# Patient Record
Sex: FEMALE | Race: BLACK OR AFRICAN AMERICAN | Employment: OTHER | URBAN - METROPOLITAN AREA
[De-identification: names, ages, dates, MRNs, and addresses within clinical notes are randomized per-mention and may not be internally consistent; named-entity substitution may affect disease eponyms.]

---

## 2017-01-14 DIAGNOSIS — Z00.00 ROUTINE GENERAL MEDICAL EXAMINATION AT A HEALTH CARE FACILITY: ICD-10-CM

## 2017-01-15 RX ORDER — PHENTERMINE AND TOPIRAMATE 11.25; 69 MG/1; MG/1
CAPSULE, EXTENDED RELEASE ORAL
Qty: 30 CAP | Refills: 2 | OUTPATIENT
Start: 2017-01-15 | End: 2017-08-15 | Stop reason: SDUPTHER

## 2017-01-25 ENCOUNTER — HOSPITAL ENCOUNTER (EMERGENCY)
Age: 57
Discharge: HOME | End: 2017-01-25
Attending: EMERGENCY MEDICINE
Payer: COMMERCIAL

## 2017-01-25 VITALS
HEIGHT: 64 IN | TEMPERATURE: 97.9 F | BODY MASS INDEX: 27.1 KG/M2 | WEIGHT: 158.73 LBS | SYSTOLIC BLOOD PRESSURE: 122 MMHG | HEART RATE: 83 BPM | DIASTOLIC BLOOD PRESSURE: 68 MMHG | OXYGEN SATURATION: 97 % | RESPIRATION RATE: 18 BRPM

## 2017-01-25 DIAGNOSIS — T78.40XA ALLERGIC REACTION, INITIAL ENCOUNTER: Primary | ICD-10-CM

## 2017-01-25 DIAGNOSIS — R22.0 SWELLING OF BOTH LIPS: ICD-10-CM

## 2017-01-25 PROCEDURE — 74011250636 HC RX REV CODE- 250/636: Performed by: EMERGENCY MEDICINE

## 2017-01-25 PROCEDURE — 99284 EMERGENCY DEPT VISIT MOD MDM: CPT

## 2017-01-25 PROCEDURE — 96361 HYDRATE IV INFUSION ADD-ON: CPT

## 2017-01-25 PROCEDURE — 96375 TX/PRO/DX INJ NEW DRUG ADDON: CPT

## 2017-01-25 PROCEDURE — 74011000250 HC RX REV CODE- 250: Performed by: EMERGENCY MEDICINE

## 2017-01-25 PROCEDURE — 96374 THER/PROPH/DIAG INJ IV PUSH: CPT

## 2017-01-25 RX ORDER — FAMOTIDINE 10 MG/ML
20 INJECTION INTRAVENOUS
Status: COMPLETED | OUTPATIENT
Start: 2017-01-25 | End: 2017-01-25

## 2017-01-25 RX ORDER — EPINEPHRINE 0.3 MG/.3ML
0.3 INJECTION SUBCUTANEOUS
Qty: 1 SYRINGE | Refills: 1 | Status: SHIPPED | OUTPATIENT
Start: 2017-01-25 | End: 2017-10-24 | Stop reason: ALTCHOICE

## 2017-01-25 RX ORDER — FAMOTIDINE 20 MG/1
20 TABLET, FILM COATED ORAL 2 TIMES DAILY
Qty: 20 TAB | Refills: 0 | Status: SHIPPED | OUTPATIENT
Start: 2017-01-25 | End: 2017-01-25

## 2017-01-25 RX ORDER — DIPHENHYDRAMINE HYDROCHLORIDE 50 MG/ML
25 INJECTION, SOLUTION INTRAMUSCULAR; INTRAVENOUS
Status: COMPLETED | OUTPATIENT
Start: 2017-01-25 | End: 2017-01-25

## 2017-01-25 RX ORDER — PREDNISONE 10 MG/1
TABLET ORAL
Qty: 21 TAB | Refills: 0 | Status: SHIPPED | OUTPATIENT
Start: 2017-01-25 | End: 2017-01-25

## 2017-01-25 RX ORDER — DIPHENHYDRAMINE HCL 25 MG
25 CAPSULE ORAL
Qty: 20 CAP | Refills: 0 | Status: SHIPPED | OUTPATIENT
Start: 2017-01-25 | End: 2017-01-25

## 2017-01-25 RX ORDER — DIPHENHYDRAMINE HCL 25 MG
25 CAPSULE ORAL
Qty: 20 CAP | Refills: 0 | Status: SHIPPED | OUTPATIENT
Start: 2017-01-25 | End: 2017-02-04

## 2017-01-25 RX ORDER — PREDNISONE 10 MG/1
TABLET ORAL
Qty: 21 TAB | Refills: 0 | Status: SHIPPED | OUTPATIENT
Start: 2017-01-25 | End: 2017-03-01 | Stop reason: ALTCHOICE

## 2017-01-25 RX ORDER — EPINEPHRINE 0.3 MG/.3ML
0.3 INJECTION SUBCUTANEOUS
Qty: 1 SYRINGE | Refills: 1 | Status: SHIPPED | OUTPATIENT
Start: 2017-01-25 | End: 2017-01-25

## 2017-01-25 RX ORDER — FAMOTIDINE 20 MG/1
20 TABLET, FILM COATED ORAL 2 TIMES DAILY
Qty: 20 TAB | Refills: 0 | Status: SHIPPED | OUTPATIENT
Start: 2017-01-25 | End: 2017-02-04

## 2017-01-25 RX ADMIN — FAMOTIDINE 20 MG: 10 INJECTION, SOLUTION INTRAVENOUS at 06:10

## 2017-01-25 RX ADMIN — SODIUM CHLORIDE 1000 ML: 900 INJECTION, SOLUTION INTRAVENOUS at 06:10

## 2017-01-25 RX ADMIN — DIPHENHYDRAMINE HYDROCHLORIDE 25 MG: 50 INJECTION, SOLUTION INTRAMUSCULAR; INTRAVENOUS at 06:10

## 2017-01-25 RX ADMIN — METHYLPREDNISOLONE SODIUM SUCCINATE 125 MG: 125 INJECTION, POWDER, FOR SOLUTION INTRAMUSCULAR; INTRAVENOUS at 06:10

## 2017-01-25 NOTE — ED TRIAGE NOTES
Assumed care of patient; patient resting comfortably on stretcher; stretcher in lowest position and locked; patient in no apparent distress at this time; call bell within reach. Patient ambulatory to room with noticeable swelling to lips; states no other symptoms involved; patient rates pain in lip is 5/10.

## 2017-01-25 NOTE — ED PROVIDER NOTES
HPI Comments: Funmilayo Oreilly is a 49 y.o. female with PMHx significant for arthritis, who presents ambulatory to [facility] ED with cc of lip swelling, which she noticed this morning when she woke up. Pt states that she woke up this morning with swelling in her top lip, and associated 5/10 discomfort in the lip due to tightness of the skin. She denies taking any medications prior to arrival to the ED, and since arrival she also feels like her bottom lip has become more swollen. Pt reports that she does not take prescription medications, and reports that she has not any changes in make-up or toothpaste. The only thing that she can think of that was different, was that she had some \"home made soup\" last night for dinner, which her boyfriend brought home from a co-worker. Of note, pt reports that she had a similar reaction in the past to PCN, but she denies using any PCN products. Pt also denies any known dietary food allergies, and she specifically denies any itching, tongue swelling, SOB, difficulty swallowing, drooling, CP, abdominal pain, nausea, vomiting, and rashes. PCP: Paula Olivera MD    PMHx is significant for: arthritis   PSHx is significant for: B/L knee replacement surgery, right shoulder surgery, hysterectomy, colonoscopy   Social History: (-) Tobacco, (+) EtOH (occasionally), (-) Illicit Drugs      There are no other complaints, changes, or physical findings at this time. Written by RONNELL Gallardoibjanuary, as dictated by Hermila Schroeder MD.      The history is provided by the patient.         Past Medical History:   Diagnosis Date    Arthritis        Past Surgical History:   Procedure Laterality Date    Hx orthopaedic  12/2009     bilateral knee replacement    Hx mohs procedure  2003     right shoulder    Hx hysterectomy  2003    Hx colonoscopy  1/15/15     Dr. Nohemi Villegas - 8 yr f/u         Family History:   Problem Relation Age of Onset    Family history unknown: Yes       Social History     Social History    Marital status:      Spouse name: N/A    Number of children: N/A    Years of education: N/A     Occupational History    Not on file. Social History Main Topics    Smoking status: Never Smoker    Smokeless tobacco: Never Used    Alcohol use 1.8 - 2.4 oz/week     3 - 4 Cans of beer per week      Comment: occassionally    Drug use: No    Sexual activity: Yes     Partners: Male     Birth control/ protection: Surgical     Other Topics Concern    Not on file     Social History Narrative         ALLERGIES: Demerol [meperidine] and Penicillin g    Review of Systems   Constitutional: Negative. HENT: Positive for facial swelling (Upper>lower lip). Negative for drooling, trouble swallowing and voice change. Negative for itching of lip  Negative for tongue swelling   Eyes: Negative. Respiratory: Negative. Negative for shortness of breath. Cardiovascular: Negative. Negative for chest pain. Gastrointestinal: Negative. Negative for abdominal pain, nausea and vomiting. Genitourinary: Negative. Musculoskeletal: Negative. Skin: Negative. Negative for rash. Neurological: Negative. Hematological: Negative. Psychiatric/Behavioral: Negative. All other systems reviewed and are negative.       Patient Vitals for the past 12 hrs:   Temp Pulse Resp BP SpO2   01/25/17 0730 - - - 122/68 97 %   01/25/17 0715 - - - 123/69 97 %   01/25/17 0700 - - - 133/71 98 %   01/25/17 0645 - - - 127/62 98 %   01/25/17 0630 - - - 127/66 100 %   01/25/17 0615 - - - 115/59 100 %   01/25/17 0600 - - - 116/73 100 %   01/25/17 0548 - - - 113/66 -   01/25/17 0546 97.9 °F (36.6 °C) 83 18 113/66 100 %          Physical Exam     General appearance - well nourished, well appearing, and in no distress  Eyes - pupils equal and reactive, extraocular eye movements intact  ENT - mucous membranes moist, pharynx normal without lesions, swelling to upper and lower lips (upper > lower), no tongue swelling, normal voice, handling secretions  Neck - supple, no significant adenopathy; non-tender to palpation  Chest - clear to auscultation, no wheezes, rales or rhonchi; non-tender to palpation  Heart - normal rate and regular rhythm, S1 and S2 normal, no murmurs noted  Abdomen - soft, nontender, nondistended, no masses or organomegaly  Musculoskeletal - no joint tenderness, deformity or swelling; normal ROM  Extremities - peripheral pulses normal, no pedal edema  Skin - normal coloration and turgor, no rashes  Neurological - alert, oriented x3, normal speech, no focal findings or movement disorder noted         MDM  Number of Diagnoses or Management Options  Diagnosis management comments: DDx: angioedema, allergic reaction, cellulitis       Amount and/or Complexity of Data Reviewed  Review and summarize past medical records: yes    Patient Progress  Patient progress: stable    ED Course       Procedures    Progress Note:  7:34 AM  Pt re-evaluated. She reports that her swelling has improved, and notes that she is feeling better. Will plan for discharge. Noticed that \"QSYMIA\" is listed as part of patient's medications. However, pt states that she has not yet started taking that medication, and the only thing she is taking is detox tea. Written by Dhaval Wilkinson ED Scribe, as dictated by Janet Og MD.      MEDICATIONS GIVEN:  Medications   sodium chloride 0.9 % bolus infusion 1,000 mL (0 mL IntraVENous IV Completed 1/25/17 0738)   methylPREDNISolone (PF) (SOLU-MEDROL) injection 125 mg (125 mg IntraVENous Given 1/25/17 0610)   diphenhydrAMINE (BENADRYL) injection 25 mg (25 mg IntraVENous Given 1/25/17 0610)   famotidine (PF) (PEPCID) injection 20 mg (20 mg IntraVENous Given 1/25/17 0610)       IMPRESSION:  1. Allergic reaction, initial encounter    2. Swelling of both lips        PLAN:  1.    Current Discharge Medication List      START taking these medications    Details   predniSONE (STERAPRED DS) 10 mg dose pack As directed  Qty: 21 Tab, Refills: 0      famotidine (PEPCID) 20 mg tablet Take 1 Tab by mouth two (2) times a day for 10 days. Qty: 20 Tab, Refills: 0      diphenhydrAMINE (BENADRYL) 25 mg capsule Take 1 Cap by mouth every six (6) hours as needed for up to 10 days. Qty: 20 Cap, Refills: 0      EPINEPHrine (EPIPEN) 0.3 mg/0.3 mL injection 0.3 mL by IntraMUSCular route once as needed for up to 1 dose. Qty: 1 Syringe, Refills: 1         CONTINUE these medications which have NOT CHANGED    Details   QSYMIA 11.25-69 mg CM24 TAKE 1 CAPSULE BY MOUTH EVERY MORNING  Qty: 30 Cap, Refills: 2    Associated Diagnoses: Routine general medical examination at a health care facility      RX AMB NIFEDIPINE 0.2 % RECTAL OINTMENT COMPOUND Nifedipine 0.2% Rectal Ointment (zinc 25mg/L-Carnitine 10mg)    Apply to anus three times daily x 7 days  Qty: 60 g, Refills: 1    Associated Diagnoses: Thrombosed external hemorrhoid      ibuprofen (MOTRIN) 800 mg tablet every six (6) hours as needed. 2.   Follow-up Information     Follow up With Details [id number] Lulu Abraham III, MD In 2 days  [address] [facility]  [phone]      [facility] EMERGENCY DEPT  If symptoms worsen [address] [place] [address]  [phone]        3. Return to ED if worse     Discharge Note:  7:39 AM   The patient has been re-evaluated and is ready for discharge. Reviewed available results with patient. Counseled patient on diagnosis and care plan. Patient has expressed understanding, and all questions have been answered. Patient agrees with plan and agrees to follow up as recommended, or to return to the ED if their symptoms worsen. Discharge instructions have been provided and explained to the patient, along with reasons to return to the ED. This note is prepared by Garo Santa, acting as Scribe for Janet Patino MD.     Hermila Schroeder MD: The ana's documentation has been prepared under my direction and personally reviewed by me in its entirety. I confirm that the note above accurately reflects all work, treatment, procedures, and medical decision making performed by me.

## 2017-01-25 NOTE — ED NOTES
Change of shift report given to oncoming nurse; from off-going nurse; report included SBAR, MAR and ED summary.

## 2017-01-25 NOTE — ED NOTES
The patient was given discharge instructions by MD, as well as printed prescriptions, and questions were answered. Patient is in no apparent distress at time of discharge. Ambulatory with steady gait. Friend picking her up from the waiting room.

## 2017-01-25 NOTE — ED NOTES
Hourly rounding complete; patient resting comfortably on stretcher in lowest position and locked and in NAD; patient A&Ox4; monitor x 2; call bell within reach.

## 2017-01-25 NOTE — DISCHARGE INSTRUCTIONS
Allergic Reaction: Care Instructions  Your Care Instructions  An allergic reaction is an excessive response from your immune system to a medicine, chemical, food, insect bite, or other substance. A reaction can range from mild to life-threatening. Some people have a mild rash, hives, and itching or stomach cramps. In severe reactions, swelling of your tongue and throat can close up your airway so that you cannot breathe. Follow-up care is a key part of your treatment and safety. Be sure to make and go to all appointments, and call your doctor if you are having problems. It's also a good idea to know your test results and keep a list of the medicines you take. How can you care for yourself at home? · If you know what caused your allergic reaction, be sure to avoid it. Your allergy may become more severe each time you have a reaction. · Take an over-the-counter antihistamine, such as cetirizine (Zyrtec) or loratadine (Claritin), to treat mild symptoms. Read and follow directions on the label. Some antihistamines can make you feel sleepy. Do not give antihistamines to a child unless you have checked with your doctor first. Mild symptoms include sneezing or an itchy or runny nose; an itchy mouth; a few hives or mild itching; and mild nausea or stomach discomfort. · Do not scratch hives or a rash. Put a cold, moist towel on them or take cool baths to relieve itching. Put ice packs on hives, swelling, or insect stings for 10 to 15 minutes at a time. Put a thin cloth between the ice pack and your skin. Do not take hot baths or showers. They will make the itching worse. · Your doctor may prescribe a shot of epinephrine to carry with you in case you have a severe reaction. Learn how to give yourself the shot and keep it with you at all times. Make sure it is not . · Go to the emergency room every time you have a severe reaction, even if you have used your shot of epinephrine and are feeling better. Symptoms can come back after a shot. · Wear medical alert jewelry that lists your allergies. You can buy this at most drugstores. · If your child has a severe allergy, make sure that his or her teachers, babysitters, coaches, and other caregivers know about the allergy. They should have an epinephrine shot, know how and when to give it, and have a plan to take your child to the hospital.  When should you call for help? Give an epinephrine shot if:  · You think you are having a severe allergic reaction. · You have symptoms in more than one body area, such as mild nausea and an itchy mouth. After giving an epinephrine shot call 911, even if you feel better. Call 911 if:  · You have symptoms of a severe allergic reaction. These may include:  ¨ Sudden raised, red areas (hives) all over your body. ¨ Swelling of the throat, mouth, lips, or tongue. ¨ Trouble breathing. ¨ Passing out (losing consciousness). Or you may feel very lightheaded or suddenly feel weak, confused, or restless. · You have been given an epinephrine shot, even if you feel better. Call your doctor now or seek immediate medical care if:  · You have symptoms of an allergic reaction, such as:  ¨ A rash or hives (raised, red areas on the skin). ¨ Itching. ¨ Swelling. ¨ Belly pain, nausea, or vomiting. Watch closely for changes in your health, and be sure to contact your doctor if:  · You do not get better as expected. Where can you learn more? Go to http://ginger-leon.info/. Enter F569 in the search box to learn more about \"Allergic Reaction: Care Instructions. \"  Current as of: February 12, 2016  Content Version: 11.1  © 7765-3300 [employer]. Care instructions adapted under license by [employer] (which disclaims liability or warranty for this information).  If you have questions about a medical condition or this instruction, always ask your healthcare professional. Marion Jacinto disclaims any warranty or liability for your use of this information.

## 2017-01-25 NOTE — LETTER
[facility] EM[facility] 
[address] [place] [address] [id number] [phone] Work/School Note Date: 1/25/2017 To Whom It May concern: 
 
Braxton Sandy was seen and treated today in the emergency room by the following provider(s): 
Attending Provider: Jody Hernandez MD.   
 
Braxton Sandy should be excused from work on 1/25/2017. Sincerely, Jody Hernandez MD

## 2017-03-01 ENCOUNTER — OFFICE VISIT (OUTPATIENT)
Dept: INTERNAL MEDICINE CLINIC | Age: 57
End: 2017-03-01

## 2017-03-01 VITALS
HEART RATE: 80 BPM | SYSTOLIC BLOOD PRESSURE: 110 MMHG | TEMPERATURE: 97.7 F | OXYGEN SATURATION: 97 % | DIASTOLIC BLOOD PRESSURE: 80 MMHG | RESPIRATION RATE: 19 BRPM | BODY MASS INDEX: 25.54 KG/M2 | WEIGHT: 149.6 LBS | HEIGHT: 64 IN

## 2017-03-01 DIAGNOSIS — R10.13 EPIGASTRIC PAIN: Primary | ICD-10-CM

## 2017-03-01 RX ORDER — OMEPRAZOLE 20 MG/1
20 CAPSULE, DELAYED RELEASE ORAL DAILY
Qty: 30 CAP | Refills: 1 | Status: SHIPPED | OUTPATIENT
Start: 2017-03-01 | End: 2017-10-24 | Stop reason: ALTCHOICE

## 2017-03-01 NOTE — PROGRESS NOTES
Acute Care Note    FRANCISCO NIELSEN is 49 y.o. female. she presents for evaluation of Epigastric Pain    Abdominal Pain  Arlette Hameed is here to talk about abdominal pain. This is a new problem and symptoms began 1 months ago and have not changed. Pain is located in epigastric and described as a burning. She symptoms are aggravated by none. Symptoms improve with none. She has tried no specific therapy for this. Past history includes no specific GI history. Previous studies include colonoscopy which showed only hemorrhoids. Of note, she has recently resumed taking Qsymia for weight loss (she takes this off and on). She reports that her abdominal pain began shortly after starting the medication. Prior to Admission medications    Medication Sig Start Date End Date Taking? Authorizing Provider   QSYMIA 11.25-69 mg CM24 TAKE 1 CAPSULE BY MOUTH EVERY MORNING 1/15/17  Yes Kerri Chen III, MD   ibuprofen (MOTRIN) 800 mg tablet every six (6) hours as needed. 12/16/14  Yes Historical Provider   EPINEPHrine (EPIPEN) 0.3 mg/0.3 mL injection 0.3 mL by IntraMUSCular route once as needed for up to 1 dose. 1/25/17 April N MD Gale   RX AMB NIFEDIPINE 0.2 % RECTAL OINTMENT COMPOUND Nifedipine 0.2% Rectal Ointment (zinc 25mg/L-Carnitine 10mg)    Apply to anus three times daily x 7 days  Patient taking differently: as needed. Nifedipine 0.2% Rectal Ointment (zinc 25mg/L-Carnitine 10mg)    Apply to anus three times daily x 7 days 9/15/15   Raquel Dixon MD         Patient Active Problem List   Diagnosis Code    Advance directive declined by patient Z78.9         Review of Systems   Constitutional: Negative. Cardiovascular: Negative. Gastrointestinal: Positive for abdominal pain and heartburn.          Visit Vitals    /80 (BP 1 Location: Right arm, BP Patient Position: Sitting)    Pulse 80    Temp 97.7 °F (36.5 °C) (Oral)    Resp 19    Ht 5' 4\" (1.626 m)    Wt 149 lb 9.6 oz (67.9 kg)    SpO2 97%    BMI 25.68 kg/m2       Physical Exam   Constitutional: She appears well-developed and well-nourished. Cardiovascular: Normal rate and regular rhythm. Pulmonary/Chest: Effort normal and breath sounds normal. She has no wheezes. Abdominal: Soft. Bowel sounds are normal. There is tenderness (mild epigastric tenderness). ASSESSMENT/PLAN  Ene Baez was seen today for epigastric pain. Diagnoses and all orders for this visit:    Epigastric pain- Her symptoms seem to be precipitated by the medication. She will hold this for now and treat her dyspepsia symptoms with prilosec. -     omeprazole (PRILOSEC) 20 mg capsule; Take 1 Cap by mouth daily. Advised the patient to call back or return to office if symptoms worsen/change/persist.   Discussed expected course/resolution/complications of diagnosis in detail with patient. Medication risks/benefits/costs/interactions/alternatives discussed with patient. The patient was given an after visit summary which includes diagnoses, current medications, & vitals. They expressed understanding with the diagnosis and plan.

## 2017-03-01 NOTE — MR AVS SNAPSHOT
Visit Information Date & Time Provider Department Dept. Phone Encounter #  
 3/1/2017  3:30 PM [facility] [phone] [id number] Upcoming Health Maintenance Date Due INFLUENZA AGE 9 TO ADULT 8/1/2016 BREAST CANCER SCRN MAMMOGRAM 1/6/2018 PAP AKA CERVICAL CYTOLOGY 2/1/2018 COLONOSCOPY 4/1/2025 DTaP/Tdap/Td series (2 - Td) 7/31/2025 Allergies as of 3/1/2017  Review Complete On: 3/1/2017 By: Mahin Chacko MD  
  
 Severity Noted Reaction Type Reactions Demerol [Meperidine]  05/28/2010    Itching Penicillin G  05/28/2010    Anaphylaxis Current Immunizations  Reviewed on 6/10/2016 Name Date Influenza Vaccine 10/19/2015 Tdap 7/31/2015 Not reviewed this visit You Were Diagnosed With   
  
 Codes Comments Epigastric pain    -  Primary ICD-10-CM: R10.13 ICD-9-CM: 789.06 Vitals BP  
  
  
  
  
  
 110/80 (BP 1 Location: Right arm, BP Patient Position: Sitting) BMI and BSA Data Body Mass Index Body Surface Area  
 25.68 kg/m 2 1.75 m 2 Preferred Pharmacy Pharmacy Name Phone [facility] [address] [phone] Your Updated Medication List  
  
   
This list is accurate as of: 3/1/17  4:07 PM.  Always use your most recent med list.  
  
  
  
  
 EPINEPHrine 0.3 mg/0.3 mL injection Commonly known as:  EPIPEN  
0.3 mL by IntraMUSCular route once as needed for up to 1 dose. ibuprofen 800 mg tablet Commonly known as:  MOTRIN  
every six (6) hours as needed. omeprazole 20 mg capsule Commonly known as:  PRILOSEC Take 1 Cap by mouth daily. QSYMIA 11.25-69 mg Cm24 Generic drug:  phentermine-topiramate TAKE 1 CAPSULE BY MOUTH EVERY MORNING  
  
 RX AMB NIFEDIPINE 0.2 % RECTAL OINTMENT COMPOUND Nifedipine 0.2% Rectal Ointment (zinc 25mg/L-Carnitine 10mg)  Apply to anus three times daily x 7 days Prescriptions Sent to Pharmacy Refills  
 omeprazole (PRILOSEC) 20 mg capsule 1 Sig: Take 1 Cap by mouth daily. Class: Normal  
 Pharmacy: [facility] [address] [place] [address] #: [phone] Route: Oral  
  
Patient Instructions Indigestion (Dyspepsia or Heartburn): Care Instructions Your Care Instructions Sometimes it can be hard to pinpoint the cause of indigestion (dyspepsia or heartburn). Most cases of an upset stomach with bloating, burning, burping, and nausea are minor and go away within several hours. Home treatment and over-the-counter medicine often are able to control symptoms. But if you take medicine to relieve your indigestion without making diet and lifestyle changes, your symptoms are likely to return again and again. If you get indigestion often, it may be a sign of a more serious medical problem. Be sure to follow up with your doctor, who may want to do tests to be sure of the cause of your indigestion. Follow-up care is a key part of your treatment and safety. Be sure to make and go to all appointments, and call your doctor if you are having problems. Its also a good idea to know your test results and keep a list of the medicines you take. How can you care for yourself at home? · Your doctor may recommend over-the-counter medicine. For mild or occasional indigestion, antacids such as Tums, Gaviscon, Mylanta, or Maalox may help. Your doctor also may recommend over-the-counter acid reducers, such as Pepcid AC, Tagamet HB, Zantac 75, or Prilosec. Read and follow all instructions on the label. If you use these medicines often, talk with your doctor. · Change your eating habits. ¨ Its best to eat several small meals instead of two or three large meals. ¨ After you eat, wait 2 to 3 hours before you lie down. ¨ Chocolate, mint, and alcohol can make GERD worse. ¨ Spicy foods, foods that have a lot of acid (like tomatoes and oranges), and coffee can make GERD symptoms worse in some people. If your symptoms are worse after you eat a certain food, you may want to stop eating that food to see if your symptoms get better. · Do not smoke or chew tobacco. Smoking can make GERD worse. If you need help quitting, talk to your doctor about stop-smoking programs and medicines. These can increase your chances of quitting for good. · If you have GERD symptoms at night, raise the head of your bed 6 to 8 inches by putting the frame on blocks or placing a foam wedge under the head of your mattress. (Adding extra pillows does not work.) · Do not wear tight clothing around your middle. · Lose weight if you need to. Losing just 5 to 10 pounds can help. · Do not take anti-inflammatory medicines, such as aspirin, ibuprofen (Advil, Motrin), or naproxen (Aleve). These can irritate the stomach. If you need a pain medicine, try acetaminophen (Tylenol), which does not cause stomach upset. When should you call for help? Call 911 anytime you think you may need emergency care. For example, call if: 
· You passed out (lost consciousness). · You vomit blood or what looks like coffee grounds. · You pass maroon or very bloody stools. · You have chest pain or pressure. This may occur with: ¨ Sweating. ¨ Shortness of breath. ¨ Nausea or vomiting. ¨ Pain that spreads from the chest to the neck, jaw, or one or both shoulders or arms. ¨ Feeling dizzy or lightheaded. ¨ A fast or uneven pulse. After calling 911, chew 1 adult-strength aspirin. Wait for an ambulance. Do not try to drive yourself. Call your doctor now or seek immediate medical care if: 
· You have severe belly pain. · Your stools are black and tarlike or have streaks of blood. · You have trouble swallowing. · You are losing weight and do not know why. Watch closely for changes in your health, and be sure to contact your doctor if: 
· You do not get better as expected. Where can you learn more? Go to http://ginger-leon.info/. Enter K293 in the search box to learn more about \"Indigestion (Dyspepsia or Heartburn): Care Instructions. \" Current as of: August 9, 2016 Content Version: 11.1 © 6716-8496 Client24. Care instructions adapted under license by [employer] (which disclaims liability or warranty for this information). If you have questions about a medical condition or this instruction, always ask your healthcare professional. [address] any warranty or liability for your use of this information. Introducing [facility]! [facility] introduces [employer]legant Service patient portal. Now you can access parts of your medical record, email your doctor's office, and request medication refills online. 1. In your internet browser, go to [email or web address] 2. Click on the First Time User? Click Here link in the Sign In box. You will see the New Member Sign Up page. 3. Enter your E[employer] Access Code exactly as it appears below. You will not need to use this code after youve completed the sign-up process. If you do not sign up before the expiration date, you must request a new code. · E[employer]ant [employer] Access Code: [email or web address] Expires: 4/25/2017  5:34 AM 
 
4. Enter the last four digits of your Social Security Number (xxxx) and Date of Birth (mm/dd/yyyy) as indicated and click Submit. You will be taken to the next sign-up page. 5. Create a Elegant Service ID. This will be your Elegant Service login ID and cannot be changed, so think of one that is secure and easy to remember. 6. Create a E[employer] Service password. You can change your password at any time. 7. Enter your Password Reset Question and Answer.  This can be used at a later time if you forget your password. 8. Enter your e-mail address. You will receive e-mail notification when new information is available in [address] 9. Click Sign Up. You can now view and download portions of your medical record. 10. Click the Download Summary menu link to download a portable copy of your medical information. If you have questions, please visit the Frequently Asked Questions section of the [employer] website. Remember, [employer] is NOT to be used for urgent needs. For medical emergencies, dial 911. Now available from your iPhone and Android! Please provide this summary of care documentation to your next provider. Your primary care clinician is listed as Yun [phone] If you have any questions after today's visit, please call [phone].

## 2017-03-01 NOTE — PATIENT INSTRUCTIONS
Indigestion (Dyspepsia or Heartburn): Care Instructions  Your Care Instructions  Sometimes it can be hard to pinpoint the cause of indigestion (dyspepsia or heartburn). Most cases of an upset stomach with bloating, burning, burping, and nausea are minor and go away within several hours. Home treatment and over-the-counter medicine often are able to control symptoms. But if you take medicine to relieve your indigestion without making diet and lifestyle changes, your symptoms are likely to return again and again. If you get indigestion often, it may be a sign of a more serious medical problem. Be sure to follow up with your doctor, who may want to do tests to be sure of the cause of your indigestion. Follow-up care is a key part of your treatment and safety. Be sure to make and go to all appointments, and call your doctor if you are having problems. Its also a good idea to know your test results and keep a list of the medicines you take. How can you care for yourself at home? · Your doctor may recommend over-the-counter medicine. For mild or occasional indigestion, antacids such as Tums, Gaviscon, Mylanta, or Maalox may help. Your doctor also may recommend over-the-counter acid reducers, such as Pepcid AC, Tagamet HB, Zantac 75, or Prilosec. Read and follow all instructions on the label. If you use these medicines often, talk with your doctor. · Change your eating habits. ¨ Its best to eat several small meals instead of two or three large meals. ¨ After you eat, wait 2 to 3 hours before you lie down. ¨ Chocolate, mint, and alcohol can make GERD worse. ¨ Spicy foods, foods that have a lot of acid (like tomatoes and oranges), and coffee can make GERD symptoms worse in some people. If your symptoms are worse after you eat a certain food, you may want to stop eating that food to see if your symptoms get better. · Do not smoke or chew tobacco. Smoking can make GERD worse.  If you need help quitting, talk to your doctor about stop-smoking programs and medicines. These can increase your chances of quitting for good. · If you have GERD symptoms at night, raise the head of your bed 6 to 8 inches by putting the frame on blocks or placing a foam wedge under the head of your mattress. (Adding extra pillows does not work.)  · Do not wear tight clothing around your middle. · Lose weight if you need to. Losing just 5 to 10 pounds can help. · Do not take anti-inflammatory medicines, such as aspirin, ibuprofen (Advil, Motrin), or naproxen (Aleve). These can irritate the stomach. If you need a pain medicine, try acetaminophen (Tylenol), which does not cause stomach upset. When should you call for help? Call 911 anytime you think you may need emergency care. For example, call if:  · You passed out (lost consciousness). · You vomit blood or what looks like coffee grounds. · You pass maroon or very bloody stools. · You have chest pain or pressure. This may occur with:  ¨ Sweating. ¨ Shortness of breath. ¨ Nausea or vomiting. ¨ Pain that spreads from the chest to the neck, jaw, or one or both shoulders or arms. ¨ Feeling dizzy or lightheaded. ¨ A fast or uneven pulse. After calling 911, chew 1 adult-strength aspirin. Wait for an ambulance. Do not try to drive yourself. Call your doctor now or seek immediate medical care if:  · You have severe belly pain. · Your stools are black and tarlike or have streaks of blood. · You have trouble swallowing. · You are losing weight and do not know why. Watch closely for changes in your health, and be sure to contact your doctor if:  · You do not get better as expected. Where can you learn more? Go to http://ginger-leon.info/. Enter L190 in the search box to learn more about \"Indigestion (Dyspepsia or Heartburn): Care Instructions. \"  Current as of: August 9, 2016  Content Version: 11.1  © 4453-9692 [employer], [employer].  Care instructions adapted under license by [address] (which disclaims liability or warranty for this information). If you have questions about a medical condition or this instruction, always ask your healthcare professional. [address] any warranty or liability for your use of this information.

## 2017-08-15 DIAGNOSIS — Z00.00 ROUTINE GENERAL MEDICAL EXAMINATION AT A HEALTH CARE FACILITY: ICD-10-CM

## 2017-08-15 RX ORDER — PHENTERMINE AND TOPIRAMATE 11.25; 69 MG/1; MG/1
CAPSULE, EXTENDED RELEASE ORAL
Qty: 30 CAP | Refills: 2 | OUTPATIENT
Start: 2017-08-15 | End: 2018-07-06 | Stop reason: DRUGHIGH

## 2017-08-16 NOTE — TELEPHONE ENCOUNTER
Orders Placed This Encounter    QSYMIA 11.25-69 mg CM24     Sig: TAKE ONE CAPSULE BY MOUTH EVERY MORNING     Dispense:  30 Cap     Refill:  2     The above controlled substance refill was called into patients pharmacy - [facility] - authorized by Dr. Xochitl Ramirez.

## 2017-10-24 ENCOUNTER — OFFICE VISIT (OUTPATIENT)
Dept: INTERNAL MEDICINE CLINIC | Age: 57
End: 2017-10-24

## 2017-10-24 VITALS
OXYGEN SATURATION: 99 % | BODY MASS INDEX: 27.83 KG/M2 | WEIGHT: 163 LBS | HEART RATE: 68 BPM | TEMPERATURE: 97.8 F | SYSTOLIC BLOOD PRESSURE: 126 MMHG | DIASTOLIC BLOOD PRESSURE: 84 MMHG | HEIGHT: 64 IN | RESPIRATION RATE: 14 BRPM

## 2017-10-24 DIAGNOSIS — S39.012D LUMBAR STRAIN, SUBSEQUENT ENCOUNTER: Primary | ICD-10-CM

## 2017-10-24 DIAGNOSIS — Z23 ENCOUNTER FOR IMMUNIZATION: ICD-10-CM

## 2017-10-24 RX ORDER — TIZANIDINE 4 MG/1
2-4 TABLET ORAL
Qty: 30 TAB | Refills: 1 | Status: SHIPPED | OUTPATIENT
Start: 2017-10-24 | End: 2018-02-01 | Stop reason: SDUPTHER

## 2017-10-24 RX ORDER — IBUPROFEN 800 MG/1
800 TABLET ORAL
Qty: 30 TAB | Refills: 2 | Status: SHIPPED | OUTPATIENT
Start: 2017-10-24 | End: 2018-02-01 | Stop reason: SDUPTHER

## 2017-10-24 NOTE — PROGRESS NOTES
HPI:  Mirna Thomas is a 49y.o. year old female who is here for pain in the right buttocks for several months now. She was seen at urgent care over a month ago and given a Medrol Dosepak. The pain is along her right belt line and goes up below her shoulder blades. The pain increases with movement or twisting. Some buttocks pain. Some pain with sitting at times. No radiation of the pain to the foot. No numbness or tingling or weakness. No change in bowel or bladder habits. She is unaware of any injury or precipitant. She does get some relief with ibuprofen. She got little relief with steroids. Past Medical History:   Diagnosis Date    Arthritis     Other specified dorsopathies, sacral and sacrococcygeal region (CODE)        Past Surgical History:   Procedure Laterality Date    HX COLONOSCOPY  1/15/15    Dr. HENNING [facility] - 8 yr f/u    HX HYSTERECTOMY  2003    HX [address] PROCEDURES  2003    right shoulder    HX ORTHOPAEDIC  12/2009    bilateral knee replacement       Prior to Admission medications    Medication Sig Start Date End Date Taking? Authorizing Provider   ibuprofen (MOTRIN) 800 mg tablet Take 1 Tab by mouth every eight (8) hours as needed. 10/24/17  Yes Avinash Razo III, MD   tiZANidine (ZANAFLEX) 4 mg tablet Take 1 Tab by mouth three (3) times daily as needed. 10/24/17  Yes Jennifer Vela MD   QSYMIA 11.25-69 mg CM24 TAKE ONE CAPSULE BY MOUTH EVERY MORNING 8/15/17  Yes Jennifer Vela MD   RX AMB NIFEDIPINE 0.2 % RECTAL OINTMENT COMPOUND Nifedipine 0.2% Rectal Ointment (zinc 25mg/L-Carnitine 10mg)    Apply to anus three times daily x 7 days  Patient taking differently: as needed.  Nifedipine 0.2% Rectal Ointment (zinc 25mg/L-Carnitine 10mg)    Apply to anus three times daily x 7 days 9/15/15  Yes Deysi Olivia MD       Social History     Social History    Marital status:      Spouse name: N/A    Number of children: N/A    Years of education: N/A     Occupational History    Not on file. Social History Main Topics    Smoking status: Never Smoker    Smokeless tobacco: Never Used    Alcohol use 1.8 - 2.4 oz/week     3 - 4 Cans of beer per week      Comment: occassionally    Drug use: No    Sexual activity: Yes     Partners: Male     Birth control/ protection: Surgical     Other Topics Concern    Not on file     Social History Narrative          ROS  Per HPI    Visit Vitals    /84    Pulse 68    Temp 97.8 °F (36.6 °C) (Oral)    Resp 14    Ht 5' 4\" (1.626 m)    Wt 163 lb (73.9 kg)    SpO2 99%    BMI 27.98 kg/m2         Physical Exam   Physical Examination: General appearance - alert, well appearing, and in no distress  Chest - clear to auscultation, no wheezes, rales or rhonchi, symmetric air entry  Heart - normal rate, regular rhythm, normal S1, S2, no murmurs, rubs, clicks or gallops  Abdomen - soft, nontender, nondistended, no masses or organomegaly  Back exam - tenderness noted right lower lumbar muscles, sacroiliac joints and sciatic notches nontender, negative straight-leg raise bilaterally at 45 degrees, normal reflexes and strength bilateral lower extremities  Neurological - alert, oriented, normal speech, no focal findings or movement disorder noted  Extremities - peripheral pulses normal, no pedal edema, no clubbing or cyanosis      Assessment/Plan:  Diagnoses and all orders for this visit:    1. Lumbar strain, subsequent encounter? Some degree of sciatica. Will treat with anti-inflammatories, muscle relaxers, and stretching and will do physical therapy if her symptoms persist.  -     ibuprofen (MOTRIN) 800 mg tablet; Take 1 Tab by mouth every eight (8) hours as needed. -     tiZANidine (ZANAFLEX) 4 mg tablet; Take 1 Tab by mouth three (3) times daily as needed.   -     REFERRAL TO PHYSICAL THERAPY    2. Encounter for immunization  -     [place] IMMUNIZ ADMIN,1 SINGLE/COMB VAC/TOXOID  -     INFLUENZA VIRUS VACCINE QUADRIVALENT, PRESERVATIVE FREE SYRINGE (95050)       Follow-up Disposition: as needed       Advised her to call back or return to office if symptoms worsen/change/persist.  Discussed expected course/resolution/complications of diagnosis in detail with patient. Medication risks/benefits/costs/interactions/alternatives discussed with patient. She was given an after visit summary which includes diagnoses, current medications, & vitals. She expressed understanding with the diagnosis and plan.

## 2017-10-24 NOTE — PATIENT INSTRUCTIONS

## 2017-11-27 ENCOUNTER — TELEPHONE (OUTPATIENT)
Dept: INTERNAL MEDICINE CLINIC | Age: 57
End: 2017-11-27

## 2017-11-27 NOTE — TELEPHONE ENCOUNTER
[phone] pt is schedule to see someone today at 12:15 for rt leg pain and she has lost the order, wants to come by and  another one. Also does not now where she is suppose to be going.

## 2018-01-10 DIAGNOSIS — Z00.00 ROUTINE GENERAL MEDICAL EXAMINATION AT A HEALTH CARE FACILITY: ICD-10-CM

## 2018-02-01 ENCOUNTER — OFFICE VISIT (OUTPATIENT)
Dept: INTERNAL MEDICINE CLINIC | Age: 58
End: 2018-02-01

## 2018-02-01 VITALS
HEART RATE: 60 BPM | SYSTOLIC BLOOD PRESSURE: 122 MMHG | WEIGHT: 159 LBS | DIASTOLIC BLOOD PRESSURE: 76 MMHG | BODY MASS INDEX: 27.14 KG/M2 | OXYGEN SATURATION: 99 % | HEIGHT: 64 IN | RESPIRATION RATE: 12 BRPM | TEMPERATURE: 98.6 F

## 2018-02-01 DIAGNOSIS — Z00.00 ROUTINE GENERAL MEDICAL EXAMINATION AT A HEALTH CARE FACILITY: ICD-10-CM

## 2018-02-01 DIAGNOSIS — S39.012D LUMBAR STRAIN, SUBSEQUENT ENCOUNTER: ICD-10-CM

## 2018-02-01 DIAGNOSIS — R63.8 WEIGHT DISORDER: Primary | ICD-10-CM

## 2018-02-01 RX ORDER — TIZANIDINE 4 MG/1
2-4 TABLET ORAL
Qty: 30 TAB | Refills: 1 | Status: SHIPPED | OUTPATIENT
Start: 2018-02-01 | End: 2018-05-17 | Stop reason: ALTCHOICE

## 2018-02-01 RX ORDER — IBUPROFEN 800 MG/1
800 TABLET ORAL
Qty: 30 TAB | Refills: 2 | Status: SHIPPED | OUTPATIENT
Start: 2018-02-01 | End: 2018-05-17 | Stop reason: HOSPADM

## 2018-02-01 NOTE — PROGRESS NOTES
HPI:  Estela Murphy is a 56y.o. year old female who is here for a routine visit:    Here for her routine follow-up visit. She continues to work on her weight and the Q-CIM you does seem to be helping. Should like to increase the dose to the maximum dose to continue this. She continues to have pain across her lower back. Physical therapy did not make a difference. She is also recently taken a Dosepak of steroids for an upper respiratory infection and that did not make any difference in her back. She has been taking ibuprofen with limited success. She is not taking the muscle relaxers as she was afraid of sedation. The pain radiates into the buttocks on both sides. There is no weakness. No falls. No change in bowel or bladder habits. Past Medical History:   Diagnosis Date    Arthritis     Other specified dorsopathies, sacral and sacrococcygeal region (CODE)        Past Surgical History:   Procedure Laterality Date    HX COLONOSCOPY  1/15/15    Dr. Roxanna Magallanes - 8 yr f/u    HX HYSTERECTOMY  2003    HX [address] PROCEDURES  2003    right shoulder    HX ORTHOPAEDIC  12/2009    bilateral knee replacement       Prior to Admission medications    Medication Sig Start Date End Date Taking? Authorizing Provider   tiZANidine (ZANAFLEX) 4 mg tablet Take 1 Tab by mouth three (3) times daily as needed. 2/1/18  Yes Hailey Whiting III, MD   ibuprofen (MOTRIN) 800 mg tablet Take 1 Tab by mouth every eight (8) hours as needed. 2/1/18  Yes Radha Harper MD   phentermine-topiramate [facility]) 15-92 mg KD74 Take 1 Tab by mouth daily. Max Daily Amount: 1 Tab. 2/1/18  Yes Radha Harper MD   QSYMIA 11.25-69 mg CM24 TAKE ONE CAPSULE BY MOUTH EVERY MORNING 8/15/17  Yes Radha Harper MD   RX AMB NIFEDIPINE 0.2 % RECTAL OINTMENT COMPOUND Nifedipine 0.2% Rectal Ointment (zinc 25mg/L-Carnitine 10mg)    Apply to anus three times daily x 7 days  Patient taking differently: as needed.  Nifedipine 0.2% Rectal Ointment (zinc 25mg/L-Carnitine 10mg)    Apply to anus three times daily x 7 days 9/15/15   Thea Stahl MD       Social History     Social History    Marital status:      Spouse name: N/A    Number of children: N/A    Years of education: N/A     Occupational History    Not on file. Social History Main Topics    Smoking status: Never Smoker    Smokeless tobacco: Never Used    Alcohol use 1.8 - 2.4 oz/week     3 - 4 Cans of beer per week      Comment: occassionally    Drug use: No    Sexual activity: Yes     Partners: Male     Birth control/ protection: Surgical     Other Topics Concern    Not on file     Social History Narrative          ROS  Per HPI    Visit Vitals    /76    Pulse 60    Temp 98.6 °F (37 °C) (Oral)    Resp 12    Ht 5' 4\" (1.626 m)    Wt 159 lb (72.1 kg)    SpO2 99%    BMI 27.29 kg/m2         Physical Exam   Physical Examination: General appearance - alert, well appearing, and in no distress  Mouth - mucous membranes moist, pharynx normal without lesions  Neck - supple, no significant adenopathy  Lymphatics - no palpable lymphadenopathy, no hepatosplenomegaly  Chest - clear to auscultation, no wheezes, rales or rhonchi, symmetric air entry  Heart - normal rate, regular rhythm, normal S1, S2, no murmurs, rubs, clicks or gallops  Abdomen - soft, nontender, nondistended, no masses or organomegaly  Back exam - limited range of motion, pain with motion noted during exam, tenderness noted along the lower back muscles and muscles of her buttocks. Negative straight leg raising. Strength is intact. Neurological - alert, oriented, normal speech, no focal findings or movement disorder noted  Musculoskeletal - no joint tenderness, deformity or swelling  Extremities - peripheral pulses normal, no pedal edema, no clubbing or cyanosis      Assessment/Plan:  Diagnoses and all orders for this visit:    1. Weight disorder -continue the to [place] and increase the dose.   She will continue to work on diet and exercise. -     phentermine-topiramate (QSYMIA) 15-92 mg CM24; Take 1 Tab by mouth daily. Max Daily Amount: 1 Tab.  -     CBC WITH AUTOMATED DIFF  -     METABOLIC PANEL, COMPREHENSIVE  -     LIPID PANEL  -     TSH RFX ON ABNORMAL TO FREE T4  -     UA/M W/RFLX CULTURE, ROUTINE    2. Routine general medical examination at a health care facility  -     APOORVA MAMMO BI SCREENING INCL CAD; Future  -     CBC WITH AUTOMATED DIFF  -     METABOLIC PANEL, COMPREHENSIVE  -     LIPID PANEL  -     TSH RFX ON ABNORMAL TO FREE T4  -     UA/M W/RFLX CULTURE, ROUTINE    3. Lumbar strain, subsequent encounter -#disc disease versus degenerative changes. Will refer for an MRI and then make a decision about an orthopedic evaluation and/or injection pending those results. -     tiZANidine (ZANAFLEX) 4 mg tablet; Take 1 Tab by mouth three (3) times daily as needed. -     MRI LUMB SPINE WO CONT; Future  -     ibuprofen (MOTRIN) 800 mg tablet; Take 1 Tab by mouth every eight (8) hours as needed. -     CBC WITH AUTOMATED DIFF  -     METABOLIC PANEL, COMPREHENSIVE  -     LIPID PANEL  -     TSH RFX ON ABNORMAL TO FREE T4  -     UA/M W/RFLX CULTURE, ROUTINE       Follow-up Disposition: as needed       Advised her to call back or return to office if symptoms worsen/change/persist.  Discussed expected course/resolution/complications of diagnosis in detail with patient. Medication risks/benefits/costs/interactions/alternatives discussed with patient. She was given an after visit summary which includes diagnoses, current medications, & vitals. She expressed understanding with the diagnosis and plan.

## 2018-02-01 NOTE — MR AVS SNAPSHOT
[address] 
[phone] Patient: Chantelle Hester MRN:  :[id number] Visit Information Date & Time Provider Department Dept. Phone Encounter #  
 2018  8:15 AM Cathy Tee MD [facility] [phone] [id number] Upcoming Health Maintenance Date Due  
 BREAST CANCER SCRN MAMMOGRAM 2018 PAP AKA CERVICAL CYTOLOGY 2020 COLONOSCOPY 2025 DTaP/Tdap/Td series (2 - Td) 2025 Allergies as of 2018  Review Complete On: 2018 By: Cathy Tee MD  
  
 Severity Noted Reaction Type Reactions Demerol [Meperidine]  2010    Itching Penicillin G  2010    Anaphylaxis Current Immunizations  Reviewed on 6/10/2016 Name Date Influenza Vaccine 10/19/2015 Influenza Vaccine (Quad) PF 10/24/2017 Tdap 2015 Not reviewed this visit You Were Diagnosed With   
  
 Codes Comments Weight disorder    -  Primary ICD-10-CM: R63.8 ICD-9-CM: 783.9 Routine general medical examination at a health care facility     ICD-10-CM: Z00.00 ICD-9-CM: V70.0 Lumbar strain, subsequent encounter     ICD-10-CM: S39.012D ICD-9-CM: V58.89, 847.2 Vitals BP Pulse Temp Resp Height(growth percentile) Weight(growth percentile) 122/76 60 98.6 °F (37 °C) (Oral) 12 5' 4\" (1.626 m) 159 lb (72.1 kg) SpO2 BMI OB Status Smoking Status 99% 27.29 kg/m2 Hysterectomy Never Smoker Vitals History BMI and BSA Data Body Mass Index Body Surface Area  
 27.29 kg/m 2 1.8 m 2 Preferred Pharmacy Pharmacy Name Phone [facility], [address] [place] [address] [phone] Your Updated Medication List  
  
   
This list is accurate as of: 18  9:27 AM.  Always use your most recent med list.  
  
  
  
  
 ibuprofen 800 mg tablet Commonly known as:  MOTRIN Take 1 Tab by mouth every eight (8) hours as needed. * QSYMIA 11.25-69 mg Cm24 Generic drug:  phentermine-topiramate TAKE ONE CAPSULE BY MOUTH EVERY MORNING  
  
 * phentermine-topiramate 15-92 mg Cm24 Commonly known as:  QSYMIA Take 1 Tab by mouth daily. Max Daily Amount: 1 Tab. RX AMB NIFEDIPINE 0.2 % RECTAL OINTMENT COMPOUND Nifedipine 0.2% Rectal Ointment (zinc 25mg/L-Carnitine 10mg)  Apply to anus three times daily x 7 days tiZANidine 4 mg tablet Commonly known as:  Starlet Blush Take 1 Tab by mouth three (3) times daily as needed. * Notice: This list has 2 medication(s) that are the same as other medications prescribed for you. Read the directions carefully, and ask your doctor or other care provider to review them with you. Prescriptions Printed Refills  
 phentermine-topiramate (QSYMIA) 15-92 mg CM24 3 Sig: Take 1 Tab by mouth daily. Max Daily Amount: 1 Tab. Class: Print Route: Oral  
  
Prescriptions Sent to Pharmacy Refills  
 tiZANidine (ZANAFLEX) 4 mg tablet 1 Sig: Take 1 Tab by mouth three (3) times daily as needed. Class: Normal  
 Pharmacy: [address] Ph #: [phone] Route: Oral  
 ibuprofen (MOTRIN) 800 mg tablet 2 Sig: Take 1 Tab by mouth every eight (8) hours as needed. Class: Normal  
 Pharmacy: [address] Ph #: [phone] Route: Oral  
  
We Performed the Following CBC WITH AUTOMATED DIFF [05717 CPT(R)] LIPID PANEL [23429 CPT(R)] METABOLIC PANEL, COMPREHENSIVE [44673 CPT(R)] TSH RFX ON ABNORMAL TO FREE T4 [[id number] Custom] UA/M W/RFLX CULTURE, ROUTINE [[id number] Custom] To-Do List   
 02/01/2018 Imaging:  MRI LUMB SPINE WO CONT   
  
 08/04/2018 Imaging:  APOORVA MAMMO BI SCREENING INCL CAD Referral Information Referral ID Referred By Referred To  
  
 [id number] Chanel BARKER Not Available Visits Status Start Date End Date 1 New Request 2/1/18 2/1/19 If your referral has a status of pending review or denied, additional information will be sent to support the outcome of this decision. Introducing [facility] & HEALTH SERVICES! [facility] introduces [employer]storm patient portal. Now you can access parts of your medical record, email your doctor's office, and request medication refills online. 1. In your internet browser, go to [email or web address] 2. Click on the First Time User? Click Here link in the Sign In box. You will see the New Member Sign Up page. 3. Enter your Brightstorm Access Code exactly as it appears below. You will not need to use this code after youve completed the sign-up process. If you do not sign up before the expiration date, you must request a new code. · Brightstorm Access Code: [email or web address] Expires: 5/2/2018  9:27 AM 
 
4. Enter the last four digits of your Social Security Number (xxxx) and Date of Birth (mm/dd/yyyy) as indicated and click Submit. You will be taken to the next sign-up page. 5. Create a Brightstorm ID. This will be your Brightstorm login ID and cannot be changed, so think of one that is secure and easy to remember. 6. Create a Brightstorm password. You can change your password at any time. 7. Enter your Password Reset Question and Answer. This can be used at a later time if you forget your password. 8. Enter your e-mail address. You will receive e-mail notification when new information is available in [address] 9. Click Sign Up. You can now view and download portions of your medical record. 10. Click the Download Summary menu link to download a portable copy of your medical information.  
 
If you have questions, please visit the Frequently Asked Questions section of the [facility]. Remember, [employer] is NOT to be used for urgent needs. For medical emergencies, dial 911. Now available from your iPhone and Android! Please provide this summary of care documentation to your next provider. Your primary care clinician is listed as Yun [phone] If you have any questions after today's visit, please call [phone].

## 2018-02-02 LAB
ALBUMIN SERPL-MCNC: 3.9 G/DL (ref 3.5–5.5)
ALBUMIN/GLOB SERPL: 1.5 {RATIO} (ref 1.2–2.2)
ALP SERPL-CCNC: 68 IU/L (ref 39–117)
ALT SERPL-CCNC: 11 IU/L (ref 0–32)
APPEARANCE UR: ABNORMAL
AST SERPL-CCNC: 15 IU/L (ref 0–40)
BACTERIA #/AREA URNS HPF: ABNORMAL /[HPF]
BASOPHILS # BLD AUTO: 0 X10E3/UL (ref 0–0.2)
BASOPHILS NFR BLD AUTO: 1 %
BILIRUB SERPL-MCNC: 0.8 MG/DL (ref 0–1.2)
BILIRUB UR QL STRIP: NEGATIVE
BUN SERPL-MCNC: 11 MG/DL (ref 6–24)
BUN/CREAT SERPL: 15 (ref 9–23)
CALCIUM SERPL-MCNC: 9.1 MG/DL (ref 8.7–10.2)
CASTS URNS QL MICRO: ABNORMAL /LPF
CHLORIDE SERPL-SCNC: 103 MMOL/L (ref 96–106)
CHOLEST SERPL-MCNC: 180 MG/DL (ref 100–199)
CO2 SERPL-SCNC: 26 MMOL/L (ref 18–29)
COLOR UR: YELLOW
CREAT SERPL-MCNC: 0.74 MG/DL (ref 0.57–1)
CRYSTALS URNS MICRO: ABNORMAL
EOSINOPHIL # BLD AUTO: 0.2 X10E3/UL (ref 0–0.4)
EOSINOPHIL NFR BLD AUTO: 6 %
EPI CELLS #/AREA URNS HPF: ABNORMAL /HPF
ERYTHROCYTE [DISTWIDTH] IN BLOOD BY AUTOMATED COUNT: 18.2 % (ref 12.3–15.4)
GFR SERPLBLD CREATININE-BSD FMLA CKD-EPI: 105 ML/MIN/1.73
GFR SERPLBLD CREATININE-BSD FMLA CKD-EPI: 91 ML/MIN/1.73
GLOBULIN SER CALC-MCNC: 2.6 G/DL (ref 1.5–4.5)
GLUCOSE SERPL-MCNC: 81 MG/DL (ref 65–99)
GLUCOSE UR QL: NEGATIVE
HCT VFR BLD AUTO: 36.8 % (ref 34–46.6)
HDLC SERPL-MCNC: 92 MG/DL
HGB BLD-MCNC: 11.4 G/DL (ref 11.1–15.9)
HGB UR QL STRIP: NEGATIVE
IMM GRANULOCYTES # BLD: 0 X10E3/UL (ref 0–0.1)
IMM GRANULOCYTES NFR BLD: 0 %
KETONES UR QL STRIP: NEGATIVE
LDLC SERPL CALC-MCNC: 78 MG/DL (ref 0–99)
LEUKOCYTE ESTERASE UR QL STRIP: NEGATIVE
LYMPHOCYTES # BLD AUTO: 1.2 X10E3/UL (ref 0.7–3.1)
LYMPHOCYTES NFR BLD AUTO: 34 %
MCH RBC QN AUTO: 23.4 PG (ref 26.6–33)
MCHC RBC AUTO-ENTMCNC: 31 G/DL (ref 31.5–35.7)
MCV RBC AUTO: 75 FL (ref 79–97)
MICRO URNS: ABNORMAL
MICRO URNS: ABNORMAL
MONOCYTES # BLD AUTO: 0.3 X10E3/UL (ref 0.1–0.9)
MONOCYTES NFR BLD AUTO: 9 %
MUCOUS THREADS URNS QL MICRO: PRESENT
NEUTROPHILS # BLD AUTO: 1.8 X10E3/UL (ref 1.4–7)
NEUTROPHILS NFR BLD AUTO: 50 %
NITRITE UR QL STRIP: NEGATIVE
PH UR STRIP: 8.5 [PH] (ref 5–7.5)
PLATELET # BLD AUTO: 321 X10E3/UL (ref 150–379)
POTASSIUM SERPL-SCNC: 4.3 MMOL/L (ref 3.5–5.2)
PROT SERPL-MCNC: 6.5 G/DL (ref 6–8.5)
PROT UR QL STRIP: NEGATIVE
RBC # BLD AUTO: 4.88 X10E6/UL (ref 3.77–5.28)
RBC #/AREA URNS HPF: ABNORMAL /HPF
SODIUM SERPL-SCNC: 145 MMOL/L (ref 134–144)
SP GR UR: 1.02 (ref 1–1.03)
TRIGL SERPL-MCNC: 50 MG/DL (ref 0–149)
TSH SERPL DL<=0.005 MIU/L-ACNC: 1.44 UIU/ML (ref 0.45–4.5)
UNIDENT CRYS URNS QL MICRO: PRESENT
URINALYSIS REFLEX, 377202: ABNORMAL
UROBILINOGEN UR STRIP-MCNC: 0.2 MG/DL (ref 0.2–1)
VLDLC SERPL CALC-MCNC: 10 MG/DL (ref 5–40)
WBC # BLD AUTO: 3.5 X10E3/UL (ref 3.4–10.8)
WBC #/AREA URNS HPF: ABNORMAL /HPF

## 2018-02-06 LAB
IRON SATN MFR SERPL: 35 % (ref 15–55)
IRON SERPL-MCNC: 92 UG/DL (ref 27–159)
SPECIMEN STATUS REPORT, ROLRST: NORMAL
TIBC SERPL-MCNC: 265 UG/DL (ref 250–450)
UIBC SERPL-MCNC: 173 UG/DL (ref 131–425)

## 2018-02-16 ENCOUNTER — OFFICE VISIT (OUTPATIENT)
Dept: INTERNAL MEDICINE CLINIC | Age: 58
End: 2018-02-16

## 2018-02-16 VITALS
SYSTOLIC BLOOD PRESSURE: 118 MMHG | HEART RATE: 91 BPM | HEIGHT: 64 IN | TEMPERATURE: 97.7 F | DIASTOLIC BLOOD PRESSURE: 76 MMHG | RESPIRATION RATE: 12 BRPM | OXYGEN SATURATION: 99 %

## 2018-02-16 DIAGNOSIS — R68.89 FLU-LIKE SYMPTOMS: Primary | ICD-10-CM

## 2018-02-16 DIAGNOSIS — J01.00 ACUTE NON-RECURRENT MAXILLARY SINUSITIS: ICD-10-CM

## 2018-02-16 RX ORDER — FLUTICASONE PROPIONATE 50 MCG
2 SPRAY, SUSPENSION (ML) NASAL DAILY
Qty: 1 BOTTLE | Refills: 1 | Status: SHIPPED | OUTPATIENT
Start: 2018-02-16 | End: 2018-05-17 | Stop reason: ALTCHOICE

## 2018-02-16 RX ORDER — PROMETHAZINE HYDROCHLORIDE AND CODEINE PHOSPHATE 6.25; 1 MG/5ML; MG/5ML
SOLUTION ORAL
Refills: 0 | COMMUNITY
Start: 2018-01-02 | End: 2018-02-16 | Stop reason: ALTCHOICE

## 2018-02-16 RX ORDER — AZITHROMYCIN 250 MG/1
250 TABLET, FILM COATED ORAL SEE ADMIN INSTRUCTIONS
Qty: 6 TAB | Refills: 0 | Status: SHIPPED | OUTPATIENT
Start: 2018-02-16 | End: 2018-02-21

## 2018-02-16 RX ORDER — GUAIFENESIN AND PSEUDOEPHEDRINE HCL 1200; 120 MG/1; MG/1
1 TABLET, EXTENDED RELEASE ORAL 2 TIMES DAILY
COMMUNITY
Start: 2018-02-16 | End: 2018-05-17 | Stop reason: ALTCHOICE

## 2018-02-16 RX ORDER — FLUCONAZOLE 150 MG/1
TABLET ORAL
Qty: 2 TAB | Refills: 0 | Status: SHIPPED | OUTPATIENT
Start: 2018-02-16 | End: 2018-05-17 | Stop reason: ALTCHOICE

## 2018-02-16 RX ORDER — CODEINE PHOSPHATE AND GUAIFENESIN 10; 100 MG/5ML; MG/5ML
5 SOLUTION ORAL
Qty: 118 ML | Refills: 0 | Status: SHIPPED | OUTPATIENT
Start: 2018-02-16 | End: 2018-05-17 | Stop reason: ALTCHOICE

## 2018-02-16 NOTE — LETTER
NOTIFICATION RETURN TO WORK / SCHOOL 
 
2/16/2018 3:50 PM 
 
Ms. Mark Mosley 
[address] [facility],[address] [place] [address] To Whom It May Concern: 
 
Moriah Greco is currently under the care of [facility]. She will return to work/school on: 2/19/18. If there are questions or concerns please have the patient contact our office. Sincerely, Doreen Kwan MD

## 2018-02-16 NOTE — MR AVS SNAPSHOT
727 [facility] [address] [facility] [address] 
[phone] Patient: Lewis Laresr MRN:  :[id number] Visit Information Date & Time Provider Department Dept. Phone Encounter #  
 2018  3:15 PM Sofia Pichardo MD [facility] Internal Medicine [phone] [id number] Upcoming Health Maintenance Date Due  
 BREAST CANCER SCRN MAMMOGRAM 2018 PAP AKA CERVICAL CYTOLOGY 2020 COLONOSCOPY 2025 DTaP/Tdap/Td series (2 - Td) 2025 Allergies as of 2018  Review Complete On: 2018 By: Sofia Pichardo MD  
  
 Severity Noted Reaction Type Reactions Demerol [Meperidine]  2010    Itching Penicillin G  2010    Anaphylaxis Current Immunizations  Reviewed on 6/10/2016 Name Date Influenza Vaccine 10/19/2015 Influenza Vaccine (Quad) PF 10/24/2017 Tdap 2015 Not reviewed this visit You Were Diagnosed With   
  
 Codes Comments Flu-like symptoms    -  Primary ICD-10-CM: R68.89 ICD-9-CM: 780.99 Acute non-recurrent maxillary sinusitis     ICD-10-CM: J01.00 ICD-9-CM: 461.0 Vitals BP Pulse Temp Resp Height(growth percentile) SpO2  
 118/76 91 97.7 °F (36.5 °C) (Oral) 12 5' 4\" (1.626 m) 99% OB Status Smoking Status Hysterectomy Never Smoker Vitals History Preferred Pharmacy Pharmacy Name Phone [facility] [address] [phone] Your Updated Medication List  
  
   
This list is accurate as of: 18  3:51 PM.  Always use your most recent med list. VU-SELTZER PLUS COLD PO Take  by mouth. azithromycin 250 mg tablet Commonly known as:  [employer] Take 1 Tab by mouth See Admin Instructions for 5 days. fluconazole 150 mg tablet Commonly known as:  DIFLUCAN  
 One for symptoms and may repeat in 5 days. fluticasone 50 mcg/actuation nasal spray Commonly known as:  Pete Sousa 2 Sprays by Both Nostrils route daily. guaiFENesin-codeine 100-10 mg/5 mL solution Commonly known as:  ROBITUSSIN AC Take 5 mL by mouth four (4) times daily as needed for Cough. Max Daily Amount: 20 mL. ibuprofen 800 mg tablet Commonly known as:  MOTRIN Take 1 Tab by mouth every eight (8) hours as needed. MUCINEX D MAXIMUM STRENGTH Tb12 extended release tablet Generic drug:  PSEUDOEPHEDRINE-guaiFENesin Take 1 Tab by mouth two (2) times a day. * QSYMIA 11.25-69 mg Cm24 Generic drug:  phentermine-topiramate TAKE ONE CAPSULE BY MOUTH EVERY MORNING  
  
 * phentermine-topiramate 15-92 mg Cm24 Commonly known as:  QSYMIA Take 1 Tab by mouth daily. Max Daily Amount: 1 Tab. RX AMB NIFEDIPINE 0.2 % RECTAL OINTMENT COMPOUND Nifedipine 0.2% Rectal Ointment (zinc 25mg/L-Carnitine 10mg)  Apply to anus three times daily x 7 days tiZANidine 4 mg tablet Commonly known as:  Juliocesar Rumble Take 1 Tab by mouth three (3) times daily as needed. * Notice: This list has 2 medication(s) that are the same as other medications prescribed for you. Read the directions carefully, and ask your doctor or other care provider to review them with you. Prescriptions Printed Refills  
 guaiFENesin-codeine (ROBITUSSIN AC) 100-10 mg/5 mL solution 0 Sig: Take 5 mL by mouth four (4) times daily as needed for Cough. Max Daily Amount: 20 mL. Class: Print Route: Oral  
  
Prescriptions Sent to Pharmacy Refills  
 azithromycin (ZITHROMAX) 250 mg tablet 0 Sig: Take 1 Tab by mouth See Admin Instructions for 5 days. Class: Normal  
 Pharmacy: [facility] [address] #: [phone]  Route: Oral  
 fluticasone (FLONASE) 50 mcg/actuation nasal spray 1  
 Si Sprays by Both Nostrils route daily. Class: Normal  
 Pharmacy: [facility] Ph #: [phone] Route: Both Nostrils  
 fluconazole (DIFLUCAN) 150 mg tablet 0 Sig: One for symptoms and may repeat in 5 days. Class: Normal  
 Pharmacy: [address] Ph #: [phone] We Performed the Following AMB POC KEMI INFLUENZA A/B TEST [01291 CPT(R)] Introducing [facility] SERVICES! [facility] introduces [employer] Service patient portal. Now you can access parts of your medical record, email your doctor's office, and request medication refills online. 1. In your internet browser, go to [email or web address] 2. Click on the First Time User? Click Here link in the Sign In box. You will see the New Member Sign Up page. 3. Enter your [employer] Access Code exactly as it appears below. You will not need to use this code after youve completed the sign-up process. If you do not sign up before the expiration date, you must request a new code. · [employer] Access Code: [email or web address] Expires: 2018  9:27 AM 
 
4. Enter the last four digits of your Social Security Number (xxxx) and Date of Birth (mm/dd/yyyy) as indicated and click Submit. You will be taken to the next sign-up page. 5. Create a Goblinworkst ID. This will be your E[employer] login ID and cannot be changed, so think of one that is secure and easy to remember. 6. Create a E[employer] password. You can change your password at any time. 7. Enter your Password Reset Question and Answer. This can be used at a later time if you forget your password. 8. Enter your e-mail address. You will receive e-mail notification when new information is available in [address] 9. Click Sign Up. You can now view and download portions of your medical record. 10. Click the Download Summary menu link to download a portable copy of your medical information. If you have questions, please visit the Frequently Asked Questions section of the [employer] website. Remember, [employer] is NOT to be used for urgent needs. For medical emergencies, dial 911. Now available from your iPhone and Android! Please provide this summary of care documentation to your next provider. Your primary care clinician is listed as Yun [id number] If you have any questions after today's visit, please call [phone].

## 2018-02-17 NOTE — PROGRESS NOTES
Subjective:   Pasha Hancock is a 56 y.o. female who complains of congestion, sore throat, post nasal drip, productive cough and bilateral sinus pain for 5 days, gradually worsening since that time. She denies a history of nausea, shortness of breath, sweats, vomiting and wheezing. Evaluation to date: none. Treatment to date: OTC products. Patient does not smoke cigarettes. Relevant PMH: No pertinent additional PMH. Patient Active Problem List    Diagnosis Date Noted    Advance directive declined by patient 07/06/2016     Current Outpatient Prescriptions   Medication Sig Dispense Refill    PSEUDOEPHED/ACETAMINOPHEN/CPM (VU-SELTZER PLUS COLD PO) Take  by mouth.  azithromycin (ZITHROMAX) 250 mg tablet Take 1 Tab by mouth See Admin Instructions for 5 days. 6 Tab 0    fluticasone (FLONASE) 50 mcg/actuation nasal spray 2 Sprays by Both Nostrils route daily. 1 Bottle 1    fluconazole (DIFLUCAN) 150 mg tablet One for symptoms and may repeat in 5 days. 2 Tab 0    PSEUDOEPHEDRINE-guaiFENesin (MUCINEX D MAXIMUM STRENGTH) Tb12 extended release tablet Take 1 Tab by mouth two (2) times a day.  guaiFENesin-codeine (ROBITUSSIN AC) 100-10 mg/5 mL solution Take 5 mL by mouth four (4) times daily as needed for Cough. Max Daily Amount: 20 mL. 118 mL 0    tiZANidine (ZANAFLEX) 4 mg tablet Take 1 Tab by mouth three (3) times daily as needed. 30 Tab 1    ibuprofen (MOTRIN) 800 mg tablet Take 1 Tab by mouth every eight (8) hours as needed. 30 Tab 2    phentermine-topiramate (QSYMIA) 15-92 mg CM24 Take 1 Tab by mouth daily. Max Daily Amount: 1 Tab. 30 Cap 3    RX AMB NIFEDIPINE 0.2 % RECTAL OINTMENT COMPOUND Nifedipine 0.2% Rectal Ointment (zinc 25mg/L-Carnitine 10mg)    Apply to anus three times daily x 7 days (Patient taking differently: as needed.  Nifedipine 0.2% Rectal Ointment (zinc 25mg/L-Carnitine 10mg)    Apply to anus three times daily x 7 days) 60 g 1    QSYMIA 11.25-69 mg CM24 TAKE ONE CAPSULE BY MOUTH EVERY MORNING 30 Cap 2     Allergies   Allergen Reactions    Demerol [Meperidine] Itching    Penicillin G Anaphylaxis     Past Medical History:   Diagnosis Date    Arthritis     Other specified dorsopathies, sacral and sacrococcygeal region (CODE)      Past Surgical History:   Procedure Laterality Date    HX COLONOSCOPY  1/15/15    Dr. Samm Tim - 8 yr f/u    HX HYSTERECTOMY  2003    HX MOHS PROCEDURES  2003    right shoulder    HX ORTHOPAEDIC  12/2009    bilateral knee replacement     Family History   Problem Relation Age of Onset    Family history unknown: Yes     Social History   Substance Use Topics    Smoking status: Never Smoker    Smokeless tobacco: Never Used    Alcohol use 1.8 - 2.4 oz/week     3 - 4 Cans of beer per week      Comment: occassionally        Review of Systems  Pertinent items are noted in HPI. Objective:     Visit Vitals    /76    Pulse 91    Temp 97.7 °F (36.5 °C) (Oral)    Resp 12    Ht 5' 4\" (1.626 m)    SpO2 99%     General:  alert, cooperative, no distress   Eyes: negative   Ears: abnormal TM AS - dull, serous middle ear fluid   Sinuses: tenderness over bilateral maxillary   Mouth:  Lips, mucosa, and tongue normal. Teeth and gums normal   Neck: supple, symmetrical, trachea midline and no adenopathy. Heart: S1 and S2 normal, no murmurs noted. Lungs: clear to auscultation bilaterally   Abdomen: soft, non-tender. Bowel sounds normal. No masses,  no organomegaly        Assessment/Plan:   serous otitis and sinusitis  Orders Placed This Encounter    AMB POC KEMI INFLUENZA A/B TEST    PSEUDOEPHED/ACETAMINOPHEN/CPM (VU-SELTZER PLUS COLD PO)     Sig: Take  by mouth.  DISCONTD: promethazine-codeine (PHENERGAN WITH CODEINE) 6.25-10 mg/5 mL syrup     Sig: TK 5ML PO Q 4 H PRF COUGH     Refill:  0    azithromycin (ZITHROMAX) 250 mg tablet     Sig: Take 1 Tab by mouth See Admin Instructions for 5 days.      Dispense:  6 Tab     Refill:  0    fluticasone (FLONASE) 50 mcg/actuation nasal spray     Si Sprays by Both Nostrils route daily. Dispense:  1 Bottle     Refill:  1    fluconazole (DIFLUCAN) 150 mg tablet     Sig: One for symptoms and may repeat in 5 days. Dispense:  2 Tab     Refill:  0    PSEUDOEPHEDRINE-guaiFENesin (MUCINEX D MAXIMUM STRENGTH) Tb12 extended release tablet     Sig: Take 1 Tab by mouth two (2) times a day.  guaiFENesin-codeine (ROBITUSSIN AC) 100-10 mg/5 mL solution     Sig: Take 5 mL by mouth four (4) times daily as needed for Cough. Max Daily Amount: 20 mL. Dispense:  118 mL     Refill:  0     Advised her to call back or return to office if symptoms worsen/change/persist.  Discussed expected course/resolution/complications of diagnosis in detail with patient. Medication risks/benefits/costs/interactions/alternatives discussed with patient. She was given an after visit summary which includes diagnoses, current medications, & vitals. She expressed understanding with the diagnosis and plan. Ezequiel Kinsey

## 2018-04-12 ENCOUNTER — TELEPHONE (OUTPATIENT)
Dept: INTERNAL MEDICINE CLINIC | Age: 58
End: 2018-04-12

## 2018-04-25 ENCOUNTER — HOSPITAL ENCOUNTER (OUTPATIENT)
Dept: MRI IMAGING | Age: 58
Discharge: HOME | End: 2018-04-25
Attending: INTERNAL MEDICINE
Payer: COMMERCIAL

## 2018-04-25 DIAGNOSIS — S39.012D LUMBAR STRAIN, SUBSEQUENT ENCOUNTER: ICD-10-CM

## 2018-04-25 PROCEDURE — 72148 MRI LUMBAR SPINE W/O DYE: CPT

## 2018-04-26 ENCOUNTER — TELEPHONE (OUTPATIENT)
Dept: INTERNAL MEDICINE CLINIC | Age: 58
End: 2018-04-26

## 2018-04-26 NOTE — TELEPHONE ENCOUNTER
Spoke with pt in ref to MRI L Spine. Per [facility], shows DDD and arthritis. Info given for 's Saira Browne and [email or web address]. To see PA if first available. She will let me know if she has any problems with appt. Pt not sure she will be able to go back to work next week because of pain. She is on vacation this week. Called pt back to make sure that she is taking the 800 mg of ibuprofen as directed. She has not been taking it as directed. She will start taking it q8 to see if this gives any relief.

## 2018-04-26 NOTE — TELEPHONE ENCOUNTER
----- Message from Dav Alex MD sent at 4/25/2018  5:46 PM EDT -----  Notify disc disease and arthritis - see ortho - clay or [facility].

## 2018-05-10 ENCOUNTER — TELEPHONE (OUTPATIENT)
Dept: INTERNAL MEDICINE CLINIC | Age: 58
End: 2018-05-10

## 2018-05-10 NOTE — TELEPHONE ENCOUNTER
Spoke with patient, patient still having back pain. MRI done and results read by Dr Adams Cartwright, recommended Orthopedics. Patient denies any urinary symptoms. Has appointment on 5/21. Advised will call for sooner appt. Scheduled with [facility] today, PA [facility] [address] [phone]. Pt verbalized understanding.  Faxed MRI results and office note dated 2/1/2018 to [phone]

## 2018-05-17 ENCOUNTER — OFFICE VISIT (OUTPATIENT)
Dept: INTERNAL MEDICINE CLINIC | Age: 58
End: 2018-05-17

## 2018-05-17 VITALS
OXYGEN SATURATION: 100 % | DIASTOLIC BLOOD PRESSURE: 84 MMHG | SYSTOLIC BLOOD PRESSURE: 138 MMHG | WEIGHT: 158 LBS | HEART RATE: 87 BPM | TEMPERATURE: 98.6 F | BODY MASS INDEX: 26.98 KG/M2 | RESPIRATION RATE: 8 BRPM | HEIGHT: 64 IN

## 2018-05-17 DIAGNOSIS — M54.16 LUMBAR RADICULAR PAIN: Primary | ICD-10-CM

## 2018-05-17 RX ORDER — GABAPENTIN 300 MG/1
300 CAPSULE ORAL
COMMUNITY
End: 2019-04-05

## 2018-05-17 RX ORDER — MELOXICAM 15 MG/1
15 TABLET ORAL DAILY
COMMUNITY
End: 2018-12-28 | Stop reason: ALTCHOICE

## 2018-06-04 ENCOUNTER — TELEPHONE (OUTPATIENT)
Dept: INTERNAL MEDICINE CLINIC | Age: 58
End: 2018-06-04

## 2018-06-07 ENCOUNTER — TELEPHONE (OUTPATIENT)
Dept: INTERNAL MEDICINE CLINIC | Age: 58
End: 2018-06-07

## 2018-07-06 DIAGNOSIS — R63.8 WEIGHT DISORDER: ICD-10-CM

## 2018-07-06 RX ORDER — PHENTERMINE AND TOPIRAMATE 15; 92 MG/1; MG/1
CAPSULE, EXTENDED RELEASE ORAL
Qty: 30 CAP | Refills: 1 | OUTPATIENT
Start: 2018-07-06 | End: 2018-09-27 | Stop reason: SDUPTHER

## 2018-07-09 NOTE — TELEPHONE ENCOUNTER
Orders Placed This Encounter    QSYMIA 15-92 mg CM24     Sig: TAKE ONE CAPSULE BY MOUTH EVERY DAY     Dispense:  30 Cap     Refill:  1     The above controlled substance refill was called into [facility] pharmacy - [facility]- authorized by Dr. Wesly Moran.

## 2018-09-27 DIAGNOSIS — R63.8 WEIGHT DISORDER: ICD-10-CM

## 2018-09-27 RX ORDER — PHENTERMINE AND TOPIRAMATE 15; 92 MG/1; MG/1
CAPSULE, EXTENDED RELEASE ORAL
Qty: 30 CAP | Refills: 0 | OUTPATIENT
Start: 2018-09-27 | End: 2018-11-14 | Stop reason: SDUPTHER

## 2018-09-27 NOTE — TELEPHONE ENCOUNTER
Orders Placed This Encounter    QSYMIA 15-92 mg CM24     Sig: TAKE ONE CAPSULE BY MOUTH EVERY DAY     Dispense:  30 Cap     Refill:  0     The above controlled substance refill was called into [facility] pharmacy - [facility] - authorized by Dr. Audrey Lovell.

## 2018-10-09 ENCOUNTER — OFFICE VISIT (OUTPATIENT)
Dept: INTERNAL MEDICINE CLINIC | Age: 58
End: 2018-10-09

## 2018-10-09 VITALS
SYSTOLIC BLOOD PRESSURE: 138 MMHG | WEIGHT: 159.2 LBS | DIASTOLIC BLOOD PRESSURE: 72 MMHG | HEART RATE: 89 BPM | TEMPERATURE: 98 F | HEIGHT: 64 IN | BODY MASS INDEX: 27.18 KG/M2 | OXYGEN SATURATION: 99 % | RESPIRATION RATE: 16 BRPM

## 2018-10-09 DIAGNOSIS — J00 ACUTE RHINITIS: ICD-10-CM

## 2018-10-09 DIAGNOSIS — Z91.09 ENVIRONMENTAL ALLERGIES: Primary | ICD-10-CM

## 2018-10-09 RX ORDER — MINERAL OIL
180 ENEMA (ML) RECTAL DAILY
Qty: 30 TAB | Refills: 11 | COMMUNITY
End: 2018-12-28 | Stop reason: ALTCHOICE

## 2018-10-09 RX ORDER — IBUPROFEN 600 MG/1
TABLET ORAL
Refills: 0 | COMMUNITY
Start: 2018-09-26 | End: 2019-04-05

## 2018-10-09 RX ORDER — GUAIFENESIN AND DEXTROMETHORPHAN HYDROBROMIDE 1200; 60 MG/1; MG/1
TABLET, EXTENDED RELEASE ORAL
COMMUNITY
Start: 2018-10-09 | End: 2018-12-28 | Stop reason: ALTCHOICE

## 2018-10-09 RX ORDER — CLINDAMYCIN HYDROCHLORIDE 300 MG/1
CAPSULE ORAL
Refills: 0 | COMMUNITY
Start: 2018-09-26 | End: 2018-12-28 | Stop reason: ALTCHOICE

## 2018-10-09 NOTE — MR AVS SNAPSHOT
727 [facility] [address] [place] [address] 
[phone] Patient: Jesusita Moran MRN:  :[id number] Visit Information Date & Time Provider Department Dept. Phone Encounter #  
 10/9/2018  4:00 PM Yaima Lopez, [address] [facility] Internal Medicine [phone] [id number] Follow-up Instructions Return if symptoms worsen or fail to improve. Upcoming Health Maintenance Date Due Shingrix Vaccine Age 50> (1 of 2) 2012 BREAST CANCER SCRN MAMMOGRAM 2018 Influenza Age 5 to Adult 2018 PAP AKA CERVICAL CYTOLOGY 2020 COLONOSCOPY 2025 DTaP/Tdap/Td series (2 - Td) 2025 Allergies as of 10/9/2018  Review Complete On: 10/9/2018 By: Chang Schwartz LPN Severity Noted Reaction Type Reactions Demerol [Meperidine]  2010    Itching Penicillin G  2010    Anaphylaxis Current Immunizations  Reviewed on 6/10/2016 Name Date Influenza Vaccine 10/19/2015 Influenza Vaccine (Quad) PF 10/24/2017 Tdap 2015 Not reviewed this visit You Were Diagnosed With   
  
 Codes Comments Environmental allergies    -  Primary ICD-10-CM: Z91.09 
ICD-9-CM: V15.09 Acute rhinitis     ICD-10-CM: Sable [place]affee ICD-9-CM: 901 Vitals BP Pulse Temp Resp Height(growth percentile) Weight(growth percentile) 138/72 89 98 °F (36.7 °C) (Oral) 16 5' 4\" (1.626 m) 159 lb 3.2 oz (72.2 kg) SpO2 BMI OB Status Smoking Status 99% 27.33 kg/m2 Hysterectomy Never Smoker Vitals History BMI and BSA Data Body Mass Index Body Surface Area  
 27.33 kg/m 2 1.81 m 2 Preferred Pharmacy Pharmacy Name Phone [facility] [place], [facility] [phone] Your Updated Medication List  
  
   
This list is accurate as of 10/9/18  4:37 PM.  Always use your most recent med list.  
  
  
  
 clindamycin 300 mg capsule Commonly known as:  CLEOCIN  
TK ONE C PO QID UNTIL GONE  
  
 fexofenadine 180 mg tablet Commonly known as:  Chuy May Take 1 Tab by mouth daily. As needed for allergy  
  
 gabapentin 300 mg capsule Commonly known as:  NEURONTIN Take 300 mg by mouth nightly. ibuprofen 600 mg tablet Commonly known as:  MOTRIN  
TK 1 T PO Q 6 H  
  
 MOBIC 15 mg tablet Generic drug:  meloxicam  
Take 15 mg by mouth daily. MUCINEX DM 60-1,200 mg Tb12 Generic drug:  dextromethorphan-guaiFENesin Take  by mouth. QSYMIA 15-92 mg Cm24 Generic drug:  phentermine-topiramate TAKE ONE CAPSULE BY MOUTH EVERY DAY Follow-up Instructions Return if symptoms worsen or fail to improve. Introducing [facility] & HEALTH SERVICES! [address] [facility] introduces [employer] patient portal. Now you can access parts of your medical record, email your doctor's office, and request medication refills online. 1. In your internet browser, go to [email or web address] 2. Click on the First Time User? Click Here link in the Sign In box. You will see the New Member Sign Up page. 3. Enter your [employer] Access Code exactly as it appears below. You will not need to use this code after youve completed the sign-up process. If you do not sign up before the expiration date, you must request a new code. · [employer] Access Code: [email or web address] Expires: 1/7/2019  3:33 PM 
 
4. Enter the last four digits of your Social Security Number (xxxx) and Date of Birth (mm/dd/yyyy) as indicated and click Submit. You will be taken to the next sign-up page. 5. Create a [employer] ID. This will be your [employer] login ID and cannot be changed, so think of one that is secure and easy to remember. 6. Create a [employer] password. You can change your password at any time. 7. Enter your Password Reset Question and Answer. This can be used at a later time if you forget your password. 8. Enter your e-mail address. You will receive e-mail notification when new information is available in [address] 9. Click Sign Up. You can now view and download portions of your medical record. 10. Click the Download Summary menu link to download a portable copy of your medical information. If you have questions, please visit the Frequently Asked Questions section of the [facility] website. Remember, [facility] is NOT to be used for urgent needs. For medical emergencies, dial 911. Now available from your iPhone and Android! Please provide this summary of care documentation to your next provider. Your primary care clinician is listed as Yun [phone] If you have any questions after today's visit, please call [phone].

## 2018-10-09 NOTE — PROGRESS NOTES
Chief Complaint   Patient presents with    Cough    Hoarse     Patient states she is here for hoarseness and cough. Patient states she has dry cough which also keeps her up at night. Patient states the cough and hoarseness is getting worse and has had these symptoms for over a week. Patient states she had her wisdom teeth pulled last week and the symptoms started the day after.

## 2018-10-09 NOTE — PROGRESS NOTES
Subjective:   Jesusita Moran is a 56 y.o. female who complains of post nasal drip, dry cough and hoarseness for 7 days, gradually worsening since that time. She denies a history of fevers, shortness of breath and wheezing. Denies fall allergies. Evaluation to date: none. Treatment to date: OTC products. Patient does not smoke cigarettes. Relevant PMH: No pertinent additional PMH. Current Outpatient Prescriptions   Medication Sig Dispense Refill    clindamycin (CLEOCIN) 300 mg capsule TK ONE C PO QID UNTIL GONE  0    QSYMIA 15-92 mg CM24 TAKE ONE CAPSULE BY MOUTH EVERY DAY 30 Cap 0    meloxicam (MOBIC) 15 mg tablet Take 15 mg by mouth daily. Allergies   Allergen Reactions    Demerol [Meperidine] Itching    Penicillin G Anaphylaxis        Review of Systems  Pertinent items are noted in HPI. Objective:     Visit Vitals    /72    Pulse 89    Temp 98 °F (36.7 °C) (Oral)    Resp 16    Ht 5' 4\" (1.626 m)    Wt 159 lb 3.2 oz (72.2 kg)    SpO2 99%    BMI 27.33 kg/m2     General:  alert, cooperative, no distress   Eyes: negative   Ears: normal TM's and external ear canals AU   Sinuses:  Nasal congestion present, sinuses nontender. Mouth:  normal findings: oropharynx pink & moist without lesions or evidence of thrush   Neck: supple, symmetrical, trachea midline and no adenopathy. Heart: normal rate and regular rhythm. Lungs: clear to auscultation bilaterally   Abdomen: soft, non-tender. Bowel sounds normal. No masses,  no organomegaly        Assessment/Plan:   {  Encounter Diagnoses   Name Primary?  Environmental allergies Yes    Acute rhinitis and cough likely allergic but cannot rule out a viral infection. Zyrtec and Flonase for seasonal allergies. Mucinex DM for cough. I have discussed the diagnosis with the patient and the intended plan as seen in the above orders. The patient has received an after-visit summary and questions were answered concerning future plans.   I have discussed medication side effects and warnings with the patient as well. She has expressed understanding of the diagnosis and plan    Follow-up Disposition:  Return if symptoms worsen or fail to improve.

## 2018-11-14 DIAGNOSIS — R63.8 WEIGHT DISORDER: ICD-10-CM

## 2018-11-14 RX ORDER — PHENTERMINE AND TOPIRAMATE 15; 92 MG/1; MG/1
CAPSULE, EXTENDED RELEASE ORAL
Qty: 30 CAP | Refills: 2 | OUTPATIENT
Start: 2018-11-14 | End: 2018-11-17 | Stop reason: SDUPTHER

## 2018-11-15 NOTE — TELEPHONE ENCOUNTER
Orders Placed This Encounter    QSYMIA 15-92 mg CM24     Sig: TAKE 1 CAPSULE BY MOUTH EVERY DAY     Dispense:  30 Cap     Refill:  2     The above controlled substance refill was called into patients pharmacy - [facility] - authorized by Dr. Zelpha Frankel.

## 2018-11-17 DIAGNOSIS — R63.8 WEIGHT DISORDER: ICD-10-CM

## 2018-11-18 RX ORDER — PHENTERMINE AND TOPIRAMATE 15; 92 MG/1; MG/1
CAPSULE, EXTENDED RELEASE ORAL
Qty: 30 CAP | Refills: 2 | OUTPATIENT
Start: 2018-11-18 | End: 2018-12-20 | Stop reason: SDUPTHER

## 2018-11-19 ENCOUNTER — HOSPITAL ENCOUNTER (OUTPATIENT)
Dept: GENERAL RADIOLOGY | Age: 58
Discharge: HOME | End: 2018-11-19
Payer: COMMERCIAL

## 2018-11-19 DIAGNOSIS — R52 PAIN: ICD-10-CM

## 2018-11-19 PROCEDURE — 72110 X-RAY EXAM L-2 SPINE 4/>VWS: CPT

## 2018-11-19 PROCEDURE — 73521 X-RAY EXAM HIPS BI 2 VIEWS: CPT

## 2018-12-20 DIAGNOSIS — R63.8 WEIGHT DISORDER: ICD-10-CM

## 2018-12-20 RX ORDER — PHENTERMINE AND TOPIRAMATE 15; 92 MG/1; MG/1
CAPSULE, EXTENDED RELEASE ORAL
Qty: 30 CAP | Refills: 0 | OUTPATIENT
Start: 2018-12-20 | End: 2019-01-26 | Stop reason: SDUPTHER

## 2018-12-21 NOTE — TELEPHONE ENCOUNTER
Orders Placed This Encounter    QSYMIA 15-92 mg CM24     Sig: TAKE 1 CAPSULE BY MOUTH EVERY DAY     Dispense:  30 Cap     Refill:  0     The above controlled substance refill was called into patients pharmacy - [facility] - authorized by Dr. Yan Carmona.

## 2018-12-24 ENCOUNTER — TELEPHONE (OUTPATIENT)
Dept: INTERNAL MEDICINE CLINIC | Age: 58
End: 2018-12-24

## 2018-12-24 NOTE — TELEPHONE ENCOUNTER
Please call pharmacy re:  Janett Shruthi is not covered but will cover the two separate meds in this medication.   [phone]

## 2018-12-26 NOTE — TELEPHONE ENCOUNTER
Spoke with pt- she has picked up her refill for the Qsymia. She does not use her insurance she pays for it. No further needs.

## 2018-12-28 ENCOUNTER — OFFICE VISIT (OUTPATIENT)
Dept: FAMILY MEDICINE CLINIC | Age: 58
End: 2018-12-28

## 2018-12-28 VITALS
TEMPERATURE: 98.2 F | HEIGHT: 64 IN | WEIGHT: 168.4 LBS | RESPIRATION RATE: 18 BRPM | SYSTOLIC BLOOD PRESSURE: 138 MMHG | HEART RATE: 84 BPM | BODY MASS INDEX: 28.75 KG/M2 | OXYGEN SATURATION: 99 % | DIASTOLIC BLOOD PRESSURE: 75 MMHG

## 2018-12-28 DIAGNOSIS — J06.9 UPPER RESPIRATORY TRACT INFECTION, UNSPECIFIED TYPE: Primary | ICD-10-CM

## 2018-12-28 RX ORDER — AZITHROMYCIN 250 MG/1
TABLET, FILM COATED ORAL
Qty: 6 TAB | Refills: 0 | Status: SHIPPED | OUTPATIENT
Start: 2018-12-28 | End: 2019-04-05

## 2018-12-28 NOTE — LETTER
NOTIFICATION RETURN TO WORK / SCHOOL 
 
12/28/2018 3:00 PM 
 
Ms. Smith [address] [facility] [address] [facility] [address] To Whom It May Concern: 
 
Francis Espinal is currently under the care of [address] [place] [address]. She will return to work/school on: 12/31/18 If there are questions or concerns please have the patient contact our office.  
 
 
 
Sincerely, 
 
 
Yola Zepeda NP

## 2018-12-28 NOTE — PROGRESS NOTES
Nichol Pulido is a 66 y.o. female    Chief Complaint   Patient presents with    Cold    Cough    Nasal Congestion    Diarrhea       1. Have you been to the ER, urgent care clinic since your last visit? Hospitalized since your last visit? No  M  2. Have you seen or consulted any other health care providers outside of the [address] [place] [address] since your last visit? Include any pap smears or colon screening. No      Visit Vitals  /75 (BP 1 Location: Left arm, BP Patient Position: Sitting)   Pulse 84   Temp 98.2 °F (36.8 °C) (Oral)   Resp 18   Ht 5' 4\" (1.626 m)   Wt 168 lb 6.4 oz (76.4 kg)   SpO2 99%   BMI 28.91 kg/m²           Health Maintenance Due   Topic Date Due    Shingrix Vaccine Age 49> (1 of 2) 11/09/2012    BREAST CANCER SCRN MAMMOGRAM  01/06/2018    Influenza Age 5 to Adult  08/01/2018         Medication Reconciliation completed, changes noted.   Please  Update medication list.

## 2018-12-28 NOTE — PROGRESS NOTES
Subjective:   Lane Josue is a 66 y.o. female who complains of congestion, sore throat, nasal blockage, dry cough, bilateral sinus pain and fever for 2 days, rapidly worsening since that time. She denies a history of shortness of breath and wheezing. Evaluation to date: none. Treatment to date: OTC products. Patient does not smoke cigarettes. Relevant PMH: No pertinent additional PMH. Patient Active Problem List   Diagnosis Code    Advance directive declined by patient Z78.9     Patient Active Problem List    Diagnosis Date Noted    Advance directive declined by patient 2016     Current Outpatient Medications   Medication Sig Dispense Refill    azithromycin (ZITHROMAX) 250 mg tablet Take by mouth, take two tablets today then one tablet daily for 4 more days. 6 Tab 0    QSYMIA 15-92 mg CM24 TAKE 1 CAPSULE BY MOUTH EVERY DAY 30 Cap 0    ibuprofen (MOTRIN) 600 mg tablet TK 1 T PO Q 6 H  0    gabapentin (NEURONTIN) 300 mg capsule Take 300 mg by mouth nightly.  fexofenadine (ALLEGRA) 180 mg tablet Take 1 Tab by mouth daily. As needed for allergy 30 Tab 11    dextromethorphan-guaiFENesin (MUCINEX DM) 60-1,200 mg Tb12 Take  by mouth.  meloxicam (MOBIC) 15 mg tablet Take 15 mg by mouth daily.        Allergies   Allergen Reactions    Demerol [Meperidine] Itching    Penicillin G Anaphylaxis     Past Medical History:   Diagnosis Date    Arthritis     Other specified dorsopathies, sacral and sacrococcygeal region (CODE)      Past Surgical History:   Procedure Laterality Date    HX COLONOSCOPY  1/15/15    Dr. Wale Corrigan - 8 yr f/u    HX HYSTERECTOMY     [address] [facility] PROCEDURES      right shoulder    HX ORTHOPAEDIC  2009    bilateral knee replacement     Family History   Family history unknown: Yes     Social History     Tobacco Use    Smoking status: Former Smoker     Years: 2.00     Last attempt to quit: 2000     Years since quittin.0    Smokeless tobacco: Never Used Substance Use Topics    Alcohol use: Yes     Alcohol/week: 1.8 - 2.4 oz     Types: 3 - 4 Cans of beer per week     Comment: occassionally        Review of Systems  Pertinent items are noted in HPI. Objective:     Visit Vitals  /75 (BP 1 Location: Left arm, BP Patient Position: Sitting)   Pulse 84   Temp 98.2 °F (36.8 °C) (Oral)   Resp 18   Ht 5' 4\" (1.626 m)   Wt 168 lb 6.4 oz (76.4 kg)   SpO2 99%   BMI 28.91 kg/m²     General:  alert, cooperative, no distress   Eyes: negative   Ears: Ears deep red bilaterally, no bulging   Sinuses: Normal paranasal sinuses without tenderness   Mouth:  Lips, mucosa, and tongue normal. Teeth and gums normal   Neck: supple, symmetrical, trachea midline and no adenopathy. Heart: S1 and S2 normal, no murmurs noted. Lungs: clear to auscultation bilaterally   Abdomen: soft, non-tender. Bowel sounds normal. No masses,  no organomegaly        Assessment/Plan:   otitis media  Discussed dx and tx of URIs  Suggested symptomatic OTC remedies. Antibiotics per orders. RTC prn. ICD-10-CM ICD-9-CM    1. Upper respiratory tract infection, unspecified type J06.9 465.9 azithromycin (ZITHROMAX) 250 mg tablet   .

## 2018-12-28 NOTE — PATIENT INSTRUCTIONS
Cough: Care Instructions  Your Care Instructions    A cough is your body's response to something that bothers your throat or airways. Many things can cause a cough. You might cough because of a cold or the flu, bronchitis, or asthma. Smoking, postnasal drip, allergies, and stomach acid that backs up into your throat also can cause coughs. A cough is a symptom, not a disease. Most coughs stop when the cause, such as a cold, goes away. You can take a few steps at home to cough less and feel better. Follow-up care is a key part of your treatment and safety. Be sure to make and go to all appointments, and call your doctor if you are having problems. It's also a good idea to know your test results and keep a list of the medicines you take. How can you care for yourself at home? · Drink lots of water and other fluids. This helps thin the mucus and soothes a dry or sore throat. Honey or lemon juice in hot water or tea may ease a dry cough. · Take cough medicine as directed by your doctor. · Prop up your head on pillows to help you breathe and ease a dry cough. · Try cough drops to soothe a dry or sore throat. Cough drops don't stop a cough. Medicine-flavored cough drops are no better than candy-flavored drops or hard candy. · Do not smoke. Avoid secondhand smoke. If you need help quitting, talk to your doctor about stop-smoking programs and medicines. These can increase your chances of quitting for good. When should you call for help? Call 911 anytime you think you may need emergency care.  For example, call if:    · You have severe trouble breathing.    Call your doctor now or seek immediate medical care if:    · You cough up blood.     · You have new or worse trouble breathing.     · You have a new or higher fever.     · You have a new rash.    Watch closely for changes in your health, and be sure to contact your doctor if:    · You cough more deeply or more often, especially if you notice more mucus or a change in the color of your mucus.     · You have new symptoms, such as a sore throat, an earache, or sinus pain.     · You do not get better as expected. Where can you learn more? Go to http://ginger-leon.info/. Enter D279 in the search box to learn more about \"Cough: Care Instructions. \"  Current as of: December 6, 2017  Content Version: 11.8  © 0626-0658 [employer]. Care instructions adapted under license by [employer] (which disclaims liability or warranty for this information). If you have questions about a medical condition or this instruction, always ask your healthcare professional. [address] any warranty or liability for your use of this information.

## 2019-01-26 DIAGNOSIS — R63.8 WEIGHT DISORDER: ICD-10-CM

## 2019-01-27 RX ORDER — PHENTERMINE AND TOPIRAMATE 15; 92 MG/1; MG/1
CAPSULE, EXTENDED RELEASE ORAL
Qty: 30 CAP | Refills: 0 | OUTPATIENT
Start: 2019-01-27 | End: 2019-04-08 | Stop reason: SDUPTHER

## 2019-01-28 NOTE — TELEPHONE ENCOUNTER
Orders Placed This Encounter    QSYMIA 15-92 mg CM24     Sig: TAKE ONE CAPSULE BY MOUTH EVERY DAY     Dispense:  30 Cap     Refill:  0     The above controlled substance refill was called into patients pharmacy - [facility] - authorized by Dr. Kurt Calhoun.

## 2019-04-05 ENCOUNTER — OFFICE VISIT (OUTPATIENT)
Dept: INTERNAL MEDICINE CLINIC | Age: 59
End: 2019-04-05

## 2019-04-05 VITALS
SYSTOLIC BLOOD PRESSURE: 132 MMHG | HEART RATE: 85 BPM | DIASTOLIC BLOOD PRESSURE: 72 MMHG | WEIGHT: 176 LBS | TEMPERATURE: 98.2 F | OXYGEN SATURATION: 96 % | BODY MASS INDEX: 30.05 KG/M2 | HEIGHT: 64 IN | RESPIRATION RATE: 18 BRPM

## 2019-04-05 DIAGNOSIS — G89.29 CHRONIC RIGHT-SIDED LOW BACK PAIN WITH RIGHT-SIDED SCIATICA: Primary | ICD-10-CM

## 2019-04-05 DIAGNOSIS — M54.41 CHRONIC RIGHT-SIDED LOW BACK PAIN WITH RIGHT-SIDED SCIATICA: Primary | ICD-10-CM

## 2019-04-05 DIAGNOSIS — E66.9 OBESITY (BMI 30.0-34.9): ICD-10-CM

## 2019-04-05 DIAGNOSIS — R06.83 SNORING: ICD-10-CM

## 2019-04-05 RX ORDER — GABAPENTIN 300 MG/1
300 CAPSULE ORAL
Qty: 90 CAP | Refills: 0 | Status: SHIPPED | OUTPATIENT
Start: 2019-04-05 | End: 2019-05-03 | Stop reason: SDUPTHER

## 2019-04-05 RX ORDER — CHOLECALCIFEROL (VITAMIN D3) 125 MCG
440 CAPSULE ORAL AS NEEDED
COMMUNITY

## 2019-04-05 NOTE — PATIENT INSTRUCTIONS
Heat: apply heating pad 10-15 minutes at a time 3-4 times per day    Medications:  Ibuprofen/Advil: 200mg (1-3 tabs every 4-6 hours, take with food)        OR  Naproxen/Aleve: 220mg (1-2 tabs every 12 hours, take with food)    *Can not take these medications together. *You can take Tylenol 500mg (1 tabs every 6 hours, max dose of acetaminophen in 24 hrs is 3,000mg) with either Advil or Aleve    Stretching:  Start stretching/exercises (see below). Try to do this 1-2 times per day as tolerated. Low Back Pain: Exercises  Your Care Instructions  Here are some examples of typical rehabilitation exercises for your condition. Start each exercise slowly. Ease off the exercise if you start to have pain. Your doctor or physical therapist will tell you when you can start these exercises and which ones will work best for you. How to do the exercises  Press-up    1. Lie on your stomach, supporting your body with your forearms. 2. Press your elbows down into the floor to raise your upper back. As you do this, relax your stomach muscles and allow your back to arch without using your back muscles. As your press up, do not let your hips or pelvis come off the floor. 3. Hold for 15 to 30 seconds, then relax. 4. Repeat 2 to 4 times. Alternate arm and leg (bird dog) exercise    1. Start on the floor, on your hands and knees. 2. Tighten your belly muscles. 3. Raise one leg off the floor, and hold it straight out behind you. Be careful not to let your hip drop down, because that will twist your trunk. 4. Hold for about 6 seconds, then lower your leg and switch to the other leg. 5. Repeat 8 to 12 times on each leg. 6. Over time, work up to holding for 10 to 30 seconds each time. 7. If you feel stable and secure with your leg raised, try raising the opposite arm straight out in front of you at the same time. Knee-to-chest exercise    1.  Lie on your back with your knees bent and your feet flat on the floor.  2. Bring one knee to your chest, keeping the other foot flat on the floor (or keeping the other leg straight, whichever feels better on your lower back). 3. Keep your lower back pressed to the floor. Hold for at least 15 to 30 seconds. 4. Relax, and lower the knee to the starting position. 5. Repeat with the other leg. Repeat 2 to 4 times with each leg. 6. To get more stretch, put your other leg flat on the floor while pulling your knee to your chest.    Curl-ups    1. Lie on the floor on your back with your knees bent at a 90-degree angle. Your feet should be flat on the floor, about 12 inches from your buttocks. 2. Cross your arms over your chest. If this bothers your neck, try putting your hands behind your neck (not your head), with your elbows spread apart. 3. Slowly tighten your belly muscles and raise your shoulder blades off the floor. 4. Keep your head in line with your body, and do not press your chin to your chest.  5. Hold this position for 1 or 2 seconds, then slowly lower yourself back down to the floor. 6. Repeat 8 to 12 times. Pelvic tilt exercise    1. Lie on your back with your knees bent. 2. \"Brace\" your stomach. This means to tighten your muscles by pulling in and imagining your belly button moving toward your spine. You should feel like your back is pressing to the floor and your hips and pelvis are rocking back. 3. Hold for about 6 seconds while you breathe smoothly. 4. Repeat 8 to 12 times. Heel dig bridging    1. Lie on your back with both knees bent and your ankles bent so that only your heels are digging into the floor. Your knees should be bent about 90 degrees. 2. Then push your heels into the floor, squeeze your buttocks, and lift your hips off the floor until your shoulders, hips, and knees are all in a straight line.   3. Hold for about 6 seconds as you continue to breathe normally, and then slowly lower your hips back down to the floor and rest for up to 10 seconds. 4. Do 8 to 12 repetitions. Hamstring stretch in doorway    1. Lie on your back in a doorway, with one leg through the open door. 2. Slide your leg up the wall to straighten your knee. You should feel a gentle stretch down the back of your leg. 3. Hold the stretch for at least 15 to 30 seconds. Do not arch your back, point your toes, or bend either knee. Keep one heel touching the floor and the other heel touching the wall. 4. Repeat with your other leg. 5. Do 2 to 4 times for each leg. Hip flexor stretch    1. Kneel on the floor with one knee bent and one leg behind you. Place your forward knee over your foot. Keep your other knee touching the floor. 2. Slowly push your hips forward until you feel a stretch in the upper thigh of your rear leg. 3. Hold the stretch for at least 15 to 30 seconds. Repeat with your other leg. 4. Do 2 to 4 times on each side. Wall sit    1. Stand with your back 10 to 12 inches away from a wall. 2. Lean into the wall until your back is flat against it. 3. Slowly slide down until your knees are slightly bent, pressing your lower back into the wall. 4. Hold for about 6 seconds, then slide back up the wall. 5. Repeat 8 to 12 times. Follow-up care is a key part of your treatment and safety. Be sure to make and go to all appointments, and call your doctor if you are having problems. It's also a good idea to know your test results and keep a list of the medicines you take. Where can you learn more? Go to http://ginger-leon.info/. Enter D453 in the search box to learn more about \"Low Back Pain: Exercises. \"  Current as of: September 20, 2018  Content Version: 11.9  © 1791-6902 [employer]. Care instructions adapted under license by [employer] (which disclaims liability or warranty for this information).  If you have questions about a medical condition or this instruction, always ask your healthcare professional. [employer] disclaims any warranty or liability for your use of this information.

## 2019-04-05 NOTE — PROGRESS NOTES
Dayo Reveles is a 66 y.o. female who was seen in clinic today (4/5/2019) for an acute visit. Assessment & Plan:     Diagnoses and all orders for this visit:    1. Chronic right-sided low back pain with right-sided sciatica- this is a chronic problem but new to me, symptoms are: worsening, differential dx reviewed with the patient, favor radicular. Will treat with: heat, NSAIDs, rest, stretching, meds below. Reviewed triggers to avoid. Reviewed weight loss will help. Red flags were reviewed with the patient to RTC or notify me, expected time course for resolution reviewed. See AVS.   -     gabapentin (NEURONTIN) 300 mg capsule; Take 1 Cap by mouth three (3) times daily as needed for Pain. 2. Snoring- mentioned as she was leaving, new in the last few months, no apnea, no sinus congestion/pain, reviewed likely weight related. Will defer sleep study, reviewed needing to work on weight. If apnea or no changes in weight recommended she talk to PCP about sleep study. 3. Obesity (BMI 30.0-34.9)- chronic issue, worsening, I have reviewed/discussed the above normal BMI with the patient. I have recommended the following interventions: encourage exercise and lifestyle education regarding diet. She asked for refill of Qsymia, reviewed I will defer to PCP as this is a chronic medication and has not seen PCP in almost 1 yr. Encouraged her to schedule f/u with him. Follow-up and Dispositions    · Return if symptoms worsen or fail to improve. Subjective:   Leo Escudero was seen today for Back Pain    Pain Review:  She presents do to pain of her back that is secondary to no known injury and started 2 years ago. She reports it has been fluctuating since then. There will be good days and bad days. She was last seen by her PCP in May '18. She started to see Dr Delmi Dockery in Nov '18. She reports 2 days ago pain started to get worse.   She thinks it may have been done to wearing her gun belt which may have pushed on this area. She describes the pain as dull at baseline and sharp intermittently. It is constant but fluctuating in intensity. The pain radiates: down into her right leg (stops just above her knee and this is dull). Exacerbating factors identifiable by patient are bending forwards. She has tried the following: NSAID's. These have been: temporarily effective. MRI L-spine (4/25/18): IMPRESSION:  1. Multilevel degenerative disc disease and degenerative changes. Small left  foraminal disc protrusion at L4-L5 that appear to contact and displace the left  L4 nerve root and contributes to moderate left neuroforaminal narrowing. Please  see above report.     2. Edema noted in the right L4 and L5 pedicles presumably related to stress  reaction. Edema is also noted in the right L4-L5 facet joint related to  degenerative change, including paraspinal edema, which can cause significant  pain. Prior to Admission medications    Medication Sig Start Date End Date Taking? Authorizing Provider   naproxen sodium (ALEVE) 220 mg cap Take 440 mg by mouth as needed. Yes Provider, Historical   QSYMIA 15-92 mg CM24 TAKE ONE CAPSULE BY MOUTH EVERY DAY 1/27/19   Topher Kennedy MD          Allergies   Allergen Reactions    Demerol [Meperidine] Itching    Penicillin G Anaphylaxis           Review of Systems   Constitutional: Negative for chills and fever. Respiratory: Negative for cough and shortness of breath. Cardiovascular: Negative for chest pain and palpitations. Gastrointestinal: Negative for abdominal pain, constipation, diarrhea, nausea and vomiting. Musculoskeletal: Positive for back pain. Negative for joint pain and neck pain. Objective:   Physical Exam   Cardiovascular: Regular rhythm and normal heart sounds. No murmur heard. Pulmonary/Chest: Effort normal and breath sounds normal. She has no wheezes. She has no rales. Abdominal: She exhibits no mass.  There is no hepatosplenomegaly. There is no tenderness. Musculoskeletal:        Lumbar back: She exhibits normal range of motion, no tenderness, no bony tenderness, no pain and no spasm. Neurological:   Straight leg raise: negative  Strength is: 5/5 in lower extremities. Sensation is intact to light touch in lower extremities          Visit Vitals  /72   Pulse 85   Temp 98.2 °F (36.8 °C) (Oral)   Resp 18   Ht 5' 4\" (1.626 m)   Wt 176 lb (79.8 kg)   SpO2 96%   BMI 30.21 kg/m²         Disclaimer:  Advised her to call back or return to office if symptoms worsen/change/persist.  Discussed expected course/resolution/complications of diagnosis in detail with patient. Medication risks/benefits/costs/interactions/alternatives discussed with patient. She was given an after visit summary which includes diagnoses, current medications, & vitals. She expressed understanding with the diagnosis and plan. Aspects of this note may have been generated using voice recognition software. Despite editing, there may be some syntax errors.        Carlos Ayala MD

## 2019-04-05 NOTE — PROGRESS NOTES
Back pain, into right leg, long term but worsening, having leg cramps. requesting refills on meds, taking up to 6 aleve a day. States having increased snoring.  Asking about referral.

## 2019-04-08 ENCOUNTER — OFFICE VISIT (OUTPATIENT)
Dept: INTERNAL MEDICINE CLINIC | Age: 59
End: 2019-04-08

## 2019-04-08 VITALS
DIASTOLIC BLOOD PRESSURE: 80 MMHG | BODY MASS INDEX: 30.9 KG/M2 | OXYGEN SATURATION: 98 % | RESPIRATION RATE: 18 BRPM | SYSTOLIC BLOOD PRESSURE: 120 MMHG | HEIGHT: 64 IN | TEMPERATURE: 97.9 F | WEIGHT: 181 LBS | HEART RATE: 62 BPM

## 2019-04-08 DIAGNOSIS — R63.8 WEIGHT DISORDER: ICD-10-CM

## 2019-04-08 DIAGNOSIS — Z00.00 ROUTINE GENERAL MEDICAL EXAMINATION AT A HEALTH CARE FACILITY: ICD-10-CM

## 2019-04-08 DIAGNOSIS — M47.816 SPONDYLOSIS OF LUMBAR REGION WITHOUT MYELOPATHY OR RADICULOPATHY: ICD-10-CM

## 2019-04-08 DIAGNOSIS — Z12.31 SCREENING MAMMOGRAM, ENCOUNTER FOR: ICD-10-CM

## 2019-04-08 DIAGNOSIS — R06.83 SNORING: Primary | ICD-10-CM

## 2019-04-08 RX ORDER — HYDROCORTISONE 1 %
CREAM (GRAM) TOPICAL 2 TIMES DAILY
Qty: 30 G | Refills: 0 | COMMUNITY
Start: 2019-04-08 | End: 2019-08-14 | Stop reason: ALTCHOICE

## 2019-04-08 RX ORDER — CHLORPHENIRAMINE MALEATE 4 MG
TABLET ORAL 2 TIMES DAILY
Qty: 15 G | Refills: 0 | COMMUNITY
Start: 2019-04-08 | End: 2019-08-14 | Stop reason: ALTCHOICE

## 2019-04-08 NOTE — LETTER
NOTIFICATION RETURN TO WORK / SCHOOL 
 
4/8/2019 9:13 AM 
 
Ms. HAMMOND [facility] 
[address] [facility] [address] [facility] [address] To Whom It May Concern: 
 
Kat Flannery is currently under the care of [facility]. She will return to work/school on: 04/08/2019 If there are questions or concerns please have the patient contact our office. Sincerely, Nathen Wesley MD

## 2019-04-08 NOTE — PROGRESS NOTES
HPI:  Bryan Cantu is a 66y.o. year old female who is here for a routine visit:    Presents for follow-up visit. She is gained about 20 pounds over the last few months. She had stopped taking her weight loss drug and is not following a diet. She has not been able to exercise due to chronic lower back pain. He has seen 2 different pain doctors and had some injections which have been of minimal benefit. Review of her MRI does show significant degenerative joint disease with some areas of edema has also developed a dry itchy rash across her neck. She has some itchy rash across her groin as well. Past Medical History:   Diagnosis Date    Arthritis     Other specified dorsopathies, sacral and sacrococcygeal region (CODE)        Past Surgical History:   Procedure Laterality Date    HX COLONOSCOPY  1/15/15    Dr. Natalia Tapia - 8 yr f/u    HX HYSTERECTOMY  2003    HX [address] PROCEDURES  2003    right shoulder    HX ORTHOPAEDIC  12/2009    bilateral knee replacement       Prior to Admission medications    Medication Sig Start Date End Date Taking? Authorizing Provider   varicella-zoster recombinant, PF, (SHINGRIX, PF,) 50 mcg/0.5 mL susr injection 0.5 mL by IntraMUSCular route once for 1 dose. 4/8/19 4/8/19 Yes Pat Baez MD   phentermine-topiramate [facility]) 15-92 mg CM24 TAKE ONE CAPSULE BY MOUTH EVERY DAY 4/8/19  Yes Pat Baez MD   clotrimazole (LOTRIMIN) 1 % topical cream Apply  to affected area two (2) times a day. On groin as needed 4/8/19  Yes Pat Baez MD   hydrocortisone (CORTAID) 1 % topical cream Apply  to affected area two (2) times a day. For itch 4/8/19  Yes Pat Baez MD   gabapentin (NEURONTIN) 300 mg capsule Take 1 Cap by mouth three (3) times daily as needed for Pain. 4/5/19  Yes Tyler Sarabia MD   naproxen sodium (ALEVE) 220 mg cap Take 440 mg by mouth as needed.     Provider, Historical       Social History     Socioeconomic History    Marital status:      Spouse name: Not on file    Number of children: Not on file    Years of education: Not on file    Highest education level: Not on file   Occupational History    Not on file   Social Needs    Financial resource strain: Not on file    Food insecurity:     Worry: Not on file     Inability: Not on file    Transportation needs:     Medical: Not on file     Non-medical: Not on file   Tobacco Use    Smoking status: Former Smoker     Years: 2.00     Last attempt to quit: 2000     Years since quittin.2    Smokeless tobacco: Never Used   Substance and Sexual Activity    Alcohol use: Not Currently     Comment: occassionally    Drug use: No    Sexual activity: Yes     Partners: Male     Birth control/protection: Surgical   Lifestyle    Physical activity:     Days per week: Not on file     Minutes per session: Not on file    Stress: Not on file   Relationships    Social connections:     Talks on phone: Not on file     Gets together: Not on file     Attends Scientology service: Not on file     Active member of club or organization: Not on file     Attends meetings of clubs or organizations: Not on file     Relationship status: Not on file    Intimate partner violence:     Fear of current or ex partner: Not on file     Emotionally abused: Not on file     Physically abused: Not on file     Forced sexual activity: Not on file   Other Topics Concern    Not on file   Social History Narrative    Not on file          ROS  Per HPI    Visit Vitals  /80 (BP 1 Location: Left arm, BP Patient Position: Sitting)   Pulse 62   Temp 97.9 °F (36.6 °C) (Oral)   Resp 18   Ht 5' 4\" (1.626 m)   Wt 181 lb (82.1 kg)   SpO2 98%   BMI 31.07 kg/m²         Physical Exam   Physical Examination: General appearance - alert, well appearing, and in no distress  Chest - clear to auscultation, no wheezes, rales or rhonchi, symmetric air entry  Heart - normal rate, regular rhythm, normal S1, S2, no murmurs, rubs, clicks or gallops  Abdomen - soft, nontender, nondistended, no masses or organomegaly  Back exam - limited range of motion, pain with motion noted during exam, tenderness noted along the lower lumbar spine. , sacroiliac joints and sciatic notches nontender  Neurological - alert, oriented, normal speech, no focal findings or movement disorder noted  Extremities - peripheral pulses normal, no pedal edema, no clubbing or cyanosis  Some areas of erythema in the groin that appear to be intertriginous. She does have some dry scaly areas on her upper chest and both antecubital fossa's. Assessment/Plan:  Diagnoses and all orders for this visit:    1. Snoring-reported by her  and with her weight gain need to rule out sleep apnea. -     REFERRAL TO SLEEP STUDIES    2. Weight disorder-refill Q Donna and continue to work on diet and exercise.  -     REFERRAL TO SLEEP STUDIES  -     phentermine-topiramate (QSYMIA) 15-92 mg CM24; TAKE ONE CAPSULE BY MOUTH EVERY DAY    3. Routine general medical examination at a health care facility  -     CBC WITH AUTOMATED DIFF  -     METABOLIC PANEL, COMPREHENSIVE  -     LIPID PANEL  -     TSH RFX ON ABNORMAL TO FREE T4  -     varicella-zoster recombinant, PF, (SHINGRIX, PF,) 50 mcg/0.5 mL susr injection; 0.5 mL by IntraMUSCular route once for 1 dose. 4. Screening mammogram, encounter for  -     [facility] 3D CIRILO W MAMMO BI SCREENING INCL CAD; Future    5. Spondylosis of lumbar region without myelopathy or radiculopathy-referral back to the pain doctors for consideration of facet joint injections. 6.  Eczema-we will treat with chordee. 7. Intertrigo-we will treat with over-the-counter Lotrimin. Advised her to call back or return to office if symptoms worsen/change/persist.  Discussed expected course/resolution/complications of diagnosis in detail with patient. Medication risks/benefits/costs/interactions/alternatives discussed with patient.   She was given an after visit summary which includes diagnoses, current medications, & vitals. She expressed understanding with the diagnosis and plan.

## 2019-04-08 NOTE — LETTER
NOTIFICATION RETURN TO WORK / SCHOOL 
 
4/8/2019 9:15 AM 
 
Ms. HAMMOND [facility] 
[address] [facility] [address] [place] [id number] To Whom It May Concern: 
 
Yesenia Baugh is currently under the care of [facility]. She will return to work/school on: 04/09/2019 If there are questions or concerns please have the patient contact our office. Sincerely, Karla Clay MD

## 2019-04-10 LAB
ALBUMIN SERPL-MCNC: 4.1 G/DL (ref 3.5–5.5)
ALBUMIN/GLOB SERPL: 1.6 {RATIO} (ref 1.2–2.2)
ALP SERPL-CCNC: 72 IU/L (ref 39–117)
ALT SERPL-CCNC: 21 IU/L (ref 0–32)
AST SERPL-CCNC: 22 IU/L (ref 0–40)
BASOPHILS # BLD AUTO: 0 X10E3/UL (ref 0–0.2)
BASOPHILS NFR BLD AUTO: 0 %
BILIRUB SERPL-MCNC: 0.7 MG/DL (ref 0–1.2)
BUN SERPL-MCNC: 15 MG/DL (ref 6–24)
BUN/CREAT SERPL: 18 (ref 9–23)
CALCIUM SERPL-MCNC: 10 MG/DL (ref 8.7–10.2)
CHLORIDE SERPL-SCNC: 105 MMOL/L (ref 96–106)
CHOLEST SERPL-MCNC: 204 MG/DL (ref 100–199)
CO2 SERPL-SCNC: 22 MMOL/L (ref 20–29)
CREAT SERPL-MCNC: 0.84 MG/DL (ref 0.57–1)
EOSINOPHIL # BLD AUTO: 0.1 X10E3/UL (ref 0–0.4)
EOSINOPHIL NFR BLD AUTO: 2 %
ERYTHROCYTE [DISTWIDTH] IN BLOOD BY AUTOMATED COUNT: 17.6 % (ref 12.3–15.4)
GLOBULIN SER CALC-MCNC: 2.6 G/DL (ref 1.5–4.5)
GLUCOSE SERPL-MCNC: 95 MG/DL (ref 65–99)
HCT VFR BLD AUTO: 36.2 % (ref 34–46.6)
HDLC SERPL-MCNC: 88 MG/DL
HGB BLD-MCNC: 11.1 G/DL (ref 11.1–15.9)
IMM GRANULOCYTES # BLD AUTO: 0 X10E3/UL (ref 0–0.1)
IMM GRANULOCYTES NFR BLD AUTO: 0 %
LDLC SERPL CALC-MCNC: 103 MG/DL (ref 0–99)
LYMPHOCYTES # BLD AUTO: 2.1 X10E3/UL (ref 0.7–3.1)
LYMPHOCYTES NFR BLD AUTO: 42 %
MCH RBC QN AUTO: 22.5 PG (ref 26.6–33)
MCHC RBC AUTO-ENTMCNC: 30.7 G/DL (ref 31.5–35.7)
MCV RBC AUTO: 73 FL (ref 79–97)
MONOCYTES # BLD AUTO: 0.3 X10E3/UL (ref 0.1–0.9)
MONOCYTES NFR BLD AUTO: 7 %
NEUTROPHILS # BLD AUTO: 2.4 X10E3/UL (ref 1.4–7)
NEUTROPHILS NFR BLD AUTO: 49 %
PLATELET # BLD AUTO: 264 X10E3/UL (ref 150–379)
POTASSIUM SERPL-SCNC: 4.1 MMOL/L (ref 3.5–5.2)
PROT SERPL-MCNC: 6.7 G/DL (ref 6–8.5)
RBC # BLD AUTO: 4.94 X10E6/UL (ref 3.77–5.28)
SODIUM SERPL-SCNC: 141 MMOL/L (ref 134–144)
TRIGL SERPL-MCNC: 63 MG/DL (ref 0–149)
TSH SERPL DL<=0.005 MIU/L-ACNC: 1.4 UIU/ML (ref 0.45–4.5)
VLDLC SERPL CALC-MCNC: 13 MG/DL (ref 5–40)
WBC # BLD AUTO: 5 X10E3/UL (ref 3.4–10.8)

## 2019-04-11 ENCOUNTER — HOSPITAL ENCOUNTER (OUTPATIENT)
Dept: MAMMOGRAPHY | Age: 59
Discharge: HOME | End: 2019-04-11
Attending: INTERNAL MEDICINE
Payer: COMMERCIAL

## 2019-04-11 DIAGNOSIS — Z12.31 SCREENING MAMMOGRAM, ENCOUNTER FOR: ICD-10-CM

## 2019-04-11 PROCEDURE — 77063 BREAST TOMOSYNTHESIS BI: CPT

## 2019-04-16 ENCOUNTER — TELEPHONE (OUTPATIENT)
Dept: INTERNAL MEDICINE CLINIC | Age: 59
End: 2019-04-16

## 2019-04-18 NOTE — TELEPHONE ENCOUNTER
LM on personal vm that we did receive her paperwork, but we need to know how many days she plans to be out. Told her that she can leave information with  and we will get paperwork filled out.

## 2019-04-24 ENCOUNTER — TELEPHONE (OUTPATIENT)
Dept: INTERNAL MEDICINE CLINIC | Age: 59
End: 2019-04-24

## 2019-04-24 NOTE — TELEPHONE ENCOUNTER
Patient called and said that since her back is hurting so bad, to please put \"intermittent from now until the end of July\" on the FMLA form -- will re-do the paperwork if she needs to at that time. Please fax to the number on the FMLA form.

## 2019-05-03 DIAGNOSIS — M54.41 CHRONIC RIGHT-SIDED LOW BACK PAIN WITH RIGHT-SIDED SCIATICA: ICD-10-CM

## 2019-05-03 DIAGNOSIS — G89.29 CHRONIC RIGHT-SIDED LOW BACK PAIN WITH RIGHT-SIDED SCIATICA: ICD-10-CM

## 2019-05-03 RX ORDER — GABAPENTIN 300 MG/1
300 CAPSULE ORAL
Qty: 90 CAP | Refills: 1 | Status: SHIPPED | OUTPATIENT
Start: 2019-05-03 | End: 2019-08-14 | Stop reason: SDUPTHER

## 2019-05-03 NOTE — TELEPHONE ENCOUNTER
Orders Placed This Encounter    gabapentin (NEURONTIN) 300 mg capsule     Sig: Take 1 Cap by mouth three (3) times daily as needed for Pain. Dispense:  90 Cap     Refill:  1     The above orders were approved via [employer] per Dr. Claudell Hammans, III.

## 2019-08-14 ENCOUNTER — OFFICE VISIT (OUTPATIENT)
Dept: INTERNAL MEDICINE CLINIC | Age: 59
End: 2019-08-14

## 2019-08-14 VITALS
HEART RATE: 89 BPM | BODY MASS INDEX: 29.19 KG/M2 | SYSTOLIC BLOOD PRESSURE: 117 MMHG | HEIGHT: 64 IN | TEMPERATURE: 97.7 F | WEIGHT: 171 LBS | RESPIRATION RATE: 12 BRPM | OXYGEN SATURATION: 99 % | DIASTOLIC BLOOD PRESSURE: 75 MMHG

## 2019-08-14 DIAGNOSIS — F43.21 GRIEF: ICD-10-CM

## 2019-08-14 DIAGNOSIS — R63.8 WEIGHT DISORDER: ICD-10-CM

## 2019-08-14 DIAGNOSIS — M54.41 CHRONIC RIGHT-SIDED LOW BACK PAIN WITH RIGHT-SIDED SCIATICA: ICD-10-CM

## 2019-08-14 DIAGNOSIS — G89.29 CHRONIC RIGHT-SIDED LOW BACK PAIN WITH RIGHT-SIDED SCIATICA: ICD-10-CM

## 2019-08-14 DIAGNOSIS — S16.1XXS CERVICAL STRAIN, ACUTE, SEQUELA: Primary | ICD-10-CM

## 2019-08-14 RX ORDER — GABAPENTIN 300 MG/1
300 CAPSULE ORAL
Qty: 90 CAP | Refills: 1 | Status: SHIPPED | OUTPATIENT
Start: 2019-08-14 | End: 2019-11-06 | Stop reason: SDUPTHER

## 2019-08-14 RX ORDER — TIZANIDINE 4 MG/1
4 TABLET ORAL
Qty: 30 TAB | Refills: 1 | Status: SHIPPED | OUTPATIENT
Start: 2019-08-14 | End: 2019-10-02 | Stop reason: SDUPTHER
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
## 2019-08-15 ENCOUNTER — OFFICE VISIT (OUTPATIENT)
Dept: INTERNAL MEDICINE CLINIC | Age: 59
End: 2019-08-15

## 2019-08-15 VITALS
DIASTOLIC BLOOD PRESSURE: 77 MMHG | TEMPERATURE: 97.6 F | HEART RATE: 88 BPM | OXYGEN SATURATION: 100 % | RESPIRATION RATE: 12 BRPM | HEIGHT: 64 IN | BODY MASS INDEX: 29.19 KG/M2 | WEIGHT: 171 LBS | SYSTOLIC BLOOD PRESSURE: 121 MMHG

## 2019-08-15 DIAGNOSIS — F43.21 SITUATIONAL DEPRESSION: Primary | ICD-10-CM

## 2019-08-15 DIAGNOSIS — F33.1 DEPRESSION, MAJOR, RECURRENT, MODERATE (HCC): ICD-10-CM

## 2019-08-15 RX ORDER — DULOXETIN HYDROCHLORIDE 30 MG/1
30 CAPSULE, DELAYED RELEASE ORAL DAILY
Qty: 30 CAP | Refills: 1 | Status: SHIPPED | OUTPATIENT
Start: 2019-08-15 | End: 2019-10-02 | Stop reason: SDUPTHER
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
## 2019-08-15 NOTE — LETTER
NOTIFICATION RETURN TO WORK / SCHOOL 
 
8/15/2019 9:31 AM 
 
Ms. Blu Rodríguez [address] 
[address] [id number] To Whom It May Concern: 
 
Blu Rodríguez [id number] is currently under the care of [facility]. She will return to work/school on:  08/30/2019. If there are questions or concerns please have the patient contact our office. Sincerely, Sumaya Regan MD

## 2019-08-21 ENCOUNTER — TELEPHONE (OUTPATIENT)
Dept: INTERNAL MEDICINE CLINIC | Age: 59
End: 2019-08-21

## 2019-08-21 NOTE — TELEPHONE ENCOUNTER
Patient was just started on Cymbalta -- after being on it for several days, she would prefer to go on Xanax instead. Has previously taken Xanax prior to dental appts, but would like to take it full time now. Asked if the switch could be done before Dr. Misty Samll on vacation. Please call to let her know.

## 2019-08-30 NOTE — TELEPHONE ENCOUNTER
Spoke with pt. She states she is doing very well on the Cymbalta. She actually said Mary Malik was enjoying it\". No further needs at this time and she has f/u with [facility] on 9/12.

## 2019-09-12 ENCOUNTER — OFFICE VISIT (OUTPATIENT)
Dept: INTERNAL MEDICINE CLINIC | Age: 59
End: 2019-09-12

## 2019-09-12 VITALS
SYSTOLIC BLOOD PRESSURE: 123 MMHG | TEMPERATURE: 97.6 F | OXYGEN SATURATION: 99 % | HEIGHT: 64 IN | DIASTOLIC BLOOD PRESSURE: 83 MMHG | HEART RATE: 93 BPM | BODY MASS INDEX: 29.02 KG/M2 | WEIGHT: 170 LBS | RESPIRATION RATE: 16 BRPM

## 2019-09-12 DIAGNOSIS — E66.3 OVERWEIGHT: ICD-10-CM

## 2019-09-12 DIAGNOSIS — F33.1 DEPRESSION, MAJOR, RECURRENT, MODERATE (HCC): Primary | ICD-10-CM

## 2019-09-12 DIAGNOSIS — M54.2 NECK PAIN: ICD-10-CM

## 2019-09-12 NOTE — PROGRESS NOTES
HPI:  Marnie Wells is a 66y.o. year old female who is here for a routine visit:    Presents for follow-up visit. Her depression is much improved on the Cymbalta. She is tolerating the dose very well. Her moods have much better. She has moved her  out and that helped a lot as well. She filed for eviction as well as divorce. She has had some improvement in the neck and shoulder discomfort. She continues to have aches and pains in the elbows as well as in the shoulder joints. She wanted to go back on medications for weight loss but I feel it was a prudent to remain off until she is stable with the Cymbalta. Past Medical History:   Diagnosis Date    Arthritis     Depression, major, recurrent, moderate ([address] 8/15/2019    Other specified dorsopathies, sacral and sacrococcygeal region (CODE)        Past Surgical History:   Procedure Laterality Date    HX COLONOSCOPY  1/15/15    Dr. Violetta Westbrook - 8 yr f/u    HX HYSTERECTOMY  2003    HX [address] PROCEDURES  2003    right shoulder    HX ORTHOPAEDIC  12/2009    bilateral knee replacement       Prior to Admission medications    Medication Sig Start Date End Date Taking? Authorizing Provider   DULoxetine (CYMBALTA) 30 mg capsule Take 1 Cap by mouth daily. 8/15/19  Yes Livia Lane MD   tiZANidine (ZANAFLEX) 4 mg tablet Take 1 Tab by mouth nightly as needed (neck pain). 8/14/19  Yes Livia Lane MD   gabapentin (NEURONTIN) 300 mg capsule Take 1 Cap by mouth three (3) times daily as needed for Pain. 8/14/19  Yes Livia Lane MD   naproxen sodium (ALEVE) 220 mg cap Take 440 mg by mouth as needed.    Yes Provider, Historical       Social History     Socioeconomic History    Marital status:      Spouse name: Not on file    Number of children: Not on file    Years of education: Not on file    Highest education level: Not on file   Occupational History    Not on file   Social Needs    Financial resource strain: Not on file   [address] [place] [address] Food insecurity:     Worry: Not on file     Inability: Not on file    Transportation needs:     Medical: Not on file     Non-medical: Not on file   Tobacco Use    Smoking status: Former Smoker     Years: 2.00     Last attempt to quit: 2000     Years since quittin.7    Smokeless tobacco: Never Used   Substance and Sexual Activity    Alcohol use: Yes     Alcohol/week: 1.0 standard drinks     Types: 1 Standard drinks or equivalent per week    Drug use: No    Sexual activity: Yes     Partners: Male     Birth control/protection: Surgical   Lifestyle    Physical activity:     Days per week: Not on file     Minutes per session: Not on file    Stress: Not on file   Relationships    Social connections:     Talks on phone: Not on file     Gets together: Not on file     Attends Oriental orthodox service: Not on file     Active member of club or organization: Not on file     Attends meetings of clubs or organizations: Not on file     Relationship status: Not on file    Intimate partner violence:     Fear of current or ex partner: Not on file     Emotionally abused: Not on file     Physically abused: Not on file     Forced sexual activity: Not on file   Other Topics Concern    Not on file   Social History Narrative    Not on file          ROS  Per HPI    Visit Vitals  /83   Pulse 93   Temp 97.6 °F (36.4 °C) (Oral)   Resp 16   Ht 5' 4\" (1.626 m)   Wt 170 lb (77.1 kg)   SpO2 99%   BMI 29.18 kg/m²         Physical Exam   Physical Examination: General appearance - alert, well appearing, and in no distress  Chest - clear to auscultation, no wheezes, rales or rhonchi, symmetric air entry  Heart - normal rate, regular rhythm, normal S1, S2, no murmurs, rubs, clicks or gallops  Crepitus in both shoulders. Some mild tenderness across the lateral epicondyles of both elbows. Some mild tenderness across the trapezius bilaterally. Assessment/Plan:  Diagnoses and all orders for this visit:    1.  Depression, major, recurrent, moderate (HCC)-much improved and stable on low-dose Cymbalta. We will continue this for now. 2. Neck pain-stretching exercises, ice, and physical therapy if needed. 3. Gray Odom will weigh again here in 6 weeks. If her weight continues to increase reconsider medications for weight loss. Follow-up and Dispositions    · Return in about 6 weeks (around 10/24/2019). Advised her to call back or return to office if symptoms worsen/change/persist.  Discussed expected course/resolution/complications of diagnosis in detail with patient. Medication risks/benefits/costs/interactions/alternatives discussed with patient. She was given an after visit summary which includes diagnoses, current medications, & vitals. She expressed understanding with the diagnosis and plan.

## 2019-10-02 DIAGNOSIS — S16.1XXS CERVICAL STRAIN, ACUTE, SEQUELA: ICD-10-CM

## 2019-10-03 RX ORDER — DULOXETIN HYDROCHLORIDE 30 MG/1
CAPSULE, DELAYED RELEASE ORAL
Qty: 30 CAP | Refills: 0 | Status: SHIPPED | OUTPATIENT
Start: 2019-10-03 | End: 2019-11-06 | Stop reason: SDUPTHER

## 2019-10-03 RX ORDER — TIZANIDINE 4 MG/1
TABLET ORAL
Qty: 30 TAB | Refills: 0 | Status: SHIPPED | OUTPATIENT
Start: 2019-10-03 | End: 2019-11-06 | Stop reason: SDUPTHER

## 2019-11-06 DIAGNOSIS — G89.29 CHRONIC RIGHT-SIDED LOW BACK PAIN WITH RIGHT-SIDED SCIATICA: ICD-10-CM

## 2019-11-06 DIAGNOSIS — M54.41 CHRONIC RIGHT-SIDED LOW BACK PAIN WITH RIGHT-SIDED SCIATICA: ICD-10-CM

## 2019-11-06 DIAGNOSIS — S16.1XXS CERVICAL STRAIN, ACUTE, SEQUELA: ICD-10-CM

## 2019-11-06 RX ORDER — TIZANIDINE 4 MG/1
TABLET ORAL
Qty: 30 TAB | Refills: 0 | Status: SHIPPED | OUTPATIENT
Start: 2019-11-06 | End: 2019-12-20 | Stop reason: SDUPTHER

## 2019-11-06 RX ORDER — DULOXETIN HYDROCHLORIDE 30 MG/1
CAPSULE, DELAYED RELEASE ORAL
Qty: 30 CAP | Refills: 0 | Status: SHIPPED | OUTPATIENT
Start: 2019-11-06 | End: 2019-11-11 | Stop reason: ALTCHOICE

## 2019-11-06 RX ORDER — GABAPENTIN 300 MG/1
CAPSULE ORAL
Qty: 90 CAP | Refills: 0 | Status: SHIPPED | OUTPATIENT
Start: 2019-11-06 | End: 2019-12-20 | Stop reason: SDUPTHER

## 2019-11-11 ENCOUNTER — CLINICAL SUPPORT (OUTPATIENT)
Dept: INTERNAL MEDICINE CLINIC | Age: 59
End: 2019-11-11

## 2019-11-11 VITALS — WEIGHT: 182 LBS | HEIGHT: 64 IN | BODY MASS INDEX: 31.07 KG/M2

## 2019-11-11 DIAGNOSIS — R63.8 WEIGHT DISORDER: ICD-10-CM

## 2019-12-20 DIAGNOSIS — G89.29 CHRONIC RIGHT-SIDED LOW BACK PAIN WITH RIGHT-SIDED SCIATICA: ICD-10-CM

## 2019-12-20 DIAGNOSIS — S16.1XXS CERVICAL STRAIN, ACUTE, SEQUELA: ICD-10-CM

## 2019-12-20 DIAGNOSIS — M54.41 CHRONIC RIGHT-SIDED LOW BACK PAIN WITH RIGHT-SIDED SCIATICA: ICD-10-CM

## 2019-12-20 RX ORDER — GABAPENTIN 300 MG/1
CAPSULE ORAL
Qty: 90 CAP | Refills: 0 | Status: SHIPPED | OUTPATIENT
Start: 2019-12-20 | End: 2020-03-05

## 2019-12-20 RX ORDER — TIZANIDINE 4 MG/1
TABLET ORAL
Qty: 30 TAB | Refills: 0 | Status: SHIPPED | OUTPATIENT
Start: 2019-12-20 | End: 2020-01-05

## 2020-01-05 DIAGNOSIS — S16.1XXS CERVICAL STRAIN, ACUTE, SEQUELA: ICD-10-CM

## 2020-01-05 RX ORDER — TIZANIDINE 4 MG/1
TABLET ORAL
Qty: 30 TAB | Refills: 0 | Status: SHIPPED | OUTPATIENT
Start: 2020-01-05 | End: 2020-04-05

## 2020-03-05 DIAGNOSIS — M54.41 CHRONIC RIGHT-SIDED LOW BACK PAIN WITH RIGHT-SIDED SCIATICA: ICD-10-CM

## 2020-03-05 DIAGNOSIS — G89.29 CHRONIC RIGHT-SIDED LOW BACK PAIN WITH RIGHT-SIDED SCIATICA: ICD-10-CM

## 2020-03-05 RX ORDER — GABAPENTIN 300 MG/1
CAPSULE ORAL
Qty: 90 CAP | Refills: 1 | Status: SHIPPED | OUTPATIENT
Start: 2020-03-05 | End: 2020-06-05 | Stop reason: SDUPTHER

## 2020-03-23 DIAGNOSIS — R63.8 WEIGHT DISORDER: ICD-10-CM

## 2020-03-23 NOTE — TELEPHONE ENCOUNTER
[phone] or [phone]    Patient stopped using this medication while she was going to [facility]. She is not going back there because of what is going on, but needs an appetitie suppressant. 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 Please let her know if you can't refill this.

## 2020-03-24 RX ORDER — PHENTERMINE AND TOPIRAMATE 15; 92 MG/1; MG/1
CAPSULE, EXTENDED RELEASE ORAL
Qty: 30 CAP | Refills: 2 | Status: SHIPPED | OUTPATIENT
Start: 2020-03-24 | End: 2020-10-08

## 2020-04-04 DIAGNOSIS — S16.1XXS CERVICAL STRAIN, ACUTE, SEQUELA: ICD-10-CM

## 2020-04-05 RX ORDER — TIZANIDINE 4 MG/1
TABLET ORAL
Qty: 30 TAB | Refills: 0 | Status: SHIPPED | OUTPATIENT
Start: 2020-04-05 | End: 2020-05-04

## 2020-05-04 DIAGNOSIS — S16.1XXS CERVICAL STRAIN, ACUTE, SEQUELA: ICD-10-CM

## 2020-05-04 RX ORDER — TIZANIDINE 4 MG/1
TABLET ORAL
Qty: 30 TAB | Refills: 0 | Status: SHIPPED | OUTPATIENT
Start: 2020-05-04 | End: 2020-06-03

## 2020-06-03 DIAGNOSIS — S16.1XXS CERVICAL STRAIN, ACUTE, SEQUELA: ICD-10-CM

## 2020-06-03 RX ORDER — TIZANIDINE 4 MG/1
TABLET ORAL
Qty: 30 TAB | Refills: 0 | Status: SHIPPED | OUTPATIENT
Start: 2020-06-03 | End: 2020-06-05 | Stop reason: SDUPTHER

## 2020-06-05 DIAGNOSIS — S16.1XXS CERVICAL STRAIN, ACUTE, SEQUELA: ICD-10-CM

## 2020-06-05 DIAGNOSIS — G89.29 CHRONIC RIGHT-SIDED LOW BACK PAIN WITH RIGHT-SIDED SCIATICA: ICD-10-CM

## 2020-06-05 DIAGNOSIS — M54.41 CHRONIC RIGHT-SIDED LOW BACK PAIN WITH RIGHT-SIDED SCIATICA: ICD-10-CM

## 2020-06-05 RX ORDER — GABAPENTIN 300 MG/1
CAPSULE ORAL
Qty: 90 CAP | Refills: 1 | Status: SHIPPED | OUTPATIENT
Start: 2020-06-05 | End: 2022-03-30 | Stop reason: ALTCHOICE

## 2020-06-05 RX ORDER — TIZANIDINE 4 MG/1
4 TABLET ORAL
Qty: 30 TAB | Refills: 0 | Status: SHIPPED | OUTPATIENT
Start: 2020-06-05 | End: 2021-05-19 | Stop reason: ALTCHOICE

## 2020-06-05 NOTE — TELEPHONE ENCOUNTER
Requested Prescriptions     Pending Prescriptions Disp Refills    gabapentin (NEURONTIN) 300 mg capsule 90 Cap 1     Sig: TAKE 1 CAPSULE BY MOUTH THREE TIMES DAILY AS NEEDED FOR PAIN    tiZANidine (ZANAFLEX) 4 mg tablet 30 Tab 0       [address], [place] - [address]  [phone]

## 2020-06-10 ENCOUNTER — TELEPHONE (OUTPATIENT)
Dept: INTERNAL MEDICINE CLINIC | Age: 60
End: 2020-06-10

## 2020-06-12 ENCOUNTER — OFFICE VISIT (OUTPATIENT)
Dept: INTERNAL MEDICINE CLINIC | Age: 60
End: 2020-06-12

## 2020-06-12 VITALS
TEMPERATURE: 98 F | RESPIRATION RATE: 14 BRPM | HEART RATE: 92 BPM | HEIGHT: 64 IN | WEIGHT: 172 LBS | OXYGEN SATURATION: 100 % | SYSTOLIC BLOOD PRESSURE: 125 MMHG | BODY MASS INDEX: 29.37 KG/M2 | DIASTOLIC BLOOD PRESSURE: 85 MMHG

## 2020-06-12 DIAGNOSIS — M25.531 WRIST PAIN, ACUTE, RIGHT: Primary | ICD-10-CM

## 2020-06-12 DIAGNOSIS — M25.512 ACUTE PAIN OF LEFT SHOULDER: ICD-10-CM

## 2020-06-12 NOTE — PROGRESS NOTES
HPI:  Maryjane Moulton is a 64y.o. year old female who is here for right wrist pain since May 18. 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 She had pain that developed next 24 hours. She was at occupational health and they diagnosed a sprain. She was told to ice it and use heat and has done so. She is wrapped at home as well. She continues to have pain in the wrist as well as numbness and tingling in the middle fingers. Denies any significant swelling. No discomfort in the left wrist.  She does have some ongoing issues with left shoulder discomfort and some popping there. She has had previous surgery on the right shoulder and continues to have some discomfort there as well. Past Medical History:   Diagnosis Date    Arthritis     Depression, major, recurrent, moderate ([facility] [address] 8/15/2019    Other specified dorsopathies, sacral and sacrococcygeal region (CODE)        Past Surgical History:   Procedure Laterality Date    HX COLONOSCOPY  1/15/15    Dr. Mario Martínez - 8 yr f/u    HX HYSTERECTOMY  2003    HX [address] PROCEDURES  2003    right shoulder    HX ORTHOPAEDIC  12/2009    bilateral knee replacement       Prior to Admission medications    Medication Sig Start Date End Date Taking? Authorizing Provider   gabapentin (NEURONTIN) 300 mg capsule TAKE 1 CAPSULE BY MOUTH THREE TIMES DAILY AS NEEDED FOR PAIN 6/5/20  Yes Chaparrita Keen III, MD   tiZANidine (ZANAFLEX) 4 mg tablet Take 1 Tab by mouth nightly as needed for Muscle Spasm(s). 6/5/20  Yes Usha Willis MD   phentermine-topiramate (Qsymia) 15-92 mg CM24 TAKE ONE CAPSULE BY MOUTH EVERY DAY 3/24/20  Yes Chaparrita Keen III, MD   naproxen sodium (ALEVE) 220 mg cap Take 440 mg by mouth as needed.     Provider, Historical       Social History     Socioeconomic History    Marital status:      Spouse name: Not on file    Number of children: Not on file    Years of education: Not on file    Highest education level: Not on file   Occupational History    Not on file   Social Needs    Financial resource strain: Not on file    Food insecurity     Worry: Not on file     Inability: Not on file    Transportation needs     Medical: Not on file     Non-medical: Not on file   Tobacco Use    Smoking status: Former Smoker     Years: 2.00     Last attempt to quit: 2000     Years since quittin.4    Smokeless tobacco: Never Used   Substance and Sexual Activity    Alcohol use: Yes     Alcohol/week: 1.0 standard drinks     Types: 1 Standard drinks or equivalent per week    Drug use: No    Sexual activity: Yes     Partners: Male     Birth control/protection: Surgical   Lifestyle    Physical activity     Days per week: Not on file     Minutes per session: Not on file    Stress: Not on file   Relationships    Social connections     Talks on phone: Not on file     Gets together: Not on file     Attends Denominational service: Not on file     Active member of club or organization: Not on file     Attends meetings of clubs or organizations: Not on file     Relationship status: Not on file    Intimate partner violence     Fear of current or ex partner: Not on file     Emotionally abused: Not on file     Physically abused: Not on file     Forced sexual activity: Not on file   Other Topics Concern    Not on file   Social History Narrative    Not on file          ROS  Per HPI    Visit Vitals  /85   Pulse 92   Temp 98 °F (36.7 °C) (Temporal)   Resp 14   Ht 5' 4\" (1.626 m)   Wt 172 lb (78 kg)   SpO2 100%   BMI 29.52 kg/m²         Physical Exam   Physical Examination: General appearance - alert, well appearing, and in no distress  Neurological - alert, oriented, normal speech, no focal findings or movement disorder noted, motor and sensory grossly normal bilaterally  Musculoskeletal - abnormal exam of right wrist with tenderness over the dorsum of the wrist.  Some mild swelling there. Normal range of motion. Negative Tinel's and Phalen sign. No erythema. , abnormal exam of left shoulder with some mild anterior joint line tenderness. Mild crepitus. Extremities - peripheral pulses normal, no pedal edema, no clubbing or cyanosis      Assessment/Plan:  Diagnoses and all orders for this visit:    1. Wrist pain, acute, right -likely sprain but could be associated with an occult fracture as well. Will obtain an x-ray. If negative, consider physical therapy. -     XR WRIST RT AP/LAT; Future    2. Acute pain of left shoulder -likely rotator cuff related as she has had previous issues on the right. Suggested physical therapy and stretching. Injection if not improved. Advised her to call back or return to office if symptoms worsen/change/persist.  Discussed expected course/resolution/complications of diagnosis in detail with patient. Medication risks/benefits/costs/interactions/alternatives discussed with patient. She was given an after visit summary which includes diagnoses, current medications, & vitals. She expressed understanding with the diagnosis and plan.

## 2020-06-12 NOTE — PATIENT INSTRUCTIONS
Rotator Cuff: Exercises Introduction Here are some examples of exercises for you to try. The exercises may be suggested for a condition or for rehabilitation. Start each exercise slowly. Ease off the exercises if you start to have pain. You will be told when to start these exercises and which ones will work best for you. How to do the exercises Pendulum swing If you have pain in your back, do not do this exercise. 1. Hold on to a table or the back of a chair with your good arm. Then bend forward a little and let your sore arm hang straight down. This exercise does not use the arm muscles. Rather, use your legs and your hips to create movement that makes your arm swing freely. 2. Use the movement from your hips and legs to guide the slightly swinging arm back and forth like a pendulum (or elephant trunk). Then guide it in circles that start small (about the size of a dinner plate). Make the circles a bit larger each day, as your pain allows. 3. Do this exercise for 5 minutes, 5 to 7 times each day. 4. As you have less pain, try bending over a little farther to do this exercise. This will increase the amount of movement at your shoulder. Posterior stretching exercise 1. Hold the elbow of your injured arm with your other hand. 2. Use your hand to pull your injured arm gently up and across your body. You will feel a gentle stretch across the back of your injured shoulder. 3. Hold for at least 15 to 30 seconds. Then slowly lower your arm. 4. Repeat 2 to 4 times. Up-the-back stretch Your doctor or physical therapist may want you to wait to do this stretch until you have regained most of your range of motion and strength. You can do this stretch in different ways. Hold any of these stretches for at least 15 to 30 seconds. Repeat them 2 to 4 times. 1. Light stretch: Put your hand in your back pocket. Let it rest there to stretch your shoulder. 2. Moderate stretch: With your other hand, hold your injured arm (palm outward) behind your back by the wrist. Pull your arm up gently to stretch your shoulder. 3. Advanced stretch: Put a towel over your other shoulder. Put the hand of your injured arm behind your back. Now hold the back end of the towel. With the other hand, hold the front end of the towel in front of your body. Pull gently on the front end of the towel. This will bring your hand farther up your back to stretch your shoulder. Overhead stretch 1. Standing about an arm's length away, grasp onto a solid surface. You could use a countertop, a doorknob, or the back of a sturdy chair. 2. With your knees slightly bent, bend forward with your arms straight. Lower your upper body, and let your shoulders stretch. 3. As your shoulders are able to stretch farther, you may need to take a step or two backward. 4. Hold for at least 15 to 30 seconds. Then stand up and relax. If you had stepped back during your stretch, step forward so you can keep your hands on the solid surface. 5. Repeat 2 to 4 times. Shoulder flexion (lying down) To make a wand for this exercise, use a piece of PVC pipe or a broom handle with the broom removed. Make the wand about a foot wider than your shoulders. 1. Lie on your back, holding a wand with both hands. Your palms should face down as you hold the wand. 2. Keeping your elbows straight, slowly raise your arms over your head. Raise them until you feel a stretch in your shoulders, upper back, and chest. 
3. Hold for 15 to 30 seconds. 4. Repeat 2 to 4 times. Shoulder rotation (lying down) To make a wand for this exercise, use a piece of PVC pipe or a broom handle with the broom removed. Make the wand about a foot wider than your shoulders. 1. Lie on your back. Hold a wand with both hands with your elbows bent and palms up.  
2. Keep your elbows close to your body, and move the wand across your body toward the sore arm. 3. Hold for 8 to 12 seconds. 4. Repeat 2 to 4 times. Wall climbing (to the side) Avoid any movement that is straight to your side, and be careful not to arch your back. Your arm should stay about 30 degrees to the front of your side. 1. Stand with your side to a wall so that your fingers can just touch it at an angle about 30 degrees toward the front of your body. 2. Walk the fingers of your injured arm up the wall as high as pain permits. Try not to shrug your shoulder up toward your ear as you move your arm up. 3. Hold that position for a count of at least 15 to 20. 
4. Walk your fingers back down to the starting position. 5. Repeat at least 2 to 4 times. Try to reach higher each time. Wall climbing (to the front) During this stretching exercise, be careful not to arch your back. 1. Face a wall, and stand so your fingers can just touch it. 2. Keeping your shoulder down, walk the fingers of your injured arm up the wall as high as pain permits. (Don't shrug your shoulder up toward your ear.) 3. Hold your arm in that position for at least 15 to 30 seconds. 4. Slowly walk your fingers back down to where you started. 5. Repeat at least 2 to 4 times. Try to reach higher each time. Shoulder blade squeeze 1. Stand with your arms at your sides, and squeeze your shoulder blades together. Do not raise your shoulders up as you squeeze. 2. Hold 6 seconds. 3. Repeat 8 to 12 times. Scapular exercise: Arm reach 1. Lie flat on your back. This exercise is a very slight motion that starts with your arms raised (elbows straight, arms straight). 2. From this position, reach higher toward the earline or ceiling. Keep your elbows straight. All motion should be from your shoulder blade only. 3. Relax your arms back to where you started. 4. Repeat 8 to 12 times. Arm raise to the side During this strengthening exercise, your arm should stay about 30 degrees to the front of your side. 1. Slowly raise your injured arm to the side, with your thumb facing up. Raise your arm 60 degrees at the most (shoulder level is 90 degrees). 2. Hold the position for 3 to 5 seconds. Then lower your arm back to your side. If you need to, bring your \"good\" arm across your body and place it under the elbow as you lower your injured arm. Use your good arm to keep your injured arm from dropping down too fast. 
3. Repeat 8 to 12 times. 4. When you first start out, don't hold any extra weight in your hand. As you get stronger, you may use a 1-pound to 2-pound dumbbell or a small can of food. Shoulder flexor and extensor exercise These are isometric exercises. That means you contract your muscles without actually moving. 1. Push forward (flex): Stand facing a wall or doorjamb, about 6 inches or less back. Hold your injured arm against your body. Make a closed fist with your thumb on top. Then gently push your hand forward into the wall with about 25% to 50% of your strength. Don't let your body move backward as you push. Hold for about 6 seconds. Relax for a few seconds. Repeat 8 to 12 times. 2. Push backward (extend): Stand with your back flat against a wall. Your upper arm should be against the wall, with your elbow bent 90 degrees (your hand straight ahead). Push your elbow gently back against the wall with about 25% to 50% of your strength. Don't let your body move forward as you push. Hold for about 6 seconds. Relax for a few seconds. Repeat 8 to 12 times. Scapular exercise: Wall push-ups This exercise is best done with your fingers somewhat turned out, rather than straight up and down. 1. Stand facing a wall, about 12 inches to 18 inches away. 2. Place your hands on the wall at shoulder height. 3. Slowly bend your elbows and bring your face to the wall. Keep your back and hips straight. 4. Push back to where you started. 5. Repeat 8 to 12 times. 6. When you can do this exercise against a wall comfortably, you can try it against a counter. You can then slowly progress to the end of a couch, then to a sturdy chair, and finally to the floor. Scapular exercise: Retraction For this exercise, you will need elastic exercise material, such as surgical tubing or Thera-Band. 1. Put the band around a solid object at about waist level. (A bedpost will work well.) Each hand should hold an end of the band. 2. With your elbows at your sides and bent to 90 degrees, pull the band back. Your shoulder blades should move toward each other. Then move your arms back where you started. 3. Repeat 8 to 12 times. 4. If you have good range of motion in your shoulders, try this exercise with your arms lifted out to the sides. Keep your elbows at a 90-degree angle. Raise the elastic band up to about shoulder level. Pull the band back to move your shoulder blades toward each other. Then move your arms back where you started. Internal rotator strengthening exercise 1. Start by tying a piece of elastic exercise material to a doorknob. You can use surgical tubing or Thera-Band. 2. Stand or sit with your shoulder relaxed and your elbow bent 90 degrees. Your upper arm should rest comfortably against your side. Squeeze a rolled towel between your elbow and your body for comfort. This will help keep your arm at your side. 3. Hold one end of the elastic band in the hand of the painful arm. 4. Slowly rotate your forearm toward your body until it touches your belly. Slowly move it back to where you started. 5. Keep your elbow and upper arm firmly tucked against the towel roll or at your side. 6. Repeat 8 to 12 times. External rotator strengthening exercise 1. Start by tying a piece of elastic exercise material to a doorknob. You can use surgical tubing or Thera-Band. (You may also hold one end of the band in each hand.) 2. Stand or sit with your shoulder relaxed and your elbow bent 90 degrees. Your upper arm should rest comfortably against your side. Squeeze a rolled towel between your elbow and your body for comfort. This will help keep your arm at your side. 3. Hold one end of the elastic band with the hand of the painful arm. 4. Start with your forearm across your belly. Slowly rotate the forearm out away from your body. Keep your elbow and upper arm tucked against the towel roll or the side of your body until you begin to feel tightness in your shoulder. Slowly move your arm back to where you started. 5. Repeat 8 to 12 times. Follow-up care is a key part of your treatment and safety. Be sure to make and go to all appointments, and call your doctor if you are having problems. It's also a good idea to know your test results and keep a list of the medicines you take. Where can you learn more? Go to http://gingerALCOHOOT.info/ Enter Kenny Morrell in the search box to learn more about \"Rotator Cuff: Exercises. \" Current as of: March 2, 2020               Content Version: 12.5 © 7014-8120 [employer]. Care instructions adapted under license by [employer] (which disclaims liability or warranty for this information). If you have questions about a medical condition or this instruction, always ask your healthcare professional. [address] any warranty or liability for your use of this information. Rotator Cuff: Exercises Introduction Here are some examples of exercises for you to try. The exercises may be suggested for a condition or for rehabilitation. Start each exercise slowly. Ease off the exercises if you start to have pain. You will be told when to start these exercises and which ones will work best for you. How to do the exercises Pendulum swing If you have pain in your back, do not do this exercise. 5. Hold on to a table or the back of a chair with your good arm. Then bend forward a little and let your sore arm hang straight down. This exercise does not use the arm muscles. Rather, use your legs and your hips to create movement that makes your arm swing freely. 6. Use the movement from your hips and legs to guide the slightly swinging arm back and forth like a pendulum (or elephant trunk). Then guide it in circles that start small (about the size of a dinner plate). Make the circles a bit larger each day, as your pain allows. 7. Do this exercise for 5 minutes, 5 to 7 times each day. 8. As you have less pain, try bending over a little farther to do this exercise. This will increase the amount of movement at your shoulder. Posterior stretching exercise 5. Hold the elbow of your injured arm with your other hand. 6. Use your hand to pull your injured arm gently up and across your body. You will feel a gentle stretch across the back of your injured shoulder. 7. Hold for at least 15 to 30 seconds. Then slowly lower your arm. 8. Repeat 2 to 4 times. Up-the-back stretch Your doctor or physical therapist may want you to wait to do this stretch until you have regained most of your range of motion and strength. You can do this stretch in different ways. Hold any of these stretches for at least 15 to 30 seconds. Repeat them 2 to 4 times. 4. Light stretch: Put your hand in your back pocket. Let it rest there to stretch your shoulder. 5. Moderate stretch: With your other hand, hold your injured arm (palm outward) behind your back by the wrist. Pull your arm up gently to stretch your shoulder. 6. Advanced stretch: Put a towel over your other shoulder. Put the hand of your injured arm behind your back. Now hold the back end of the towel. With the other hand, hold the front end of the towel in front of your body. Pull gently on the front end of the towel.  This will bring your hand farther up your back to stretch your shoulder. Overhead stretch 6. Standing about an arm's length away, grasp onto a solid surface. You could use a countertop, a doorknob, or the back of a sturdy chair. 7. With your knees slightly bent, bend forward with your arms straight. Lower your upper body, and let your shoulders stretch. 8. As your shoulders are able to stretch farther, you may need to take a step or two backward. 9. Hold for at least 15 to 30 seconds. Then stand up and relax. If you had stepped back during your stretch, step forward so you can keep your hands on the solid surface. 10. Repeat 2 to 4 times. Shoulder flexion (lying down) To make a wand for this exercise, use a piece of PVC pipe or a broom handle with the broom removed. Make the wand about a foot wider than your shoulders. 5. Lie on your back, holding a wand with both hands. Your palms should face down as you hold the wand. 6. Keeping your elbows straight, slowly raise your arms over your head. Raise them until you feel a stretch in your shoulders, upper back, and chest. 
7. Hold for 15 to 30 seconds. 8. Repeat 2 to 4 times. Shoulder rotation (lying down) To make a wand for this exercise, use a piece of PVC pipe or a broom handle with the broom removed. Make the wand about a foot wider than your shoulders. 5. Lie on your back. Hold a wand with both hands with your elbows bent and palms up. 6. Keep your elbows close to your body, and move the wand across your body toward the sore arm. 7. Hold for 8 to 12 seconds. 8. Repeat 2 to 4 times. Wall climbing (to the side) Avoid any movement that is straight to your side, and be careful not to arch your back. Your arm should stay about 30 degrees to the front of your side. 6. Stand with your side to a wall so that your fingers can just touch it at an angle about 30 degrees toward the front of your body.  
7. Walk the fingers of your injured arm up the wall as high as pain permits. Try not to shrug your shoulder up toward your ear as you move your arm up. 8. Hold that position for a count of at least 15 to 20. 
9. Walk your fingers back down to the starting position. 10. Repeat at least 2 to 4 times. Try to reach higher each time. Wall climbing (to the front) During this stretching exercise, be careful not to arch your back. 6. Face a wall, and stand so your fingers can just touch it. 7. Keeping your shoulder down, walk the fingers of your injured arm up the wall as high as pain permits. (Don't shrug your shoulder up toward your ear.) 8. Hold your arm in that position for at least 15 to 30 seconds. 9. Slowly walk your fingers back down to where you started. 10. Repeat at least 2 to 4 times. Try to reach higher each time. Shoulder blade squeeze 4. Stand with your arms at your sides, and squeeze your shoulder blades together. Do not raise your shoulders up as you squeeze. 5. Hold 6 seconds. 6. Repeat 8 to 12 times. Scapular exercise: Arm reach 5. Lie flat on your back. This exercise is a very slight motion that starts with your arms raised (elbows straight, arms straight). 6. From this position, reach higher toward the earline or ceiling. Keep your elbows straight. All motion should be from your shoulder blade only. 7. Relax your arms back to where you started. 8. Repeat 8 to 12 times. Arm raise to the side During this strengthening exercise, your arm should stay about 30 degrees to the front of your side. 5. Slowly raise your injured arm to the side, with your thumb facing up. Raise your arm 60 degrees at the most (shoulder level is 90 degrees). 6. Hold the position for 3 to 5 seconds. Then lower your arm back to your side. If you need to, bring your \"good\" arm across your body and place it under the elbow as you lower your injured arm. Use your good arm to keep your injured arm from dropping down too fast. 
7. Repeat 8 to 12 times. 8. When you first start out, don't hold any extra weight in your hand. As you get stronger, you may use a 1-pound to 2-pound dumbbell or a small can of food. Shoulder flexor and extensor exercise These are isometric exercises. That means you contract your muscles without actually moving. 3. Push forward (flex): Stand facing a wall or doorjamb, about 6 inches or less back. Hold your injured arm against your body. Make a closed fist with your thumb on top. Then gently push your hand forward into the wall with about 25% to 50% of your strength. Don't let your body move backward as you push. Hold for about 6 seconds. Relax for a few seconds. Repeat 8 to 12 times. 4. Push backward (extend): Stand with your back flat against a wall. Your upper arm should be against the wall, with your elbow bent 90 degrees (your hand straight ahead). Push your elbow gently back against the wall with about 25% to 50% of your strength. Don't let your body move forward as you push. Hold for about 6 seconds. Relax for a few seconds. Repeat 8 to 12 times. Scapular exercise: Wall push-ups This exercise is best done with your fingers somewhat turned out, rather than straight up and down. 7. Stand facing a wall, about 12 inches to 18 inches away. 8. Place your hands on the wall at shoulder height. 9. Slowly bend your elbows and bring your face to the wall. Keep your back and hips straight. 10. Push back to where you started. 11. Repeat 8 to 12 times. 12. When you can do this exercise against a wall comfortably, you can try it against a counter. You can then slowly progress to the end of a couch, then to a sturdy chair, and finally to the floor. Scapular exercise: Retraction For this exercise, you will need elastic exercise material, such as surgical tubing or Thera-Band. 5. Put the band around a solid object at about waist level. (A bedpost will work well.) Each hand should hold an end of the band. 6. With your elbows at your sides and bent to 90 degrees, pull the band back. Your shoulder blades should move toward each other. Then move your arms back where you started. 7. Repeat 8 to 12 times. 8. If you have good range of motion in your shoulders, try this exercise with your arms lifted out to the sides. Keep your elbows at a 90-degree angle. Raise the elastic band up to about shoulder level. Pull the band back to move your shoulder blades toward each other. Then move your arms back where you started. Internal rotator strengthening exercise 7. Start by tying a piece of elastic exercise material to a doorknob. You can use surgical tubing or Thera-Band. 8. Stand or sit with your shoulder relaxed and your elbow bent 90 degrees. Your upper arm should rest comfortably against your side. Squeeze a rolled towel between your elbow and your body for comfort. This will help keep your arm at your side. 9. Hold one end of the elastic band in the hand of the painful arm. 10. Slowly rotate your forearm toward your body until it touches your belly. Slowly move it back to where you started. 11. Keep your elbow and upper arm firmly tucked against the towel roll or at your side. 12. Repeat 8 to 12 times. External rotator strengthening exercise 6. Start by tying a piece of elastic exercise material to a doorknob. You can use surgical tubing or Thera-Band. (You may also hold one end of the band in each hand.) 7. Stand or sit with your shoulder relaxed and your elbow bent 90 degrees. Your upper arm should rest comfortably against your side. Squeeze a rolled towel between your elbow and your body for comfort. This will help keep your arm at your side. 8. Hold one end of the elastic band with the hand of the painful arm. 9. Start with your forearm across your belly. Slowly rotate the forearm out away from your body.  Keep your elbow and upper arm tucked against the towel roll or the side of your body until you begin to feel tightness in your shoulder. Slowly move your arm back to where you started. 10. Repeat 8 to 12 times. Follow-up care is a key part of your treatment and safety. Be sure to make and go to all appointments, and call your doctor if you are having problems. It's also a good idea to know your test results and keep a list of the medicines you take. Where can you learn more? Go to http://ginger-leon.info/ Enter David Isael in the search box to learn more about \"Rotator Cuff: Exercises. \" Current as of: March 2, 2020               Content Version: 12.5 © 4355-2472 [employer]. Care instructions adapted under license by [employer] (which disclaims liability or warranty for this information). If you have questions about a medical condition or this instruction, always ask your healthcare professional. [place] [address] any warranty or liability for your use of this information. Wrist Sprain: Rehab Exercises Introduction Here are some examples of exercises for you to try. The exercises may be suggested for a condition or for rehabilitation. Start each exercise slowly. Ease off the exercises if you start to have pain. You will be told when to start these exercises and which ones will work best for you. How to do the exercises Resisted wrist extension 1. Sit leaning forward with your legs slightly spread. Then place your forearm on your thigh with your affected hand and wrist in front of your knee. 2. Grasp one end of an exercise band with your palm down. Step on the other end. 
3. Slowly bend your wrist upward for a count of 2. Then lower your wrist slowly to a count of 5. 
4. Repeat 8 to 12 times. Resisted wrist flexion 1. Sit leaning forward with your legs slightly spread. Then place your forearm on your thigh with your affected hand and wrist in front of your knee. 2. Grasp one end of an exercise band with your palm up. Step on the other end. 
3. Slowly bend your wrist upward for a count of 2. Then lower your wrist slowly to a count of 5. 
4. Repeat 8 to 12 times. Resisted radial deviation 1. Sit leaning forward with your legs slightly spread. Then place your forearm on your thigh with your affected hand and wrist in front of your knee. 2. Grasp one end of an exercise band with your hand facing toward your other thigh. Step on the other end. 
3. Slowly bend your wrist upward for a count of 2. Then lower your wrist slowly to a count of 5. 
4. Repeat 8 to 12 times. Resisted ulnar deviation 1. Sit leaning forward with your legs slightly spread. Then place your forearm on your thigh with your affected hand and wrist by the inside of your knee. 2. Grasp one end of an exercise band with your palm down. Step on the other end with the foot opposite the hand holding the band. 3. Slowly bend your wrist outward and toward your knee for a count of 2. Then slowly move your wrist back to the starting position to a count of 5. 
4. Repeat 8 to 12 times. Resisted forearm pronation 1. Sit leaning forward with your legs slightly spread. Then place your forearm on your thigh with your affected hand and wrist in front of your knee. 2. Grasp one end of an exercise band with your palm up. Step on the other end. 3. Keeping your wrist straight, roll your palm inward toward your thigh for a count of 2. Then slowly move your wrist back to the starting position to a count of 5. 
4. Repeat 8 to 12 times. Resisted supination 1. Sit leaning forward with your legs slightly spread. Then place your forearm on your thigh with your affected hand and wrist in front of your knee. 2. Grasp one end of an exercise band with your palm down. Step on the other end.  
3. Keeping your wrist straight, roll your palm outward and away from your thigh for a count of 2. Then slowly move your wrist back to the starting position to a count of 5. 
4. Repeat 8 to 12 times. Follow-up care is a key part of your treatment and safety. Be sure to make and go to all appointments, and call your doctor if you are having problems. It's also a good idea to know your test results and keep a list of the medicines you take. Where can you learn more? Go to http://ginger-leon.info/ Enter S110 in the search box to learn more about \"Wrist Sprain: Rehab Exercises. \" Current as of: March 2, 2020               Content Version: 12.5 © 1054-3037 [employer]. Care instructions adapted under license by [employer] (which disclaims liability or warranty for this information). If you have questions about a medical condition or this instruction, always ask your healthcare professional. [address] any warranty or liability for your use of this information.

## 2020-10-08 DIAGNOSIS — R63.8 WEIGHT DISORDER: ICD-10-CM

## 2020-10-08 RX ORDER — PHENTERMINE AND TOPIRAMATE 15; 92 MG/1; MG/1
CAPSULE, EXTENDED RELEASE ORAL
Qty: 30 CAP | Refills: 1 | Status: SHIPPED | OUTPATIENT
Start: 2020-10-08 | End: 2021-01-20

## 2021-01-20 ENCOUNTER — OFFICE VISIT (OUTPATIENT)
Dept: INTERNAL MEDICINE CLINIC | Age: 61
End: 2021-01-20
Payer: COMMERCIAL

## 2021-01-20 VITALS
SYSTOLIC BLOOD PRESSURE: 128 MMHG | WEIGHT: 188 LBS | OXYGEN SATURATION: 97 % | DIASTOLIC BLOOD PRESSURE: 82 MMHG | TEMPERATURE: 98.4 F | BODY MASS INDEX: 32.1 KG/M2 | HEIGHT: 64 IN | RESPIRATION RATE: 14 BRPM | HEART RATE: 85 BPM

## 2021-01-20 DIAGNOSIS — R63.8 WEIGHT DISORDER: ICD-10-CM

## 2021-01-20 DIAGNOSIS — M17.0 PRIMARY OSTEOARTHRITIS OF BOTH KNEES: ICD-10-CM

## 2021-01-20 DIAGNOSIS — M65.311 TRIGGER FINGER OF RIGHT THUMB: ICD-10-CM

## 2021-01-20 DIAGNOSIS — H81.10 BENIGN PAROXYSMAL POSITIONAL VERTIGO, UNSPECIFIED LATERALITY: Primary | ICD-10-CM

## 2021-01-20 PROCEDURE — 99214 OFFICE O/P EST MOD 30 MIN: CPT | Performed by: INTERNAL MEDICINE

## 2021-01-20 RX ORDER — TOPIRAMATE 100 MG/1
100 TABLET, FILM COATED ORAL
Qty: 90 TAB | Refills: 1 | Status: SHIPPED | OUTPATIENT
Start: 2021-01-20 | End: 2021-05-19 | Stop reason: ALTCHOICE

## 2021-01-20 RX ORDER — PHENTERMINE HYDROCHLORIDE 37.5 MG/1
TABLET ORAL
Qty: 30 TAB | Refills: 0 | Status: SHIPPED | OUTPATIENT
Start: 2021-01-20 | End: 2021-03-19

## 2021-01-20 RX ORDER — MECLIZINE HYDROCHLORIDE 25 MG/1
25 TABLET ORAL
COMMUNITY
Start: 2021-01-20 | End: 2021-05-19 | Stop reason: ALTCHOICE

## 2021-01-20 NOTE — PATIENT INSTRUCTIONS
Benign Paroxysmal Positional Vertigo (BPPV): Care Instructions Your Care Instructions Benign paroxysmal positional vertigo, also called BPPV, is an inner ear problem. It causes a spinning or whirling sensation when you move your head. This sensation is called vertigo. The vertigo usually lasts for less than a minute. People often have vertigo spells for a few days or weeks. Then the vertigo goes away. But it may come back again. The vertigo may be mild, or it may be bad enough to cause unsteadiness, nausea, and vomiting. When you move, your inner ear sends messages to the brain. This helps you keep your balance. Vertigo can happen when debris builds up in the inner ear. The buildup can cause the inner ear to send the wrong message to the brain. Your doctor may move you in different positions to help your vertigo get better faster. This is called the Epley maneuver. Your doctor may also prescribe medicines or exercises to help with your symptoms. Follow-up care is a key part of your treatment and safety. Be sure to make and go to all appointments, and call your doctor if you are having problems. It's also a good idea to know your test results and keep a list of the medicines you take. How can you care for yourself at home? · If your doctor suggests that you do Van-Daroff exercises: 
? Sit on the edge of a bed or sofa. Quickly lie down on the side that causes the worst vertigo. Lie on your side with your ear down. ? Stay in this position for at least 30 seconds or until the vertigo goes away. ? Sit up. If this causes vertigo, wait for it to stop. ? Repeat the procedure on the other side. ? Repeat this 10 times. Do these exercises 2 times a day until the vertigo is gone. When should you call for help? Call 911 anytime you think you may need emergency care. For example, call if: 
  · You have symptoms of a stroke. These may include: ? Sudden numbness, tingling, weakness, or loss of movement in your face, arm, or leg, especially on only one side of your body. ? Sudden vision changes. ? Sudden trouble speaking. ? Sudden confusion or trouble understanding simple statements. ? Sudden problems with walking or balance. ? A sudden, severe headache that is different from past headaches. Call your doctor now or seek immediate medical care if: 
  · You have new or worse nausea and vomiting.  
  · You have new symptoms such as hearing loss or roaring in your ears. Watch closely for changes in your health, and be sure to contact your doctor if: 
  · You are not getting better as expected.  
  · Your vertigo gets worse. Where can you learn more? Go to http://www.Zhou Heiya/ Enter  in the search box to learn more about \"Benign Paroxysmal Positional Vertigo (BPPV): Care Instructions. \" Current as of: April 15, 2020               Content Version: 12.6 © 2051-3181 [employer]. Care instructions adapted under license by [employer] (which disclaims liability or warranty for this information). If you have questions about a medical condition or this instruction, always ask your healthcare professional. [place] [address] any warranty or liability for your use of this information. Vertigo: Exercises Introduction Here are some examples of exercises for you to try. The exercises may be suggested for a condition or for rehabilitation. Start each exercise slowly. Ease off the exercises if you start to have pain. You will be told when to start these exercises and which ones will work best for you. How to do the exercises Exercise 1 1. Stand with a chair in front of you and a wall behind you. If you begin to fall, you may use them for support. 2. Stand with your feet together and your arms at your sides. 3. Move your head up and down 10 times.    
Exercise 2  
 1. Move your head side to side 10 times. Exercise 3 1. Move your head diagonally up and down 10 times. Exercise 4 1. Move your head diagonally up and down 10 times on the other side. Follow-up care is a key part of your treatment and safety. Be sure to make and go to all appointments, and call your doctor if you are having problems. It's also a good idea to know your test results and keep a list of the medicines you take. Where can you learn more? Go to http://www.gray.com/ Enter F349 in the search box to learn more about \"Vertigo: Exercises. \" Current as of: April 15, 2020               Content Version: 12.6 © 2006-2020 [employer]. Care instructions adapted under license by [employer] (which disclaims liability or warranty for this information). If you have questions about a medical condition or this instruction, always ask your healthcare professional. [address] any warranty or liability for your use of this information.
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
## 2021-02-24 ENCOUNTER — TELEPHONE (OUTPATIENT)
Dept: INTERNAL MEDICINE CLINIC | Age: 61
End: 2021-02-24

## 2021-03-19 DIAGNOSIS — R63.8 WEIGHT DISORDER: ICD-10-CM

## 2021-03-19 RX ORDER — PHENTERMINE HYDROCHLORIDE 37.5 MG/1
TABLET ORAL
Qty: 30 TAB | Refills: 0 | Status: SHIPPED | OUTPATIENT
Start: 2021-03-19 | End: 2021-05-19 | Stop reason: SDUPTHER

## 2021-05-19 ENCOUNTER — OFFICE VISIT (OUTPATIENT)
Dept: INTERNAL MEDICINE CLINIC | Age: 61
End: 2021-05-19
Payer: COMMERCIAL

## 2021-05-19 VITALS
OXYGEN SATURATION: 100 % | RESPIRATION RATE: 16 BRPM | WEIGHT: 181 LBS | DIASTOLIC BLOOD PRESSURE: 72 MMHG | HEIGHT: 64 IN | HEART RATE: 77 BPM | BODY MASS INDEX: 30.9 KG/M2 | SYSTOLIC BLOOD PRESSURE: 135 MMHG | TEMPERATURE: 97.6 F

## 2021-05-19 DIAGNOSIS — R63.8 WEIGHT DISORDER: ICD-10-CM

## 2021-05-19 DIAGNOSIS — E66.9 OBESITY (BMI 30.0-34.9): Primary | ICD-10-CM

## 2021-05-19 PROCEDURE — 99213 OFFICE O/P EST LOW 20 MIN: CPT | Performed by: INTERNAL MEDICINE

## 2021-05-19 RX ORDER — PHENTERMINE HYDROCHLORIDE 37.5 MG/1
37.5 TABLET ORAL
Qty: 30 TABLET | Refills: 0 | Status: SHIPPED | OUTPATIENT
Start: 2021-05-19 | End: 2021-08-26

## 2021-05-19 NOTE — PROGRESS NOTES
Verified name and birth date for privacy precautions. Chart reviewed in preparation for today's visit.      Chief Complaint   Patient presents with    Medication Evaluation          Health Maintenance Due   Topic    COVID-19 Vaccine (1)    PAP AKA CERVICAL CYTOLOGY          Wt Readings from Last 3 Encounters:   05/19/21 181 lb (82.1 kg)   01/20/21 188 lb (85.3 kg)   06/12/20 172 lb (78 kg)     Temp Readings from Last 3 Encounters:   05/19/21 97.6 °F (36.4 °C) (Temporal)   01/20/21 98.4 °F (36.9 °C) (Temporal)   06/12/20 98 °F (36.7 °C) (Temporal)     BP Readings from Last 3 Encounters:   05/19/21 135/72   01/20/21 128/82   06/12/20 125/85     Pulse Readings from Last 3 Encounters:   05/19/21 77   01/20/21 85   06/12/20 92         Learning Assessment:  :     Learning Assessment 1/29/2015   PRIMARY LEARNER Patient   HIGHEST LEVEL OF EDUCATION - PRIMARY LEARNER  SOME COLLEGE   BARRIERS PRIMARY LEARNER NONE   PRIMARY LANGUAGE ENGLISH   LEARNER PREFERENCE PRIMARY DEMONSTRATION   ANSWERED BY Patient   RELATIONSHIP SELF       Depression Screening:  :     3 most recent PHQ Screens 5/19/2021   Little interest or pleasure in doing things Not at all   Feeling down, depressed, irritable, or hopeless Not at all   Total Score PHQ 2 0   Trouble falling or staying asleep, or sleeping too much -   Feeling tired or having little energy -   Poor appetite, weight loss, or overeating -   Feeling bad about yourself - or that you are a failure or have let yourself or your family down -   Trouble concentrating on things such as school, work, reading, or watching TV -   Moving or speaking so slowly that other people could have noticed; or the opposite being so fidgety that others notice -   Thoughts of being better off dead, or hurting yourself in some way -   PHQ 9 Score -   How difficult have these problems made it for you to do your work, take care of your home and get along with others -       Fall Risk Assessment:  :     No flowsheet data found. Abuse Screening:  :     Abuse Screening Questionnaire 5/19/2021   Do you ever feel afraid of your partner? N   Are you in a relationship with someone who physically or mentally threatens you? N   Is it safe for you to go home?  Charlene Casey

## 2021-05-19 NOTE — PROGRESS NOTES
HPI:  Jannifer Kehr is a 64y.o. year old female who is here for a routine visit:    Presents for a follow-up visit. She wants to continue using phentermine to help with her weight loss. She is down about 7 pounds over the last 4 months with using it. She was supposed to take a half a tablet and did not recognize the directions and has been taking a whole tablet intermittently. She does feel that it helps with her appetite suppression. She has had no side effects from the medication. No sleep issues. No palpitations. No chest pains or shortness of breath. No cough or wheeze. No change in bowel or bladder habits. She has had recent issues with shoulder pain and had them injected by orthopedics. Past Medical History:   Diagnosis Date    Arthritis     Depression, major, recurrent, moderate ([address] 8/15/2019    Other specified dorsopathies, sacral and sacrococcygeal region (CODE)        Past Surgical History:   Procedure Laterality Date    HX COLONOSCOPY  1/15/15    Dr. Cheri Hernandez - 8 yr f/u    HX HYSTERECTOMY  2003    HX [address] PROCEDURES  2003    right shoulder    HX ORTHOPAEDIC  12/2009    bilateral knee replacement       Prior to Admission medications    Medication Sig Start Date End Date Taking? Authorizing Provider   phentermine (ADIPEX-P) 37.5 mg tablet Take 1 Tablet by mouth daily as needed (appetite suppressant). 5/19/21  Yes David Schuster MD   gabapentin (NEURONTIN) 300 mg capsule TAKE 1 CAPSULE BY MOUTH THREE TIMES DAILY AS NEEDED FOR PAIN 6/5/20  Yes Jesus Cisse III, MD   naproxen sodium (ALEVE) 220 mg cap Take 440 mg by mouth as needed. Yes Provider, Historical   phentermine (ADIPEX-P) 37.5 mg tablet TAKE 1/2 TABLET BY MOUTH DAILY 3/19/21 5/19/21  Jesus Cisse III, MD   topiramate (TOPAMAX) 100 mg tablet Take 1 Tab by mouth nightly.   Patient not taking: Reported on 5/19/2021 1/20/21 5/19/21  David Schuster MD   meclizine (ANTIVERT) 25 mg tablet Take 1 Tab by mouth three (3) times daily as needed for Dizziness. Patient not taking: Reported on 2021  Robbie Baker MD   tiZANidine (ZANAFLEX) 4 mg tablet Take 1 Tab by mouth nightly as needed for Muscle Spasm(s). Patient not taking: Reported on 2021  Robbie Baker MD       Social History     Socioeconomic History    Marital status:      Spouse name: Not on file    Number of children: Not on file    Years of education: Not on file    Highest education level: Not on file   Occupational History    Not on file   Tobacco Use    Smoking status: Former Smoker     Years: 2.00     Quit date:      Years since quittin.3    Smokeless tobacco: Never Used   Substance and Sexual Activity    Alcohol use: Yes     Alcohol/week: 3.0 standard drinks     Types: 3 Standard drinks or equivalent per week    Drug use: No    Sexual activity: Yes     Partners: Male     Birth control/protection: Surgical   Other Topics Concern    Not on file   Social History Narrative    Not on file     Social Determinants of Health     Financial Resource Strain:     Difficulty of Paying Living Expenses:    Food Insecurity:     Worried About Running Out of Food in the Last Year:     [address] in the Last Year:    Transportation Needs:     Lack of Transportation (Medical):      Lack of Transportation (Non-Medical):    Physical Activity:     Days of Exercise per Week:     Minutes of Exercise per Session:    Stress:     Feeling of Stress :    Social Connections:     Frequency of Communication with Friends and Family:     Frequency of Social Gatherings with Friends and Family:     Attends Restorationist Services:     Active Member of Clubs or Organizations:     Attends Club or Organization Meetings:     Marital Status:    Intimate Partner Violence:     Fear of Current or Ex-Partner:     Emotionally Abused:     Physically Abused:     Sexually Abused:           ROS  Per HPI    Visit Vitals  /72 (BP 1 Location: Left arm, BP Patient Position: Sitting, BP Cuff Size: Large adult)   Pulse 77   Temp 97.6 °F (36.4 °C) (Temporal)   Resp 16   Ht 5' 4\" (1.626 m) Comment: obtained at previous office visit   Wt 181 lb (82.1 kg)   SpO2 100%   BMI 31.07 kg/m²         Physical Exam   Physical Examination: General appearance - alert, well appearing, and in no distress  Chest - clear to auscultation, no wheezes, rales or rhonchi, symmetric air entry  Heart - normal rate, regular rhythm, normal S1, S2, no murmurs, rubs, clicks or gallops      Assessment/Plan:  Diagnoses and all orders for this visit:    1. Obesity (BMI 30.0-34.9) -we will continue phentermine for 3 more months. She was advised that she will have to stop after that in order to take a break. We will continue to work on diet and exercise. 2. Weight disorder  -     phentermine (ADIPEX-P) 37.5 mg tablet; Take 1 Tablet by mouth daily as needed (appetite suppressant). Advised her to call back or return to office if symptoms worsen/change/persist.  Discussed expected course/resolution/complications of diagnosis in detail with patient. Medication risks/benefits/costs/interactions/alternatives discussed with patient. She was given an after visit summary which includes diagnoses, current medications, & vitals. She expressed understanding with the diagnosis and plan.

## 2021-08-26 DIAGNOSIS — R63.8 WEIGHT DISORDER: ICD-10-CM

## 2021-08-26 RX ORDER — PHENTERMINE HYDROCHLORIDE 37.5 MG/1
TABLET ORAL
Qty: 30 TABLET | Refills: 0 | Status: SHIPPED | OUTPATIENT
Start: 2021-08-26 | End: 2021-11-11

## 2021-11-11 DIAGNOSIS — R63.8 WEIGHT DISORDER: ICD-10-CM

## 2021-11-11 RX ORDER — PHENTERMINE HYDROCHLORIDE 37.5 MG/1
TABLET ORAL
Qty: 30 TABLET | Refills: 0 | Status: SHIPPED | OUTPATIENT
Start: 2021-11-11 | End: 2022-05-19

## 2022-03-19 PROBLEM — F33.1 DEPRESSION, MAJOR, RECURRENT, MODERATE (HCC): Status: ACTIVE | Noted: 2019-08-15

## 2022-03-30 ENCOUNTER — OFFICE VISIT (OUTPATIENT)
Dept: INTERNAL MEDICINE CLINIC | Age: 62
End: 2022-03-30
Payer: COMMERCIAL

## 2022-03-30 VITALS
DIASTOLIC BLOOD PRESSURE: 88 MMHG | BODY MASS INDEX: 32.95 KG/M2 | RESPIRATION RATE: 18 BRPM | WEIGHT: 193 LBS | TEMPERATURE: 97.8 F | OXYGEN SATURATION: 98 % | HEIGHT: 64 IN | SYSTOLIC BLOOD PRESSURE: 147 MMHG | HEART RATE: 97 BPM

## 2022-03-30 DIAGNOSIS — M70.50 PES ANSERINE BURSITIS: Primary | ICD-10-CM

## 2022-03-30 DIAGNOSIS — Z12.31 SCREENING MAMMOGRAM FOR BREAST CANCER: ICD-10-CM

## 2022-03-30 DIAGNOSIS — M19.019 SHOULDER ARTHRITIS: ICD-10-CM

## 2022-03-30 DIAGNOSIS — M17.10 ARTHRITIS OF KNEE: ICD-10-CM

## 2022-03-30 PROCEDURE — 99214 OFFICE O/P EST MOD 30 MIN: CPT | Performed by: INTERNAL MEDICINE

## 2022-03-30 NOTE — PROGRESS NOTES
HPI:  Ezekiel Fountain is a 63y.o. year old female who is here for a follow-up visit. She recently signed up for a gym in [place] where she was living. She then had to change jobs and has moved back to [place]. For this reason she needs to get out of her gym contract. She is also had ongoing issues with shoulder pain. She has been trying to lose weight without using phentermine. She denies any nausea or vomiting. She does have pain in both of her knees particularly at night when she is trying to lie in bed. Has had some palpitations that seem to occur lying in bed as well. No palpitations with exertion. Past Medical History:   Diagnosis Date    Arthritis     Depression, major, recurrent, moderate ([address] 8/15/2019    Other specified dorsopathies, sacral and sacrococcygeal region (CODE)        Past Surgical History:   Procedure Laterality Date    HX COLONOSCOPY  1/15/15    Dr. Mtichell Collazo - 8 yr f/u    HX HYSTERECTOMY      HX [address] PROCEDURES      right shoulder    HX ORTHOPAEDIC  2009    bilateral knee replacement       Prior to Admission medications    Medication Sig Start Date End Date Taking? Authorizing Provider   phentermine (ADIPEX-P) 37.5 mg tablet TAKE 1 TABLET BY MOUTH DAILY AS NEEDED 21  Yes Bhavana Hess III, MD   naproxen sodium (ALEVE) 220 mg cap Take 440 mg by mouth as needed.    Yes Provider, Historical   gabapentin (NEURONTIN) 300 mg capsule TAKE 1 CAPSULE BY MOUTH THREE TIMES DAILY AS NEEDED FOR PAIN  Patient not taking: Reported on 3/30/2022 6/5/20 3/30/22  Cassia Carrillo MD       Social History     Socioeconomic History    Marital status:      Spouse name: Not on file    Number of children: Not on file    Years of education: Not on file    Highest education level: Not on file   Occupational History    Not on file   Tobacco Use    Smoking status: Former Smoker     Years: 2.00     Quit date:      Years since quittin.2    Smokeless tobacco: Never Used   Vaping Use    Vaping Use: Never used   Substance and Sexual Activity    Alcohol use: Yes     Alcohol/week: 3.0 standard drinks     Types: 3 Standard drinks or equivalent per week    Drug use: No    Sexual activity: Yes     Partners: Male     Birth control/protection: Surgical   Other Topics Concern    Not on file   Social History Narrative    Not on file     Social Determinants of Health     Financial Resource Strain:     Difficulty of Paying Living Expenses: Not on file   Food Insecurity:     Worried About Running Out of Food in the Last Year: Not on file    Antonio of Food in the Last Year: Not on file   Transportation Needs:     Lack of Transportation (Medical): Not on file    Lack of Transportation (Non-Medical):  Not on file   Physical Activity:     Days of Exercise per Week: Not on file    Minutes of Exercise per Session: Not on file   Stress:     Feeling of Stress : Not on file   Social Connections:     Frequency of Communication with Friends and Family: Not on file    Frequency of Social Gatherings with Friends and Family: Not on file    Attends Yazidi Services: Not on file    Active Member of [address] [place] [address] or Organizations: Not on file    Attends Club or Organization Meetings: Not on file    Marital Status: Not on file   Intimate Partner Violence:     Fear of Current or Ex-Partner: Not on file    Emotionally Abused: Not on file    Physically Abused: Not on file    Sexually Abused: Not on file   Housing Stability:     Unable to Pay for Housing in the Last Year: Not on file    Number of [place] in the Last Year: Not on file    Unstable Housing in the Last Year: Not on file          ROS  Per HPI    Visit Vitals  BP (!) 147/88 (BP 1 Location: Left upper arm, BP Patient Position: Sitting, BP Cuff Size: Large adult)   Pulse 97   Temp 97.8 °F (36.6 °C) (Temporal)   Resp 18   Ht 5' 4\" (1.626 m)   Wt 193 lb (87.5 kg)   SpO2 98%   BMI 33.13 kg/m²         Physical Exam Physical Examination: General appearance - alert, well appearing, and in no distress  Chest - clear to auscultation, no wheezes, rales or rhonchi, symmetric air entry  Heart - normal rate and regular rhythm  Musculoskeletal - abnormal exam of both knees with tenderness over the pes anserine bursa as well as the medial and lateral joint lines. No swelling or redness. Extremities - peripheral pulses normal, no pedal edema, no clubbing or cyanosis      Assessment/Plan:  Diagnoses and all orders for this visit:    1. Pes anserine bursitis-we will treat with stretching exercises and she will let me know if not improving. 2. Screening mammogram for breast cancer  -     [facility] 3D CIRILO W MAMMO BI SCREENING INCL CAD; Future    3. Arthritis of knee-we will see orthopedics for follow-up as needed. 4. Shoulder arthritis-stretching exercises. 5.  Palpitations-if she has exertional symptoms she will let me know. Provide a letter to excuse her from her gym membership. Advised her to call back or return to office if symptoms worsen/change/persist.  Discussed expected course/resolution/complications of diagnosis in detail with patient. Medication risks/benefits/costs/interactions/alternatives discussed with patient. She was given an after visit summary which includes diagnoses, current medications, & vitals. She expressed understanding with the diagnosis and plan.

## 2022-03-30 NOTE — LETTER
3/30/2022 8:40 AM    RE - Ms. yRan Scott  [address] [place] [address]    To Whom It May Concern -     Please excuse MsEmmett Stan Caceres from her current gym contract. Her needs cannot be met in the current environment.           Sincerely,      Tim Rosales MD

## 2022-03-31 ENCOUNTER — TELEPHONE (OUTPATIENT)
Dept: INTERNAL MEDICINE CLINIC | Age: 62
End: 2022-03-31

## 2022-03-31 NOTE — TELEPHONE ENCOUNTER
----- Message from Dar Gusman sent at 3/31/2022  2:01 PM EDT -----  Subject: Message to Provider    QUESTIONS  Information for Provider? Patient is calling to see if the letter she   needed to cancel her gym membership have been updated. She would like a   call back  ---------------------------------------------------------------------------  --------------  CALL BACK INFO  What is the best way for the office to contact you? OK to leave message on   voicemail  Preferred Call Back Phone Number? [phone]  ---------------------------------------------------------------------------  --------------  SCRIPT ANSWERS  Relationship to Patient?  Self

## 2022-05-14 DIAGNOSIS — R63.8 WEIGHT DISORDER: ICD-10-CM

## 2022-05-17 NOTE — TELEPHONE ENCOUNTER
Chief Complaint   Patient presents with    Medication Refill     Last Appointment with Dr. Shirley Correia:  3/30/2022  No future appointments.

## 2022-05-19 RX ORDER — PHENTERMINE HYDROCHLORIDE 37.5 MG/1
TABLET ORAL
Qty: 30 TABLET | Refills: 0 | Status: SHIPPED | OUTPATIENT
Start: 2022-05-19 | End: 2022-06-28

## 2022-06-28 DIAGNOSIS — R63.8 WEIGHT DISORDER: ICD-10-CM

## 2022-06-28 RX ORDER — PHENTERMINE HYDROCHLORIDE 37.5 MG/1
TABLET ORAL
Qty: 30 TABLET | Refills: 0 | Status: SHIPPED | OUTPATIENT
Start: 2022-06-28 | End: 2022-07-26

## 2022-07-25 DIAGNOSIS — R63.8 WEIGHT DISORDER: ICD-10-CM

## 2022-07-26 RX ORDER — PHENTERMINE HYDROCHLORIDE 37.5 MG/1
TABLET ORAL
Qty: 30 TABLET | Refills: 0 | Status: SHIPPED | OUTPATIENT
Start: 2022-07-26

## 2022-09-16 ENCOUNTER — TELEPHONE (OUTPATIENT)
Dept: INTERNAL MEDICINE CLINIC | Age: 62
End: 2022-09-16

## 2023-01-20 DIAGNOSIS — R63.8 WEIGHT DISORDER: ICD-10-CM

## 2023-01-20 RX ORDER — PHENTERMINE HYDROCHLORIDE 37.5 MG/1
TABLET ORAL
Qty: 30 TABLET | Refills: 0 | Status: SHIPPED | OUTPATIENT
Start: 2023-01-20

## 2023-01-31 ENCOUNTER — OFFICE VISIT (OUTPATIENT)
Dept: INTERNAL MEDICINE CLINIC | Age: 63
End: 2023-01-31
Payer: COMMERCIAL

## 2023-01-31 VITALS
RESPIRATION RATE: 18 BRPM | OXYGEN SATURATION: 97 % | HEART RATE: 90 BPM | TEMPERATURE: 97.6 F | DIASTOLIC BLOOD PRESSURE: 85 MMHG | WEIGHT: 194.8 LBS | HEIGHT: 64 IN | BODY MASS INDEX: 33.26 KG/M2 | SYSTOLIC BLOOD PRESSURE: 145 MMHG

## 2023-01-31 DIAGNOSIS — M21.612 BUNION OF GREAT TOE OF LEFT FOOT: ICD-10-CM

## 2023-01-31 DIAGNOSIS — R63.8 WEIGHT DISORDER: ICD-10-CM

## 2023-01-31 DIAGNOSIS — L27.0 ALLERGIC DRUG RASH: Primary | ICD-10-CM

## 2023-01-31 PROCEDURE — 99214 OFFICE O/P EST MOD 30 MIN: CPT | Performed by: INTERNAL MEDICINE

## 2023-01-31 RX ORDER — FLUOCINONIDE 0.5 MG/G
OINTMENT TOPICAL 2 TIMES DAILY
COMMUNITY
End: 2023-01-31 | Stop reason: ALTCHOICE

## 2023-01-31 RX ORDER — HYDROXYZINE 25 MG/1
25 TABLET, FILM COATED ORAL
COMMUNITY
End: 2023-01-31 | Stop reason: DRUGHIGH

## 2023-01-31 RX ORDER — CETIRIZINE HYDROCHLORIDE 10 MG/1
10 TABLET ORAL
Qty: 30 TABLET | Refills: 1 | Status: SHIPPED | OUTPATIENT
Start: 2023-01-31

## 2023-01-31 RX ORDER — FAMOTIDINE 20 MG/1
20 TABLET, FILM COATED ORAL 2 TIMES DAILY
Qty: 60 TABLET | Refills: 1 | Status: SHIPPED | OUTPATIENT
Start: 2023-01-31

## 2023-01-31 RX ORDER — CLOBETASOL PROPIONATE 0.5 MG/G
OINTMENT TOPICAL 2 TIMES DAILY
Qty: 30 G | Refills: 1 | Status: SHIPPED | OUTPATIENT
Start: 2023-01-31

## 2023-01-31 RX ORDER — HYDROXYZINE 50 MG/1
50 TABLET, FILM COATED ORAL
Qty: 60 TABLET | Refills: 1 | Status: SHIPPED | OUTPATIENT
Start: 2023-01-31 | End: 2023-02-10

## 2023-01-31 NOTE — PROGRESS NOTES
HPI:  Laina Santos is a 63y.o. year old female who is here for several issues. She has had a rash on her legs and her back that dates back to August.  She received her vaccine on August 7 and several days later she developed a diffuse rash. Its been itchy and uncomfortable on her legs and her back. She has seen dermatology as well as urgent care. She has been treated with oral antibiotics, oral steroids, multiple topical steroids, antihistamines, and most recently clobetasol and hydroxyzine. She had a biopsy of her left arm revealing spongiform dermatitis with eosinophils. The clobetasol seemed to work the best for helping but has not resolved the issue. They referred her to a rheumatologist and that appointment is pending. The itching is most notable at nighttime. No fevers or chills. No sweats. She does have some discomfort in her left great toe as well. She also is struggling with her weight despite phentermine. She is not taking any new medications currently. Past Medical History:   Diagnosis Date    Arthritis     Depression, major, recurrent, moderate ([address] 8/15/2019    Other specified dorsopathies, sacral and sacrococcygeal region (CODE)        Past Surgical History:   Procedure Laterality Date    HX COLONOSCOPY  1/15/15    Dr. Blanton Pass - 10 yr f/u    HX HYSTERECTOMY  2003    [address] PROCEDURES  2003    right shoulder    HX ORTHOPAEDIC  12/2009    bilateral knee replacement       Prior to Admission medications    Medication Sig Start Date End Date Taking? Authorizing Provider   cetirizine (ZYRTEC) 10 mg tablet Take 1 Tablet by mouth nightly. 1/31/23  Yes Leighton Cosby MD   famotidine (PEPCID) 20 mg tablet Take 1 Tablet by mouth two (2) times a day. 1/31/23  Yes Leighton Cosby MD   clobetasoL (TEMOVATE) 0.05 % ointment Apply  to affected area two (2) times a day.  1/31/23  Yes Leighton Cosby MD   hydrOXYzine HCL (ATARAX) 50 mg tablet Take 1 Tablet by mouth three (3) times daily as needed for Itching for up to 10 days. 1/31/23 2/10/23 Yes Stephy Antonio MD   camphor-menthoL RENETTA GLEASON [facility]) 0.5-0.5 % lotion Apply  to affected area as needed for Itching. 23  Yes Stephy Antonio MD   phentermine (ADIPEX-P) 37.5 mg tablet TAKE 1 TABLET BY MOUTH DAILY AS NEEDED 23  Yes Stephy Antonio MD   naproxen sodium 220 mg cap Take 440 mg by mouth as needed. Yes Provider, Historical   triamcinolone-dimeth-silicone 4.1-6 % kit by Apply Externally route two (2) times a day. 23  Provider, Historical   fluocinoNIDE (LIDEX) 0.05 % ointment Apply  to affected area two (2) times a day. 23  Provider, Historical   hydrOXYzine HCL (ATARAX) 25 mg tablet Take 25 mg by mouth three (3) times daily as needed. 23  Provider, Historical       Social History     Socioeconomic History    Marital status:      Spouse name: Not on file    Number of children: Not on file    Years of education: Not on file    Highest education level: Not on file   Occupational History    Not on file   Tobacco Use    Smoking status: Former     Years: 2.00     Types: Cigarettes     Quit date:      Years since quittin.0    Smokeless tobacco: Never   Vaping Use    Vaping Use: Never used   Substance and Sexual Activity    Alcohol use:  Yes     Alcohol/week: 3.0 standard drinks     Types: 3 Standard drinks or equivalent per week    Drug use: No    Sexual activity: Yes     Partners: Male     Birth control/protection: Surgical   Other Topics Concern    Not on file   Social History Narrative    Not on file     Social Determinants of Health     Financial Resource Strain: Medium Risk    Difficulty of Paying Living Expenses: Somewhat hard   Food Insecurity: Food Insecurity Present    Worried About Running Out of Food in the Last Year: Sometimes true    Ran Out of Food in the Last Year: Sometimes true   Transportation Needs: Not on file   Physical Activity: Not on file   Stress: Not on file   Social Connections: Not on file   Intimate Partner Violence: Not on file   Housing Stability: Not on file          ROS  Per HPI    Visit Vitals  BP (!) 145/85   Pulse 90   Temp 97.6 °F (36.4 °C) (Temporal)   Resp 18   Ht 5' 4\" (1.626 m)   Wt 194 lb 12.8 oz (88.4 kg)   SpO2 97%   BMI 33.44 kg/m²         Physical Exam   Physical Examination: General appearance - alert, well appearing, and in no distress  Chest - clear to auscultation, no wheezes, rales or rhonchi, symmetric air entry  Heart - normal rate, regular rhythm, normal S1, S2, no murmurs, rubs, clicks or gallops  Musculoskeletal - abnormal exam of left foot with a small bunion deformity. Small left second toe hammertoe deformity. Extremities - peripheral pulses normal, no pedal edema, no clubbing or cyanosis  Skin -multiple hyper pigmented excoriated lesions over anterior shins with some ulcerated lesions. Several over the arms as well. Assessment/Plan:  Diagnoses and all orders for this visit:    1. Allergic drug rash- At this point we will add Pepcid 20 mg twice a day, Zyrtec 10 mg a day, hydroxyzine at higher doses than she took in the past, and continue clobetasol. If unsuccessful, will see rheumatology for follow-up. 2. Bunion of great toe of left foot-reassurance. If painful she will let me know. 3. Weight disorder-referral for bariatric surgery consideration. Other orders  -     cetirizine (ZYRTEC) 10 mg tablet; Take 1 Tablet by mouth nightly. -     famotidine (PEPCID) 20 mg tablet; Take 1 Tablet by mouth two (2) times a day. -     clobetasoL (TEMOVATE) 0.05 % ointment; Apply  to affected area two (2) times a day. -     hydrOXYzine HCL (ATARAX) 50 mg tablet; Take 1 Tablet by mouth three (3) times daily as needed for Itching for up to 10 days. Advised her to call back or return to office if symptoms worsen/change/persist.  Discussed expected course/resolution/complications of diagnosis in detail with patient.     Medication risks/benefits/costs/interactions/alternatives discussed with patient. She was given an after visit summary which includes diagnoses, current medications, & vitals. She expressed understanding with the diagnosis and plan.

## 2023-01-31 NOTE — PROGRESS NOTES
Chief Complaint   Patient presents with    Leg Swelling    Rash     Both legs, swelling,both arms, lower and middle part of back    Foot Pain     Left toe     Blood pressure (!) 145/85, pulse 90, temperature 97.6 °F (36.4 °C), temperature source Temporal, resp. rate 18, height 5' 4\" (1.626 m), weight 194 lb 12.8 oz (88.4 kg), SpO2 97 %.

## 2023-02-09 ENCOUNTER — APPOINTMENT (OUTPATIENT)
Dept: GENERAL RADIOLOGY | Age: 63
End: 2023-02-09
Attending: NURSE PRACTITIONER
Payer: COMMERCIAL

## 2023-02-09 ENCOUNTER — HOSPITAL ENCOUNTER (INPATIENT)
Age: 63
LOS: 2 days | Discharge: HOME | End: 2023-02-11
Attending: STUDENT IN AN ORGANIZED HEALTH CARE EDUCATION/TRAINING PROGRAM | Admitting: STUDENT IN AN ORGANIZED HEALTH CARE EDUCATION/TRAINING PROGRAM
Payer: COMMERCIAL

## 2023-02-09 DIAGNOSIS — J18.9 PNEUMONIA OF BOTH LUNGS DUE TO INFECTIOUS ORGANISM, UNSPECIFIED PART OF LUNG: Primary | ICD-10-CM

## 2023-02-09 LAB
ALBUMIN SERPL-MCNC: 3.3 G/DL (ref 3.5–5)
ALBUMIN/GLOB SERPL: 0.8 (ref 1.1–2.2)
ALP SERPL-CCNC: 65 U/L (ref 45–117)
ALT SERPL-CCNC: 28 U/L (ref 12–78)
ANION GAP SERPL CALC-SCNC: 11 MMOL/L (ref 5–15)
APPEARANCE UR: ABNORMAL
AST SERPL-CCNC: 43 U/L (ref 15–37)
ATRIAL RATE: 96 BPM
BACTERIA URNS QL MICRO: NEGATIVE /HPF
BASOPHILS # BLD: 0 K/UL (ref 0–0.1)
BASOPHILS NFR BLD: 0 % (ref 0–1)
BILIRUB SERPL-MCNC: 0.5 MG/DL (ref 0.2–1)
BILIRUB UR QL: NEGATIVE
BNP SERPL-MCNC: 38 PG/ML (ref 0–125)
BUN SERPL-MCNC: 19 MG/DL (ref 6–20)
BUN/CREAT SERPL: 16 (ref 12–20)
CALCIUM SERPL-MCNC: 8.4 MG/DL (ref 8.5–10.1)
CALCULATED P AXIS, ECG09: 57 DEGREES
CALCULATED R AXIS, ECG10: 30 DEGREES
CALCULATED T AXIS, ECG11: 24 DEGREES
CHLORIDE SERPL-SCNC: 102 MMOL/L (ref 97–108)
CO2 SERPL-SCNC: 23 MMOL/L (ref 21–32)
COLOR UR: ABNORMAL
CREAT SERPL-MCNC: 1.22 MG/DL (ref 0.55–1.02)
DEPRECATED S PYO AG THROAT QL EIA: NEGATIVE
DIAGNOSIS, 93000: NORMAL
DIFFERENTIAL METHOD BLD: ABNORMAL
EOSINOPHIL # BLD: 0 K/UL (ref 0–0.4)
EOSINOPHIL NFR BLD: 0 % (ref 0–7)
EPITH CASTS URNS QL MICRO: ABNORMAL /LPF
ERYTHROCYTE [DISTWIDTH] IN BLOOD BY AUTOMATED COUNT: 17.2 % (ref 11.5–14.5)
FLUAV RNA SPEC QL NAA+PROBE: NOT DETECTED
FLUBV RNA SPEC QL NAA+PROBE: NOT DETECTED
GLOBULIN SER CALC-MCNC: 4.1 G/DL (ref 2–4)
GLUCOSE BLD STRIP.AUTO-MCNC: 85 MG/DL (ref 65–117)
GLUCOSE SERPL-MCNC: 108 MG/DL (ref 65–100)
GLUCOSE UR STRIP.AUTO-MCNC: NEGATIVE MG/DL
HCT VFR BLD AUTO: 42.1 % (ref 35–47)
HGB BLD-MCNC: 13.2 G/DL (ref 11.5–16)
HGB UR QL STRIP: ABNORMAL
IMM GRANULOCYTES # BLD AUTO: 0 K/UL
IMM GRANULOCYTES NFR BLD AUTO: 0 %
KETONES UR QL STRIP.AUTO: 40 MG/DL
LEUKOCYTE ESTERASE UR QL STRIP.AUTO: ABNORMAL
LYMPHOCYTES # BLD: 1.2 K/UL (ref 0.8–3.5)
LYMPHOCYTES NFR BLD: 20 % (ref 12–49)
MCH RBC QN AUTO: 21.9 PG (ref 26–34)
MCHC RBC AUTO-ENTMCNC: 31.4 G/DL (ref 30–36.5)
MCV RBC AUTO: 69.9 FL (ref 80–99)
MONOCYTES # BLD: 0.5 K/UL (ref 0–1)
MONOCYTES NFR BLD: 8 % (ref 5–13)
NEUTS BAND NFR BLD MANUAL: 11 % (ref 0–6)
NEUTS SEG # BLD: 4.3 K/UL (ref 1.8–8)
NEUTS SEG NFR BLD: 61 % (ref 32–75)
NITRITE UR QL STRIP.AUTO: NEGATIVE
NRBC # BLD: 0 K/UL (ref 0–0.01)
NRBC BLD-RTO: 0 PER 100 WBC
P-R INTERVAL, ECG05: 128 MS
PH UR STRIP: 5.5 (ref 5–8)
PLATELET # BLD AUTO: 188 K/UL (ref 150–400)
PMV BLD AUTO: 10.9 FL (ref 8.9–12.9)
POTASSIUM SERPL-SCNC: 3.6 MMOL/L (ref 3.5–5.1)
PROT SERPL-MCNC: 7.4 G/DL (ref 6.4–8.2)
PROT UR STRIP-MCNC: 30 MG/DL
Q-T INTERVAL, ECG07: 356 MS
QRS DURATION, ECG06: 76 MS
QTC CALCULATION (BEZET), ECG08: 449 MS
RBC # BLD AUTO: 6.02 M/UL (ref 3.8–5.2)
RBC #/AREA URNS HPF: ABNORMAL /HPF (ref 0–5)
RBC MORPH BLD: ABNORMAL
SARS-COV-2 RNA RESP QL NAA+PROBE: NOT DETECTED
SERVICE CMNT-IMP: NORMAL
SODIUM SERPL-SCNC: 136 MMOL/L (ref 136–145)
SP GR UR REFRACTOMETRY: 1.02
TROPONIN I SERPL HS-MCNC: 11 NG/L (ref 0–51)
TROPONIN I SERPL HS-MCNC: 11 NG/L (ref 0–51)
UA: UC IF INDICATED,UAUC: ABNORMAL
UROBILINOGEN UR QL STRIP.AUTO: 1 EU/DL (ref 0.2–1)
VENTRICULAR RATE, ECG03: 96 BPM
WBC # BLD AUTO: 6 K/UL (ref 3.6–11)
WBC URNS QL MICRO: ABNORMAL /HPF (ref 0–4)

## 2023-02-09 PROCEDURE — 87186 SC STD MICRODIL/AGAR DIL: CPT

## 2023-02-09 PROCEDURE — 96376 TX/PRO/DX INJ SAME DRUG ADON: CPT

## 2023-02-09 PROCEDURE — 85025 COMPLETE CBC W/AUTO DIFF WBC: CPT

## 2023-02-09 PROCEDURE — 74011000250 HC RX REV CODE- 250: Performed by: NURSE PRACTITIONER

## 2023-02-09 PROCEDURE — 80053 COMPREHEN METABOLIC PANEL: CPT

## 2023-02-09 PROCEDURE — 96375 TX/PRO/DX INJ NEW DRUG ADDON: CPT

## 2023-02-09 PROCEDURE — 99285 EMERGENCY DEPT VISIT HI MDM: CPT

## 2023-02-09 PROCEDURE — 74011250637 HC RX REV CODE- 250/637: Performed by: STUDENT IN AN ORGANIZED HEALTH CARE EDUCATION/TRAINING PROGRAM

## 2023-02-09 PROCEDURE — 77030029684 HC NEB SM VOL KT MONA -A

## 2023-02-09 PROCEDURE — 81001 URINALYSIS AUTO W/SCOPE: CPT

## 2023-02-09 PROCEDURE — 87070 CULTURE OTHR SPECIMN AEROBIC: CPT

## 2023-02-09 PROCEDURE — 96365 THER/PROPH/DIAG IV INF INIT: CPT

## 2023-02-09 PROCEDURE — G0378 HOSPITAL OBSERVATION PER HR: HCPCS

## 2023-02-09 PROCEDURE — 87636 SARSCOV2 & INF A&B AMP PRB: CPT

## 2023-02-09 PROCEDURE — 82962 GLUCOSE BLOOD TEST: CPT

## 2023-02-09 PROCEDURE — 96374 THER/PROPH/DIAG INJ IV PUSH: CPT

## 2023-02-09 PROCEDURE — 74011250636 HC RX REV CODE- 250/636: Performed by: NURSE PRACTITIONER

## 2023-02-09 PROCEDURE — 74011250637 HC RX REV CODE- 250/637: Performed by: NURSE PRACTITIONER

## 2023-02-09 PROCEDURE — 65270000032 HC RM SEMIPRIVATE

## 2023-02-09 PROCEDURE — 87086 URINE CULTURE/COLONY COUNT: CPT

## 2023-02-09 PROCEDURE — 36415 COLL VENOUS BLD VENIPUNCTURE: CPT

## 2023-02-09 PROCEDURE — 87449 NOS EACH ORGANISM AG IA: CPT

## 2023-02-09 PROCEDURE — 94640 AIRWAY INHALATION TREATMENT: CPT

## 2023-02-09 PROCEDURE — 87880 STREP A ASSAY W/OPTIC: CPT

## 2023-02-09 PROCEDURE — 94760 N-INVAS EAR/PLS OXIMETRY 1: CPT

## 2023-02-09 PROCEDURE — 87899 AGENT NOS ASSAY W/OPTIC: CPT

## 2023-02-09 PROCEDURE — 84484 ASSAY OF TROPONIN QUANT: CPT

## 2023-02-09 PROCEDURE — 74011250636 HC RX REV CODE- 250/636: Performed by: STUDENT IN AN ORGANIZED HEALTH CARE EDUCATION/TRAINING PROGRAM

## 2023-02-09 PROCEDURE — 71045 X-RAY EXAM CHEST 1 VIEW: CPT

## 2023-02-09 PROCEDURE — 93005 ELECTROCARDIOGRAM TRACING: CPT

## 2023-02-09 PROCEDURE — 74011000250 HC RX REV CODE- 250: Performed by: STUDENT IN AN ORGANIZED HEALTH CARE EDUCATION/TRAINING PROGRAM

## 2023-02-09 PROCEDURE — 83880 ASSAY OF NATRIURETIC PEPTIDE: CPT

## 2023-02-09 PROCEDURE — 87077 CULTURE AEROBIC IDENTIFY: CPT

## 2023-02-09 RX ORDER — SODIUM CHLORIDE 9 MG/ML
100 INJECTION, SOLUTION INTRAVENOUS CONTINUOUS
Status: DISPENSED | OUTPATIENT
Start: 2023-02-09 | End: 2023-02-10

## 2023-02-09 RX ORDER — AZITHROMYCIN 250 MG/1
500 TABLET, FILM COATED ORAL DAILY
Status: DISCONTINUED | OUTPATIENT
Start: 2023-02-10 | End: 2023-02-11 | Stop reason: HOSPADM

## 2023-02-09 RX ORDER — POLYETHYLENE GLYCOL 3350 17 G/17G
17 POWDER, FOR SOLUTION ORAL DAILY PRN
Status: DISCONTINUED | OUTPATIENT
Start: 2023-02-09 | End: 2023-02-11 | Stop reason: HOSPADM

## 2023-02-09 RX ORDER — ACETAMINOPHEN 325 MG/1
650 TABLET ORAL
Status: DISCONTINUED | OUTPATIENT
Start: 2023-02-09 | End: 2023-02-11 | Stop reason: HOSPADM

## 2023-02-09 RX ORDER — ENOXAPARIN SODIUM 100 MG/ML
40 INJECTION SUBCUTANEOUS DAILY
Status: DISCONTINUED | OUTPATIENT
Start: 2023-02-10 | End: 2023-02-11 | Stop reason: HOSPADM

## 2023-02-09 RX ORDER — DEXAMETHASONE SODIUM PHOSPHATE 10 MG/ML
10 INJECTION INTRAMUSCULAR; INTRAVENOUS
Status: COMPLETED | OUTPATIENT
Start: 2023-02-09 | End: 2023-02-09

## 2023-02-09 RX ORDER — SODIUM CHLORIDE 0.9 % (FLUSH) 0.9 %
5-40 SYRINGE (ML) INJECTION EVERY 8 HOURS
Status: DISCONTINUED | OUTPATIENT
Start: 2023-02-09 | End: 2023-02-11 | Stop reason: HOSPADM

## 2023-02-09 RX ORDER — ONDANSETRON 2 MG/ML
4 INJECTION INTRAMUSCULAR; INTRAVENOUS
Status: DISCONTINUED | OUTPATIENT
Start: 2023-02-09 | End: 2023-02-11 | Stop reason: HOSPADM

## 2023-02-09 RX ORDER — ACETAMINOPHEN 650 MG/1
650 SUPPOSITORY RECTAL
Status: DISCONTINUED | OUTPATIENT
Start: 2023-02-09 | End: 2023-02-11 | Stop reason: HOSPADM

## 2023-02-09 RX ORDER — DEXAMETHASONE SODIUM PHOSPHATE 4 MG/ML
10 INJECTION, SOLUTION INTRA-ARTICULAR; INTRALESIONAL; INTRAMUSCULAR; INTRAVENOUS; SOFT TISSUE
Status: DISCONTINUED | OUTPATIENT
Start: 2023-02-09 | End: 2023-02-09

## 2023-02-09 RX ORDER — IPRATROPIUM BROMIDE AND ALBUTEROL SULFATE 2.5; .5 MG/3ML; MG/3ML
3 SOLUTION RESPIRATORY (INHALATION)
Status: COMPLETED | OUTPATIENT
Start: 2023-02-09 | End: 2023-02-09

## 2023-02-09 RX ORDER — ONDANSETRON 2 MG/ML
4 INJECTION INTRAMUSCULAR; INTRAVENOUS ONCE
Status: COMPLETED | OUTPATIENT
Start: 2023-02-09 | End: 2023-02-09

## 2023-02-09 RX ORDER — SODIUM CHLORIDE 0.9 % (FLUSH) 0.9 %
5-40 SYRINGE (ML) INJECTION AS NEEDED
Status: DISCONTINUED | OUTPATIENT
Start: 2023-02-09 | End: 2023-02-11 | Stop reason: HOSPADM

## 2023-02-09 RX ORDER — AZITHROMYCIN 500 MG/1
500 TABLET, FILM COATED ORAL DAILY
Status: DISCONTINUED | OUTPATIENT
Start: 2023-02-10 | End: 2023-02-09

## 2023-02-09 RX ORDER — DOXYCYCLINE HYCLATE 100 MG
100 TABLET ORAL
Status: COMPLETED | OUTPATIENT
Start: 2023-02-09 | End: 2023-02-09

## 2023-02-09 RX ORDER — IPRATROPIUM BROMIDE AND ALBUTEROL SULFATE 2.5; .5 MG/3ML; MG/3ML
3 SOLUTION RESPIRATORY (INHALATION) EVERY 12 HOURS
Status: DISCONTINUED | OUTPATIENT
Start: 2023-02-09 | End: 2023-02-11 | Stop reason: HOSPADM

## 2023-02-09 RX ORDER — GUAIFENESIN/DEXTROMETHORPHAN 100-10MG/5
10 SYRUP ORAL
Status: DISCONTINUED | OUTPATIENT
Start: 2023-02-09 | End: 2023-02-11 | Stop reason: HOSPADM

## 2023-02-09 RX ORDER — ONDANSETRON 4 MG/1
4 TABLET, ORALLY DISINTEGRATING ORAL
Status: DISCONTINUED | OUTPATIENT
Start: 2023-02-09 | End: 2023-02-11 | Stop reason: HOSPADM

## 2023-02-09 RX ADMIN — SODIUM CHLORIDE 100 ML/HR: 9 INJECTION, SOLUTION INTRAVENOUS at 15:08

## 2023-02-09 RX ADMIN — IPRATROPIUM BROMIDE AND ALBUTEROL SULFATE 3 ML: 2.5; .5 SOLUTION RESPIRATORY (INHALATION) at 21:09

## 2023-02-09 RX ADMIN — DEXAMETHASONE SODIUM PHOSPHATE 10 MG: 10 INJECTION INTRAMUSCULAR; INTRAVENOUS at 11:46

## 2023-02-09 RX ADMIN — SODIUM CHLORIDE 1000 ML: 9 INJECTION, SOLUTION INTRAVENOUS at 11:47

## 2023-02-09 RX ADMIN — CEFTRIAXONE SODIUM 1 G: 1 INJECTION, POWDER, FOR SOLUTION INTRAMUSCULAR; INTRAVENOUS at 15:07

## 2023-02-09 RX ADMIN — GUAIFENESIN AND DEXTROMETHORPHAN 10 ML: 100; 10 SYRUP ORAL at 21:20

## 2023-02-09 RX ADMIN — AZITHROMYCIN 500 MG: 500 INJECTION, POWDER, LYOPHILIZED, FOR SOLUTION INTRAVENOUS at 11:49

## 2023-02-09 RX ADMIN — ONDANSETRON 4 MG: 2 INJECTION INTRAMUSCULAR; INTRAVENOUS at 13:00

## 2023-02-09 RX ADMIN — SODIUM CHLORIDE, PRESERVATIVE FREE 10 ML: 5 INJECTION INTRAVENOUS at 15:08

## 2023-02-09 RX ADMIN — GUAIFENESIN AND DEXTROMETHORPHAN 10 ML: 100; 10 SYRUP ORAL at 15:08

## 2023-02-09 RX ADMIN — DOXYCYCLINE HYCLATE 100 MG: 100 TABLET, COATED ORAL at 11:48

## 2023-02-09 RX ADMIN — SODIUM CHLORIDE, PRESERVATIVE FREE 20 ML: 5 INJECTION INTRAVENOUS at 21:20

## 2023-02-09 RX ADMIN — SODIUM CHLORIDE 100 ML/HR: 9 INJECTION, SOLUTION INTRAVENOUS at 19:15

## 2023-02-09 RX ADMIN — IPRATROPIUM BROMIDE AND ALBUTEROL SULFATE 3 ML: 2.5; .5 SOLUTION RESPIRATORY (INHALATION) at 11:59

## 2023-02-09 NOTE — PROGRESS NOTES
[id number]) .. TRANSFER - IN REPORT:    Verbal report received from STAR Kim(name) on [employer]  being received from ED(unit) for routine progression of care      Report consisted of patients Situation, Background, Assessment and   Recommendations(SBAR). Information from the following report(s) SBAR, Kardex, MAR, Accordion, Recent Results, and Cardiac Rhythm ST  was reviewed with the receiving nurse. Opportunity for questions and clarification was provided. Assessment completed upon patients arrival to unit and care assumed.

## 2023-02-09 NOTE — ED NOTES
Patient presents to ED for c/o syncopal episode when getting out of the shower. Patient states she has felt sick the last few days with cough and malaise. 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 Patient denies nausea, vomiting, diarrhea, dark stools, chest pain, or shortness of breath. Patient alert and oriented x4, respirations even and unlabored, skin warm dry and intact, NAD. Emergency Department Nursing Plan of Care       The Nursing Plan of Care is developed from the Nursing assessment and Emergency Department Attending provider initial evaluation. The plan of care may be reviewed in the ED Provider note.     The Plan of Care was developed with the following considerations:   Patient / Family readiness to learn indicated by:verbalized understanding, successful return demonstration and appropriate questions asked  Persons(s) to be included in education: patient  Barriers to Learning/Limitations:No    Signed     Harika Craven RN    2/9/2023   10:24 AM

## 2023-02-09 NOTE — H&P
History and Physical    Date of Service:  2/9/2023  Primary Care Provider: Carlos Ashraf MD  Source of information: Syncopal episode      Chief Complaint: Dizziness      History of Presenting Illness:   Shubham Rodriguez is a 63 y.o. female who presents with . Syncopal episode. Patient states that she works at a [employer] and is around people who have been coughing lately. Patient endorsed shortness of breath productive cough generalized weakness and episode of passing out and. Denies any chest pain belly pain difficulty with bowel or bladder    The patient denies any headache, blurry vision, sore throat, trouble swallowing, trouble with speech, chest pain, , fever, chills, N/V/D, abd pain, urinary symptoms, constipation, recent travels, sick contacts, focal or generalized neurological symptoms, falls, injuries, rashes, contact with COVID-19 diagnosed patients, hematemesis, melena, hemoptysis, hematuria, rashes, denies starting any new medications and denies any other concerns or problems besides as mentioned above. REVIEW OF SYSTEMS:  A comprehensive review of systems was negative except for: Constitutional: positive for chills, sweats, fatigue, and malaise  Respiratory: positive for cough, pneumonia, or dyspnea on exertion  Cardiovascular: positive for syncope     Past Medical History:   Diagnosis Date    Arthritis     Depression, major, recurrent, moderate ([address] 8/15/2019    Other specified dorsopathies, sacral and sacrococcygeal region (CODE)       Past Surgical History:   Procedure Laterality Date    HX COLONOSCOPY  1/15/15    Dr. Latasha Ames - 10 yr f/u    HX HYSTERECTOMY  2003    [address] PROCEDURES  2003    right shoulder    HX ORTHOPAEDIC  12/2009    bilateral knee replacement     Prior to Admission medications    Medication Sig Start Date End Date Taking? Authorizing Provider   cetirizine (ZYRTEC) 10 mg tablet Take 1 Tablet by mouth nightly.  1/31/23   Carlos Ashraf MD   famotidine (PEPCID) 20 mg tablet Take 1 Tablet by mouth two (2) times a day. 1/31/23   Alex Posada III, MD   clobetasoL (TEMOVATE) 0.05 % ointment Apply  to affected area two (2) times a day. 1/31/23   Alex Posada III, MD   hydrOXYzine HCL (ATARAX) 50 mg tablet Take 1 Tablet by mouth three (3) times daily as needed for Itching for up to 10 days. 1/31/23 2/10/23  Stefani García MD   camphor-menthoL RENETTA GLEASON [facility]) 0.5-0.5 % lotion Apply  to affected area as needed for Itching. 1/31/23   Alex Posada III, MD   phentermine (ADIPEX-P) 37.5 mg tablet TAKE 1 TABLET BY MOUTH DAILY AS NEEDED 1/20/23   Alex Posada III, MD   naproxen sodium 220 mg cap Take 440 mg by mouth as needed. Provider, Historical     Allergies   Allergen Reactions    Demerol [Meperidine] Itching    Penicillin G Anaphylaxis      Family History   Adopted: Yes   Problem Relation Age of Onset    Breast Cancer Mother 45        Pt. adopted but family member shared this info      Social History:  reports that she quit smoking about 23 years ago. Her smoking use included cigarettes. She has never used smokeless tobacco. She reports current alcohol use of about 3.0 standard drinks per week. She reports that she does not use drugs.    Social Determinants of Health     Tobacco Use: Medium Risk    Smoking Tobacco Use: Former    Smokeless Tobacco Use: Never    Passive Exposure: Not on file   Alcohol Use: Not on file   Financial Resource Strain: Medium Risk    Difficulty of Paying Living Expenses: Somewhat hard   Food Insecurity: Food Insecurity Present    Worried About Running Out of Food in the Last Year: Sometimes true    Ran Out of Food in the Last Year: Sometimes true   Transportation Needs: Not on file   Physical Activity: Not on file   Stress: Not on file   Social Connections: Not on file   Intimate Partner Violence: Not on file   Depression: Not at risk    PHQ-2 Score: 0   Housing Stability: Not on file        Medications were reconciled to the best of my ability given all available resources at the time of admission. Route is PO if not otherwise noted. Family and social history were personally reviewed, all pertinent and relevant details are outlined as above.     Objective:   Visit Vitals  BP (!) 127/59 (BP 1 Location: Left upper arm, BP Patient Position: At rest)   Pulse 91   Temp 98.8 °F (37.1 °C)   Resp 16   Ht 5' 3\" (1.6 m)   Wt 85.8 kg (189 lb 1.6 oz)   SpO2 96%   BMI 33.50 kg/m²      O2 Device: None (Room air)    PHYSICAL EXAM:   General: Alert x oriented x 3, awake, no acute distress,   HEENT: PEERL, EOMI, moist mucus membranes  Neck: Supple, no JVD, no meningeal signs  Chest: Clear to auscultation bilaterally   CVS: RRR, S1 S2 heard, no murmurs/rubs/gallops  Abd: Soft, non-tender, non-distended, +bowel sounds   Ext: No clubbing, no cyanosis, no edema  Neuro/Psych: Pleasant mood and affect, CN 2-12 grossly intact, sensory grossly within normal limit, Strength 5/5 in all extremities, DTR 1+ x 4  Cap refill: Brisk, less than 3 seconds  Pulses: 2+, symmetric in all extremities  Skin: Warm, dry, without rashes or lesions    Data Review:   I have independently reviewed and interpreted patient's lab and all other diagnostic data    Abnormal Labs Reviewed   METABOLIC PANEL, COMPREHENSIVE - Abnormal; Notable for the following components:       Result Value    Glucose 108 (*)     Creatinine 1.22 (*)     eGFR 51 (*)     Calcium 8.4 (*)     AST (SGOT) 43 (*)     Albumin 3.3 (*)     Globulin 4.1 (*)     A-G Ratio 0.8 (*)     All other components within normal limits   CBC WITH AUTOMATED DIFF - Abnormal; Notable for the following components:    RBC 6.02 (*)     MCV 69.9 (*)     MCH 21.9 (*)     RDW 17.2 (*)     BAND NEUTROPHILS 11 (*)     All other components within normal limits   URINALYSIS W/ REFLEX CULTURE - Abnormal; Notable for the following components:    Appearance CLOUDY (*)     Protein 30 (*)     Ketone 40 (*)     Blood TRACE (*)     Leukocyte Esterase MODERATE (*)     UA:UC IF INDICATED URINE CULTURE ORDERED (*)     All other components within normal limits       All Micro Results       Procedure Component Value Units Date/Time    CULTURE, RESPIRATORY/SPUTUM/BRONCH Flaco Gambino [[id number]]     Order Status: Sent Specimen: Sputum     ROSIBEL [place], UR/CSF [[id number]]     Order Status: Sent     LEGIONELLA PNEUMOPHILA Taylor Sandra URINE [[id number]]     Order Status: Sent Specimen: Urine     CULTURE, URINE [[id number]] Collected: 02/09/23 1307    Order Status: No result Updated: 02/09/23 1319    COVID-19 WITH INFLUENZA A/B [[id number]] Collected: 02/09/23 1030    Order Status: Completed Specimen: Nasopharyngeal Updated: 02/09/23 1114     SARS-CoV-2 by PCR Not detected        Comment: Not Detected results do not preclude SARS-CoV-2 infection and should not be used as the sole basis for patient management decisions. Results must be combined with clinical observations, patient history, and epidemiological information. Influenza A by PCR Not detected        Influenza B by PCR Not detected        Comment: Testing was performed using lu Shannan SARS-CoV-2 and Influenza A/B nucleic acid assay. This test is a multiplex Real-Time Reverse Transcriptase Polymerase Chain Reaction (RT-PCR) based in vitro diagnostic test intended for the qualitative detection of nucleic acids from SARS-CoV-2, Influenza A, and Influenza B in nasopharyngeal and nasal swab specimens for use under the FDA's Emergency Use Authorization (EUA) only.        Fact sheet for Patients: FindDrives.pl  Fact sheet for Healthcare Providers: FindDrives.pl                 IMAGING:   XR CHEST PORT   Final Result      Multilobar pneumonia              ECG/ECHO:    Results for orders placed or performed during the hospital encounter of 02/09/23   EKG, 12 LEAD, INITIAL   Result Value Ref Range    Ventricular Rate 96 BPM    Atrial Rate 96 BPM    P-R Interval 128 ms    QRS Duration 76 ms    Q-T Interval 356 ms    QTC Calculation (Bezet) 449 ms    Calculated P Axis 57 degrees    Calculated R Axis 30 degrees    Calculated T Axis 24 degrees    Diagnosis       Normal sinus rhythm  Possible Left atrial enlargement  Borderline ECG  When compared with ECG of 31-MAY-2010 00:29,  No significant change was found  Confirmed by Faustino Xavier M.D., [facility] ([id number]) on 2/9/2023 10:41:32 AM            Notes reviewed from all clinical/nonclinical/nursing services involved in patient's clinical care. Care coordination discussions were held with appropriate clinical/nonclinical/ nursing providers based on care coordination needs. Assessment and Plan :   Given the patient's current clinical presentation, there is a high level of concern for decompensation if discharged from the emergency department. Complex decision making was performed, which includes reviewing the patient's available past medical records, laboratory results, and imaging studies. Active Problems:    Pneumonia (2/9/2023)            #Community-acquired pneumonia POA  #Sepsis due to above POA  #Productive cough due to above  #Syncopal episode due to above  -On admission heart rate 101 respiratory rate 30  -Chest x-ray with multilobar pneumonia  -COVID-19 influenza negative    -Ceftriaxone azithromycin  -Sputum culture  -Check Legionella and strep pneumo antigen  -Antitussive as needed  -PT OT and speech    #UTI  -UA with moderate leukoesterase WBC urine culture pending  -On IV antibiotics  -Follow culture data    #History of anxiety  -Resume home meds once confirmed by pharmacy      DIET: ADULT DIET Regular; dislike pork   ISOLATION PRECAUTIONS: There are currently no Active Isolations  CODE STATUS: Full Code   DVT PROPHYLAXIS: Lovenox  FUNCTIONAL STATUS PRIOR TO HOSPITALIZATION: Fully active and ambulatory; able to carry on all self-care without restriction.   Ambulatory status/function: Ambulates with assistance:  Gus ASSESSMENT: Recommend an assessment from physical therapy and/or occupational therapy  ANTICIPATED DISCHARGE: 24-48 hours. ANTICIPATED DISPOSITION: Home  EMERGENCY CONTACT/SURROGATE DECISION MAKER: None    CRITICAL CARE WAS PERFORMED FOR THIS ENCOUNTER: NO.      Signed By: Brii Randhawa MD     February 9, 2023         Please note that this dictation may have been completed with Dragon, the computer voice recognition software. Quite often unanticipated grammatical, syntax, homophones, and other interpretive errors are inadvertently transcribed by the computer software. Please disregard these errors. Please excuse any errors that have escaped final proofreading.

## 2023-02-09 NOTE — ED TRIAGE NOTES
Patient arrives to ED for c/o syncopal episode this morning in the shower. Patient has been c/o cough and malaise x 2 days. Patient states in shower and woke up on the floor with loss of bowel function. Patient currently alert and oriented x4.

## 2023-02-09 NOTE — ED PROVIDER NOTES
[address]       Pt Name: Gunner Hogan  MRN: [id number]  [place] 1960  Date of evaluation: 2/9/2023  Provider: Heath Pérez NP   PCP: Juan A Bales MD  Note Started: 11:17 AM 2/9/23     CHIEF COMPLAINT       Chief Complaint   Patient presents with    Dizziness        HISTORY OF PRESENT ILLNESS: 1 or more elements      History From: Patient and EMS  HPI Limitations : None     Gunner Hogan is a 63 y.o. female who presents with dizziness. Patient reports taking a shower and awoke on the floor of the bathtub with a shower curtain wrapped around her. Patient states she has not been feeling well for 4 days that consist of cough and decreased appetite. She reports her symptoms worsening today with fatigue and dizziness. She reports decreased appetite but no nausea, vomiting, abdominal pain. She reports working at [employer] shows exposure to [employer] or influenza. She is not taking anything for her symptoms. Nursing Notes were all reviewed and agreed with or any disagreements were addressed in the HPI. REVIEW OF SYSTEMS      Review of Systems   Constitutional:  Negative for appetite change, chills, fatigue and fever. HENT:  Negative for congestion, ear pain, rhinorrhea and sore throat. Eyes:  Negative for pain and itching. Respiratory:  Positive for cough. Negative for chest tightness, shortness of breath and wheezing. Cardiovascular:  Negative for chest pain, palpitations and leg swelling. Gastrointestinal:  Negative for abdominal pain, nausea and vomiting. Genitourinary:  Negative for dysuria, frequency and urgency. Musculoskeletal:  Negative for arthralgias, back pain and joint swelling. Skin:  Negative for color change and rash. Neurological:  Positive for dizziness and syncope. Negative for seizures, facial asymmetry, speech difficulty, weakness, numbness and headaches. All other systems reviewed and are negative.      Positives and Pertinent negatives as per HPI. PAST HISTORY     Past Medical History:  Past Medical History:   Diagnosis Date    Arthritis     Depression, major, recurrent, moderate ([facility] [address] 8/15/2019    Other specified dorsopathies, sacral and sacrococcygeal region (CODE)        Past Surgical History:  Past Surgical History:   Procedure Laterality Date    HX COLONOSCOPY  1/15/15    Dr. Clover Meyer - 8 yr f/u    HX HYSTERECTOMY      HX MOHS PROCEDURES      right shoulder    HX ORTHOPAEDIC  2009    bilateral knee replacement       Family History:  Family History   Adopted: Yes   Problem Relation Age of Onset    Breast Cancer Mother 45        Pt. adopted but family member shared this info       Social History:  Social History     Tobacco Use    Smoking status: Former     Years: 2.00     Types: Cigarettes     Quit date:      Years since quittin.1    Smokeless tobacco: Never   Vaping Use    Vaping Use: Never used   Substance Use Topics    Alcohol use: Yes     Alcohol/week: 3.0 standard drinks     Types: 3 Standard drinks or equivalent per week    Drug use: No       Allergies: Allergies   Allergen Reactions    Demerol [Meperidine] Itching    Penicillin G Anaphylaxis       CURRENT MEDICATIONS      Current Discharge Medication List        CONTINUE these medications which have NOT CHANGED    Details   cetirizine (ZYRTEC) 10 mg tablet Take 1 Tablet by mouth nightly. Qty: 30 Tablet, Refills: 1      famotidine (PEPCID) 20 mg tablet Take 1 Tablet by mouth two (2) times a day. Qty: 60 Tablet, Refills: 1      clobetasoL (TEMOVATE) 0.05 % ointment Apply  to affected area two (2) times a day. Qty: 30 g, Refills: 1      hydrOXYzine HCL (ATARAX) 50 mg tablet Take 1 Tablet by mouth three (3) times daily as needed for Itching for up to 10 days. Qty: 60 Tablet, Refills: 1      camphor-menthoL (SARNA) 0.5-0.5 % lotion Apply  to affected area as needed for Itching.   Qty: 222 mL, Refills: 0             PHYSICAL EXAM      ED Triage Vitals [02/09/23 0959]   ED Encounter Vitals Group      /74      Pulse (Heart Rate) (!) 101      Resp Rate 16      Temp 98.4 °F (36.9 °C)      Temp src       O2 Sat (%) 96 %      Weight 182 lb      Height 5' 3\"        Physical Exam  Vitals and nursing note reviewed. Constitutional:       General: She is not in acute distress. Appearance: She is well-developed. She is not ill-appearing. HENT:      Head: Normocephalic and atraumatic. Right Ear: Tympanic membrane and ear canal normal.      Left Ear: Tympanic membrane and ear canal normal.      Nose: Nose normal.      Mouth/Throat:      Mouth: Mucous membranes are dry. Pharynx: Oropharynx is clear. No oropharyngeal exudate or posterior oropharyngeal erythema. Eyes:      Extraocular Movements: Extraocular movements intact. Conjunctiva/sclera: Conjunctivae normal.      Pupils: Pupils are equal, round, and reactive to light. Cardiovascular:      Rate and Rhythm: Normal rate and regular rhythm. Pulses: Normal pulses. Heart sounds: Normal heart sounds. Pulmonary:      Effort: Pulmonary effort is normal.      Breath sounds: Rhonchi present. No rales. Abdominal:      General: Bowel sounds are normal. There is no distension. Palpations: Abdomen is soft. There is no mass. Tenderness: There is no abdominal tenderness. There is no right CVA tenderness, left CVA tenderness or guarding. Musculoskeletal:      Cervical back: Normal range of motion and neck supple. Skin:     General: Skin is warm and dry. Neurological:      Mental Status: She is alert and oriented to person, place, and time. GCS: GCS eye subscore is 4. GCS verbal subscore is 5. GCS motor subscore is 6.         DIAGNOSTIC RESULTS   LABS:     Recent Results (from the past 12 hour(s))   EKG, 12 LEAD, INITIAL    Collection Time: 02/09/23 10:06 AM   Result Value Ref Range    Ventricular Rate 96 BPM    Atrial Rate 96 BPM    P-R Interval 128 ms    QRS Duration 76 ms    Q-T Interval 356 ms    QTC Calculation (Bezet) 449 ms    Calculated P Axis 57 degrees    Calculated R Axis 30 degrees    Calculated T Axis 24 degrees    Diagnosis       Normal sinus rhythm  Possible Left atrial enlargement  Borderline ECG  When compared with ECG of 31-MAY-2010 00:29,  No significant change was found  Confirmed by Seyd Nicholson M.D., [facility] [place] ([id number]) on 2/9/2023 48:32:33 AM     METABOLIC PANEL, COMPREHENSIVE    Collection Time: 02/09/23 10:09 AM   Result Value Ref Range    Sodium 136 136 - 145 mmol/L    Potassium 3.6 3.5 - 5.1 mmol/L    Chloride 102 97 - 108 mmol/L    CO2 23 21 - 32 mmol/L    Anion gap 11 5 - 15 mmol/L    Glucose 108 (H) 65 - 100 mg/dL    BUN 19 6 - 20 MG/DL    Creatinine 1.22 (H) 0.55 - 1.02 MG/DL    BUN/Creatinine ratio 16 12 - 20      eGFR 51 (L) >60 ml/min/1.73m2    Calcium 8.4 (L) 8.5 - 10.1 MG/DL    Bilirubin, total 0.5 0.2 - 1.0 MG/DL    ALT (SGPT) 28 12 - 78 U/L    AST (SGOT) 43 (H) 15 - 37 U/L    Alk. phosphatase 65 45 - 117 U/L    Protein, total 7.4 6.4 - 8.2 g/dL    Albumin 3.3 (L) 3.5 - 5.0 g/dL    Globulin 4.1 (H) 2.0 - 4.0 g/dL    A-G Ratio 0.8 (L) 1.1 - 2.2     CBC WITH AUTOMATED DIFF    Collection Time: 02/09/23 10:09 AM   Result Value Ref Range    WBC 6.0 3.6 - 11.0 K/uL    RBC 6.02 (H) 3.80 - 5.20 M/uL    HGB 13.2 11.5 - 16.0 g/dL    HCT 42.1 35.0 - 47.0 %    MCV 69.9 (L) 80.0 - 99.0 FL    MCH 21.9 (L) 26.0 - 34.0 PG    MCHC 31.4 30.0 - 36.5 g/dL    RDW 17.2 (H) 11.5 - 14.5 %    PLATELET 620 703 - 475 K/uL    MPV 10.9 8.9 - 12.9 FL    NRBC 0.0 0  WBC    ABSOLUTE NRBC 0.00 0.00 - 0.01 K/uL    NEUTROPHILS 61 32 - 75 %    BAND NEUTROPHILS 11 (H) 0 - 6 %    LYMPHOCYTES 20 12 - 49 %    MONOCYTES 8 5 - 13 %    EOSINOPHILS 0 0 - 7 %    BASOPHILS 0 0 - 1 %    IMMATURE GRANULOCYTES 0 %    ABS. NEUTROPHILS 4.3 1.8 - 8.0 K/UL    ABS. LYMPHOCYTES 1.2 0.8 - 3.5 K/UL    ABS. MONOCYTES 0.5 0.0 - 1.0 K/UL    ABS. EOSINOPHILS 0.0 0.0 - 0.4 K/UL    ABS.  BASOPHILS 0.0 0.0 - 0.1 K/UL    ABS. IMM. GRANS. 0.0 K/UL    DF MANUAL      RBC COMMENTS ANISOCYTOSIS  1+       TROPONIN-HIGH SENSITIVITY    Collection Time: 02/09/23 10:09 AM   Result Value Ref Range    Troponin-High Sensitivity 11 0 - 51 ng/L   NT-PRO BNP    Collection Time: 02/09/23 10:09 AM   Result Value Ref Range    NT pro-BNP 38 0 - 125 PG/ML   GLUCOSE, POC    Collection Time: 02/09/23 10:29 AM   Result Value Ref Range    Glucose (POC) 85 65 - 117 mg/dL    Performed by Hemal Chavez RN    COVID-19 WITH INFLUENZA A/B    Collection Time: 02/09/23 10:30 AM   Result Value Ref Range    SARS-CoV-2 by PCR Not detected NOTD      Influenza A by PCR Not detected      Influenza B by PCR Not detected     TROPONIN-HIGH SENSITIVITY    Collection Time: 02/09/23 12:56 PM   Result Value Ref Range    Troponin-High Sensitivity 11 0 - 51 ng/L   URINALYSIS W/ REFLEX CULTURE    Collection Time: 02/09/23  1:07 PM    Specimen: Urine   Result Value Ref Range    Color DARK YELLOW      Appearance CLOUDY (A) CLEAR      Specific gravity 1.020      pH (UA) 5.5 5.0 - 8.0      Protein 30 (A) NEG mg/dL    Glucose Negative NEG mg/dL    Ketone 40 (A) NEG mg/dL    Bilirubin Negative NEG      Blood TRACE (A) NEG      Urobilinogen 1.0 0.2 - 1.0 EU/dL    Nitrites Negative NEG      Leukocyte Esterase MODERATE (A) NEG      WBC 10-20 0 - 4 /hpf    RBC 0-5 0 - 5 /hpf    Epithelial cells FEW FEW /lpf    Bacteria Negative NEG /hpf    UA:UC IF INDICATED URINE CULTURE ORDERED (A) CNI          EKG: When ordered, EKG's are interpreted by the Emergency Department Physician in the absence of a cardiologist.  Please see their note for interpretation of EKG.       RADIOLOGY:  Non-plain film images such as CT, Ultrasound and MRI are read by the radiologist. Plain radiographic images are visualized and preliminarily interpreted by the ED Provider with the below findings:        Interpretation per the Radiologist below, if available at the time of this note:     XR CHEST PORT    Result Date: 2/9/2023  EXAM:  XR CHEST PORT INDICATION: Cough and dizziness COMPARISON: 2010 TECHNIQUE: portable chest AP view obtained portably at 1020 hours FINDINGS: The cardiac silhouette is within normal limits. The pulmonary vasculature is within normal limits. There is patchy multilobar airspace disease involving all lung zones but densest in the lung apices. No pleural effusions are seen. The visualized bones and upper abdomen are age-appropriate.      Multilobar pneumonia        PROCEDURES   Unless otherwise noted below, none  Procedures     CRITICAL CARE TIME   none    EMERGENCY DEPARTMENT COURSE and DIFFERENTIAL DIAGNOSIS/MDM   Vitals:    Vitals:    02/09/23 1259 02/09/23 1305 02/09/23 1505 02/09/23 1515   BP: 130/61  (!) 127/59    Pulse: 100 (!) 111 91    Resp: 25 29 16    Temp:   98.8 °F (37.1 °C)    SpO2:  96%     Weight:    85.8 kg (189 lb 1.6 oz)   Height:    5' 3\" (1.6 m)        Patient was given the following medications:  Medications   cefTRIAXone (ROCEPHIN) 1 g in sterile water (preservative free) 10 mL IV syringe (1 g IntraVENous Given 2/9/23 1507)   guaiFENesin-dextromethorphan (ROBITUSSIN DM) 100-10 mg/5 mL syrup 10 mL (10 mL Oral Given 2/9/23 1508)   azithromycin (ZITHROMAX) tablet 500 mg (has no administration in time range)   sodium chloride (NS) flush 5-40 mL (has no administration in time range)   sodium chloride (NS) flush 5-40 mL (has no administration in time range)   acetaminophen (TYLENOL) tablet 650 mg (has no administration in time range)     Or   acetaminophen (TYLENOL) suppository 650 mg (has no administration in time range)   polyethylene glycol (MIRALAX) packet 17 g (has no administration in time range)   ondansetron (ZOFRAN ODT) tablet 4 mg (has no administration in time range)     Or   ondansetron (ZOFRAN) injection 4 mg (has no administration in time range)   enoxaparin (LOVENOX) injection 40 mg (has no administration in time range)   0.9% sodium chloride infusion (100 mL/hr IntraVENous New Bag 2/9/23 1508)   sodium chloride 0.9 % bolus infusion 1,000 mL (0 mL IntraVENous IV Completed 2/9/23 1254)   azithromycin (ZITHROMAX) 500 mg in 0.9% sodium chloride 250 mL (VIAL-MATE) (0 mg IntraVENous IV Completed 2/9/23 1254)   doxycycline (VIBRA-TABS) tablet 100 mg (100 mg Oral Given 2/9/23 1148)   albuterol-ipratropium (DUO-NEB) 2.5 MG-0.5 MG/3 ML (3 mL Nebulization Given 2/9/23 1159)   dexamethasone (PF) (DECADRON) 10 mg/mL injection 10 mg (10 mg IntraVENous Given 2/9/23 1146)   ondansetron (ZOFRAN) injection 4 mg (4 mg IntraVENous Given 2/9/23 1300)       CONSULTS: (Who and What was discussed)  None    Chronic Conditions: Depression, arthritis    Social Determinants affecting Dx or Tx: None    Records Reviewed (source and summary of external records): Prior medical records, Previous Radiology studies, Previous Laboratory studies, and Nursing notes    CC/HPI Summary, DDx, ED Course, and Reassessment: 60-year-old male presenting with dizziness exhibiting benign neurological exam.  She does have rhonchi on exam with slight tachypnea but no signs of respiratory distress. Oxygen saturations 94-96 on room air. Patient does appear ill and fatigued. Plan on obtaining COVID and influenza testing. In addition will obtain chest x-ray to rule out pneumonia versus fluid in the lungs. Due to syncopal episode we will also obtain a labs to rule out cardiac etiology if unremarkable will consider head CT for further evaluation for syncope. ED Course as of 02/09/23 1637   Thu Feb 09, 2023   1219 Progress Note:   Plan to repeat troponin and perform orthostatics. If unremarkable with d/c home with PO abx  [NA]   1323 Progress Note:   Nurse Lakshmi Carlson attempted to ambulate patient but she was weak and tachypnea upon standing.  Plan to consult hospitalist for admission  [NA]   1442 Consult Note:   Discussed case with hospitalist Dr Emmanuel Perez and he accepts pt for admission [NA]      ED Course User Index  [NA] Luiz Fam NP       Disposition Considerations (Tests not done, Shared Decision Making, Pt Expectation of Test or Tx.): Differentials considered include MI, electrolyte imbalance, hypoglycemia, orthostatic hypotension, dehydration, COVID, influenza,      FINAL IMPRESSION     1. Pneumonia of both lungs due to infectious organism, unspecified part of lung          DISPOSITION/PLAN   Admitted         PATIENT REFERRED TO:  Follow-up Information       Follow up With Specialties Details Why Contact Info    Laura Vaz MD Internal Medicine Physician   [address] [place] [address] [address] [place] [address]  [phone]                DISCHARGE MEDICATIONS:  Current Discharge Medication List            DISCONTINUED MEDICATIONS:  Current Discharge Medication List          ED Attending Involvement : I have seen and evaluated the patient. My supervision physician was available for consultation. I am the Primary Clinician of Record. Luiz Fam NP (electronically signed)    (Please note that parts of this dictation were completed with voice recognition software. Quite often unanticipated grammatical, syntax, homophones, and other interpretive errors are inadvertently transcribed by the computer software. Please disregards these errors.  Please excuse any errors that have escaped final proofreading.)

## 2023-02-09 NOTE — ED NOTES
TRANSFER - OUT REPORT:    Verbal report given to amanda (name) on Gunner Hogan  being transferred to medical surgical bed (unit) for routine progression of care       Report consisted of patients Situation, Background, Assessment and   Recommendations(SBAR). Information from the following report(s) SBAR, Kardex, ED Summary, Intake/Output, MAR, Accordion, and Cardiac Rhythm sinus tach  was reviewed with the receiving nurse. Lines:   Peripheral IV 02/09/23 Left Wrist (Active)   Site Assessment Clean, dry, & intact 02/09/23 1015   Phlebitis Assessment 0 02/09/23 1015   Infiltration Assessment 0 02/09/23 1015   Dressing Status Clean, dry, & intact 02/09/23 1015   Dressing Type Transparent 02/09/23 1015   Hub Color/Line Status Pink;Flushed;Patent 02/09/23 1015   Action Taken Blood drawn 02/09/23 1015       Peripheral IV 02/09/23 Anterior;Proximal;Right Forearm (Active)        Opportunity for questions and clarification was provided.       Patient transported with:   Registered Nurse

## 2023-02-09 NOTE — PROGRESS NOTES
[facility] Admission Pharmacy Medication Reconciliation    Information obtained from:patient (good historian)  RxQuery data available: yes    Comments/recommendations:  none    Medication changes (since last review): Added  none  Removed  Naproxen, phentermine  Adjusted  none    The [place] Prescription Monitoring Program () was accessed to determine fill history of any controlled medications. no   1RxQuery pharmacy benefit data reflects medications filled and processed through the patient's insurance, however                this data does NOT capture whether the medication was picked up or is currently being taken by the patient. Patient allergies: Allergies as of 02/09/2023 - Fully Reviewed 02/09/2023   Allergen Reaction Noted    Demerol [meperidine] Itching 05/28/2010    Penicillin g Anaphylaxis 05/28/2010         Prior to Admission Medications   Prescriptions Last Dose Informant Patient Reported? Taking?   camphor-menthoL (SARNA) 0.5-0.5 % lotion  Self Yes Yes   Sig: Apply  to affected area as needed for Itching. cetirizine (ZYRTEC) 10 mg tablet  Self No Yes   Sig: Take 1 Tablet by mouth nightly. clobetasoL (TEMOVATE) 0.05 % ointment  Self No Yes   Sig: Apply  to affected area two (2) times a day. famotidine (PEPCID) 20 mg tablet  Self No Yes   Sig: Take 1 Tablet by mouth two (2) times a day.   hydrOXYzine HCL (ATARAX) 50 mg tablet  Self No Yes   Sig: Take 1 Tablet by mouth three (3) times daily as needed for Itching for up to 10 days.       Facility-Administered Medications: None          Thank you,  MICKI Wayne

## 2023-02-09 NOTE — PROGRESS NOTES
12) Perfectbrenda Martinez Pneumonia pt coming, Dorys Rojas is negative- can we discontinue Droplet Plus isolation? 1500) Discuss with Dr. Nia Mcqueen for breathing treatments, second cough medicine, second line for nausea  1530) Discuss orthostatic blood pressure check with pt. Pt very fatigued--plan to try later  80 )Perfectbrenda Tolbert throat has a bright red rim--do you want to check for strep? 4167) Perfectbrenda Glez Most I have an order for physical therapy--pt unsteady walking (possibly due to weakness?)  1920) Bedside shift change report given to STAR Hernández (oncoming nurse) by Zev Pennington RN (offgoing nurse). Report included the following information SBAR, Kardex, MAR, Recent Results, and Cardiac Rhythm NSR .

## 2023-02-10 LAB
ANION GAP SERPL CALC-SCNC: 8 MMOL/L (ref 5–15)
BUN SERPL-MCNC: 12 MG/DL (ref 6–20)
BUN/CREAT SERPL: 15 (ref 12–20)
CALCIUM SERPL-MCNC: 8.1 MG/DL (ref 8.5–10.1)
CHLORIDE SERPL-SCNC: 107 MMOL/L (ref 97–108)
CO2 SERPL-SCNC: 25 MMOL/L (ref 21–32)
CREAT SERPL-MCNC: 0.8 MG/DL (ref 0.55–1.02)
GLUCOSE SERPL-MCNC: 133 MG/DL (ref 65–100)
POTASSIUM SERPL-SCNC: 4.3 MMOL/L (ref 3.5–5.1)
SODIUM SERPL-SCNC: 140 MMOL/L (ref 136–145)

## 2023-02-10 PROCEDURE — 94640 AIRWAY INHALATION TREATMENT: CPT

## 2023-02-10 PROCEDURE — 36415 COLL VENOUS BLD VENIPUNCTURE: CPT

## 2023-02-10 PROCEDURE — 74011250636 HC RX REV CODE- 250/636: Performed by: STUDENT IN AN ORGANIZED HEALTH CARE EDUCATION/TRAINING PROGRAM

## 2023-02-10 PROCEDURE — G0378 HOSPITAL OBSERVATION PER HR: HCPCS

## 2023-02-10 PROCEDURE — 65270000032 HC RM SEMIPRIVATE

## 2023-02-10 PROCEDURE — 74011000258 HC RX REV CODE- 258: Performed by: STUDENT IN AN ORGANIZED HEALTH CARE EDUCATION/TRAINING PROGRAM

## 2023-02-10 PROCEDURE — 94760 N-INVAS EAR/PLS OXIMETRY 1: CPT

## 2023-02-10 PROCEDURE — 97116 GAIT TRAINING THERAPY: CPT | Performed by: PHYSICAL THERAPIST

## 2023-02-10 PROCEDURE — 87070 CULTURE OTHR SPECIMN AEROBIC: CPT

## 2023-02-10 PROCEDURE — 74011250637 HC RX REV CODE- 250/637: Performed by: STUDENT IN AN ORGANIZED HEALTH CARE EDUCATION/TRAINING PROGRAM

## 2023-02-10 PROCEDURE — 80048 BASIC METABOLIC PNL TOTAL CA: CPT

## 2023-02-10 PROCEDURE — 96376 TX/PRO/DX INJ SAME DRUG ADON: CPT

## 2023-02-10 PROCEDURE — 97161 PT EVAL LOW COMPLEX 20 MIN: CPT | Performed by: PHYSICAL THERAPIST

## 2023-02-10 PROCEDURE — 74011000250 HC RX REV CODE- 250: Performed by: STUDENT IN AN ORGANIZED HEALTH CARE EDUCATION/TRAINING PROGRAM

## 2023-02-10 RX ADMIN — CEFTRIAXONE SODIUM 1 G: 1 INJECTION, POWDER, FOR SOLUTION INTRAMUSCULAR; INTRAVENOUS at 11:32

## 2023-02-10 RX ADMIN — IPRATROPIUM BROMIDE AND ALBUTEROL SULFATE 3 ML: 2.5; .5 SOLUTION RESPIRATORY (INHALATION) at 19:22

## 2023-02-10 RX ADMIN — SODIUM CHLORIDE 100 ML/HR: 9 INJECTION, SOLUTION INTRAVENOUS at 04:51

## 2023-02-10 RX ADMIN — GUAIFENESIN AND DEXTROMETHORPHAN 10 ML: 100; 10 SYRUP ORAL at 14:54

## 2023-02-10 RX ADMIN — IPRATROPIUM BROMIDE AND ALBUTEROL SULFATE 3 ML: 2.5; .5 SOLUTION RESPIRATORY (INHALATION) at 08:53

## 2023-02-10 RX ADMIN — SODIUM CHLORIDE, PRESERVATIVE FREE 10 ML: 5 INJECTION INTRAVENOUS at 05:44

## 2023-02-10 RX ADMIN — SODIUM CHLORIDE, PRESERVATIVE FREE 10 ML: 5 INJECTION INTRAVENOUS at 13:39

## 2023-02-10 RX ADMIN — SODIUM CHLORIDE, PRESERVATIVE FREE 10 ML: 5 INJECTION INTRAVENOUS at 21:29

## 2023-02-10 RX ADMIN — AZITHROMYCIN 500 MG: 250 TABLET, FILM COATED ORAL at 08:39

## 2023-02-10 RX ADMIN — ENOXAPARIN SODIUM 40 MG: 100 INJECTION SUBCUTANEOUS at 08:40

## 2023-02-10 NOTE — PROGRESS NOTES
Bedside and Verbal shift change report given to Lenny Dalton 71 (oncoming nurse) by Gigi Gregory RN (offgoing nurse). Report included the following information SBAR, Intake/Output, MAR, Recent Results, Cardiac Rhythm NSR, Alarm Parameters , and Quality Measures.

## 2023-02-10 NOTE — PROGRESS NOTES
Problem: Mobility Impaired (Adult and Pediatric)  Goal: *Acute Goals and Plan of Care (Insert Text)  Description: FUNCTIONAL STATUS PRIOR TO ADMISSION: Patient was independent and active without use of DME.    HOME SUPPORT PRIOR TO ADMISSION: The patient lived with a roommate but did not require assist.    Physical Therapy Goals  Initiated 2/10/2023  1. Patient will transfer from bed to chair and chair to bed with independence using the least restrictive device within 7 day(s). 2.  Patient will perform sit to stand with independence within 7 day(s). 3.  Patient will ambulate with independence for 300 feet with the least restrictive device within 7 day(s). 4.  Patient will ascend/descend 12 stairs with bilateral handrail(s) with modified independence within 7 day(s). Outcome: Not Met     PHYSICAL THERAPY EVALUATION  Patient: Hoang Altamirano (64 y.o. female)  Date: 2/10/2023  Primary Diagnosis: Pneumonia [J18.9]       Precautions:        ASSESSMENT  Based on the objective data described below, the patient presents with limited endurance and mobility. Patient independent and working at baseline. Patient independent with bed mobility. Patient required SBA for sit to stand transfers. She ambulated 60 feet with SBA and no assistive device. Patient with slow pace but no loss of balance. Patient fatigued after ambulation today. Patient educated on pacing with activity with good understanding verbalized. Orthostatics negative. No PT needs anticipated at discharge. Current Level of Function Impacting Discharge (mobility/balance): SBA    Other factors to consider for discharge: limited endurance     Patient will benefit from skilled therapy intervention to address the above noted impairments.        PLAN :  Recommendations and Planned Interventions: bed mobility training, transfer training, gait training, therapeutic exercises, neuromuscular re-education, patient and family training/education, and therapeutic activities Frequency/Duration: Patient will be followed by physical therapy:  3 times a week to address goals. Recommendation for discharge: (in order for the patient to meet his/her long term goals)  No skilled physical therapy/ follow up rehabilitation needs identified at this time. This discharge recommendation:  Has been made in collaboration with the attending provider and/or case management    IF patient discharges home will need the following DME: none         SUBJECTIVE:   Patient stated I feel better than I did.     OBJECTIVE DATA SUMMARY:   HISTORY:    Past Medical History:   Diagnosis Date    Arthritis     Depression, major, recurrent, moderate ([facility] [address] 8/15/2019    Other specified dorsopathies, sacral and sacrococcygeal region (CODE)      Past Surgical History:   Procedure Laterality Date    HX COLONOSCOPY  1/15/15    Dr. Manolo Lakhani - 8 yr f/u    HX HYSTERECTOMY  2003    [address] PROCEDURES  2003    right shoulder    HX ORTHOPAEDIC  12/2009    bilateral knee replacement         Home Situation  Home Environment: Private residence  # Steps to Enter: 0  One/Two Story Residence: Two story  # of Interior Steps:  (12)  Interior Rails: Both  Living Alone: No  Support Systems: Friend/Neighbor (roommate)  Patient Expects to be Discharged to[de-identified] Home  Current DME Used/Available at Home: None  Tub or Shower Type: Tub/Shower combination    EXAMINATION/PRESENTATION/DECISION MAKING:   Critical Behavior:  Neurologic State: Alert  Orientation Level: Oriented X4  Cognition: Follows commands     Hearing: Auditory  Auditory Impairment: None    Range Of Motion:  AROM: Within functional limits     PROM: Within functional limits    Strength:    Strength:  Within functional limits       Tone & Sensation:   Tone: Normal    Sensation: Intact        Coordination:  Coordination: Within functional limits    Functional Mobility:  Bed Mobility:  Rolling: Independent  Supine to Sit: Independent  Sit to Supine: Independent  Scooting: Independent    Transfers:  Sit to Stand: Stand-by assistance  Stand to Sit: Stand-by assistance    Balance:   Sitting: Intact  Standing: Impaired; Without support  Standing - Static: Good  Standing - Dynamic : Good;Fair    Ambulation/Gait Training:  Distance (ft): 60 Feet (ft)  Assistive Device: Gait belt  Ambulation - Level of Assistance: Stand-by assistance  Gait Description (WDL): Exceptions to WDL  Gait Abnormalities: Decreased step clearance  Base of Support: Widened  Speed/Gabby: Slow  Step Length: Right shortened;Left shortened    Therapeutic Exercises:   Patient educated on seated exercises: marches, LAQ, ankle pumps, ankle circles    Based on the above components, the patient evaluation is determined to be of the following complexity level: LOW     Pain Rating:  No pain    Activity Tolerance:   Good    After treatment patient left in no apparent distress:   Sitting in chair and Call bell within reach    COMMUNICATION/EDUCATION:   The patients plan of care was discussed with: Registered nurse, Physician, Case management, and Certified nursing assistant/patient care technician. Fall prevention education was provided and the patient/caregiver indicated understanding., Patient/family have participated as able in goal setting and plan of care. , and Patient/family agree to work toward stated goals and plan of care.     Thank you for this referral.  Blanca Frank, PT   Time Calculation: 35 mins

## 2023-02-10 NOTE — PROGRESS NOTES
SRIDHAR     RUR  8 %     Patient in through ER yesterday   Work-up in progress   CM assessment in progress   Detail note to follow    [address] MSW RN   [phone]

## 2023-02-10 NOTE — PROGRESS NOTES
1915 Bedside shift change report given to [address] [place] [address] Drive (oncoming nurse) by Braulio Brewer (offgoing nurse). Report included the following information SBAR, Kardex, Intake/Output, and MAR.    0400 sample taken to the lab.    0715 Bedside shift change report given to STAR Tai (oncoming nurse) by [address] [place] [address] (offgoing nurse). Report included the following information SBAR, Kardex, Intake/Output, and MAR.

## 2023-02-10 NOTE — PROGRESS NOTES
Physician Progress Note      Callie Cadet  CSN #:                  [id number]  :                       1960  ADMIT DATE:       2023 9:53 AM  DISCH DATE:  RESPONDING  PROVIDER #:        Jade Borrero MD          QUERY TEXT:    Dear attending,    Patient admitted with pneumonia and UTI. Noted documentation of sepsis in the H&P. However, the patient was afebrile, WBC and neutrophils were not elevated. In order to support the diagnosis of sepsis, please include additional clinical indicators in your documentation. Or please document if the diagnosis of sepsis has been ruled out after further study. 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  Clinical Indicators: -WBC 6.0, UA -moderate LE  H&P- Community-acquired pneumonia POA  Sepsis due to above POA  Productive cough due to above  Syncopal episode due to above  On admission heart rate 101 respiratory rate 30  Chest x-ray with multilobar pneumonia  COVID-19 influenza negative  UTI  UA with moderate leukoesterase WBC urine culture pending  Treatment: IV Ceftriaxone 1g q24 hours, IV azithromycin 500 mg po qd, Monitor vital signs, labwork, imaging    Thank you,    Kisha Perez RN, BSN, CRCR, CCDS  Clinical Documentation Improvement  [phone] or via Perfect Serve  Options provided:  -- Sepsis present as evidenced by, Please document evidence.   -- Sepsis was ruled out after study  -- Other - I will add my own diagnosis  -- Disagree - Not applicable / Not valid  -- Disagree - Clinically unable to determine / Unknown  -- Refer to Clinical Documentation Reviewer    PROVIDER RESPONSE TEXT:    Sepsis is present as evidenced by RR 30 HR > 100    Query created by: Lewis Wharton on 2/10/2023 8:44 AM      Electronically signed by:  Jade Borrero MD 2/10/2023 9:28 AM

## 2023-02-10 NOTE — PROGRESS NOTES
ADULT PROTOCOL: JET AEROSOL ASSESSMENT    Patient  Alo Gallegos     63 y.o.   female     2/10/2023  12:27 AM    Breath Sounds Pre Procedure: Right Breath Sounds: Scattered wheezing (due to excessive cough)                               Left Breath Sounds: Scattered wheezing (due to excessive cough)    Breath Sounds Post Procedure: Right Breath Sounds: Coarse, Scattered wheezing                                 Left Breath Sounds: Coarse, Scattered wheezing    Breathing pattern: Pre procedure            Post procedure Breathing Pattern: Regular (slightly dyspneic due to coughing)    Heart Rate: Pre procedure Pulse: 75           Post procedure Pulse: 78    Resp Rate: Pre procedure Respirations: 22           Post procedure Respirations: 22    Peak Flow: Pre bronchodilator             Post bronchodilator       FVC/FEV1:  N/A    Incentive Spirometry:             Cough: Pre procedure                 Post procedure Cough: Non-productive, Congested, Hacking    Suctioned: NO    Sputum: Pre procedure                   Post procedure      Oxygen: O2 Device: None (Room air)    Room air     Changed: NO    SpO2: Pre procedure SpO2: 97 %   without oxygen              Post procedure SpO2: 99 %  without oxygen    Nebulizer Therapy: Current medications Aerosolized Medications: DuoNeb      Changed: NO    Smoking History:     Problem List:   Patient Active Problem List   Diagnosis Code    Advance directive declined by patient Z78.9    Depression, major, recurrent, moderate ([facility] F33.1    Pneumonia J18.9       Respiratory Therapist: Myranda Vásquez, RT

## 2023-02-10 NOTE — PROGRESS NOTES
[address] [facility]ist [facility]                                                                               Hospitalist Progress Note  Slime Rae MD          Date of Service:  2/10/2023  NAME:  Azalea Huang  :  1960  MRN:  [id number]    Please note that this dictation was completed with [employer], the computer voice recognition software. Quite often unanticipated grammatical, syntax, homophones, and other interpretive errors are inadvertently transcribed by the computer software. Please disregard these errors. Please excuse any errors that have escaped final proofreading. Admission Summary:      Azalea Huang is a 63 y.o. female who presents with . Syncopal episode. Patient states that she works at a [employer] and is around people who have been coughing lately. Patient endorsed shortness of breath productive cough generalized weakness and episode of passing out and. Denies any chest pain belly pain difficulty with bowel or bladder     The patient denies any headache, blurry vision, sore throat, trouble swallowing, trouble with speech, chest pain, , fever, chills, N/V/D, abd pain, urinary symptoms, constipation, recent travels, sick contacts, focal or generalized neurological symptoms, falls, injuries, rashes, contact with COVID-19 diagnosed patients, hematemesis, melena, hemoptysis, hematuria, rashes, denies starting any new medications and denies any other concerns or problems besides as mentioned above. Interval history / Subjective:   Patient is complaining of cough but feeling better overall.   Ambulated with physical therapist no rehab needs identified     Assessment & Plan:     Anticipated discharge date : 2023  Anticipated disposition : Home  Barriers to discharge : Medical stability     #Community-acquired pneumonia POA  #Sepsis due to above POA  #Productive cough due to above  #Syncopal episode due to above  -On admission heart rate 101 respiratory rate 30  -Chest x-ray with multilobar pneumonia  -COVID-19 influenza negative  -Sputum culture strep pneumo and Legionella antigen pending     -Ceftriaxone azithromycin  -PT OT and speech     #UTI  -UA with moderate leukoesterase WBC urine culture pending  -On IV antibiotics  -Follow culture data     #History of anxiety  -Resume home meds once confirmed by pharmacy     Code status: Full  Prophylaxis: Lovenox  Care Plan discussed with: IDR, RN, PT OT, case management, pharmacy  Anticipated Disposition: Home  Inpatient  Cardiac monitoring: Telemetry,      Hospital Problems  Date Reviewed: 1/31/2023            Codes Class Noted POA    Pneumonia ICD-10-CM: J18.9  ICD-9-CM: 560  2/9/2023 Unknown           Social Determinants of Health     Tobacco Use: Medium Risk    Smoking Tobacco Use: Former    Smokeless Tobacco Use: Never    Passive Exposure: Not on file   Alcohol Use: Not on file   Financial Resource Strain: Medium Risk    Difficulty of Paying Living Expenses: Somewhat hard   Food Insecurity: Food Insecurity Present    Worried About Running Out of Food in the Last Year: Sometimes true    Ran Out of Food in the Last Year: Sometimes true   Transportation Needs: Not on file   Physical Activity: Not on file   Stress: Not on file   Social Connections: Not on file   Intimate Partner Violence: Not on file   Depression: Not at risk    PHQ-2 Score: 0   Housing Stability: Not on file       Review of Systems:   A comprehensive review of systems was negative except for: Respiratory: positive for cough, sputum, pneumonia, or dyspnea on exertion       Vital Signs:    Last 24hrs VS reviewed since prior progress note.  Most recent are:  Visit Vitals  /69 (BP 1 Location: Left upper arm, BP Patient Position: At rest;Lying left side)   Pulse 72   Temp 98.2 °F (36.8 °C)   Resp 18   Ht 5' 3\" (1.6 m)   Wt 85.8 kg (189 lb 1.6 oz)   SpO2 96%   BMI 33.50 kg/m²         Intake/Output Summary (Last 24 hours) at 2/10/2023 0151  Last data filed at 2/10/2023 0700  Gross per 24 hour   Intake 1250 ml   Output 1550 ml   Net -300 ml        Physical Examination:     I had a face to face encounter with this patient and independently examined them on 2/10/2023 as outlined below:          General : alert x 3, awake, no acute distress,   HEENT: PEERL, EOMI, moist mucus membrane, TM clear  Neck: supple, no JVD, no meningeal signs  Chest: Clear to auscultation bilaterally   CVS: S1 S2 heard, Capillary refill less than 2 seconds  Abd: soft/ non tender, non distended, BS physiological,   Ext: no clubbing, no cyanosis, no edema, brisk 2+ DP pulses  Neuro/Psych: pleasant mood and affect, CN 2-12 grossly intact, sensory grossly within normal limit, Strength 5/5 in all extremities, DTR 1+ x 4  Skin: warm     Data Review:    Review and/or order of clinical lab test  Review and/or order of tests in the radiology section of CPT  Review and/or order of tests in the medicine section of CPT  I personally reviewed  Image and EKG/Monitor Tracing      I have personally and independently reviewed all pertinent labs, diagnostic studies, imaging, and have provided independent interpretation of the same. Labs:     Recent Labs     02/09/23  1009   WBC 6.0   HGB 13.2   HCT 42.1        Recent Labs     02/10/23  0410 02/09/23  1009    136   K 4.3 3.6    102   CO2 25 23   BUN 12 19   CREA 0.80 1.22*   * 108*   CA 8.1* 8.4*     Recent Labs     02/09/23  1009   ALT 28   AP 65   TBILI 0.5   TP 7.4   ALB 3.3*   GLOB 4.1*     No results for input(s): INR, PTP, APTT, INREXT in the last 72 hours. No results for input(s): FE, TIBC, PSAT, FERR in the last 72 hours. No results found for: FOL, RBCF   No results for input(s): PH, PCO2, PO2 in the last 72 hours. No results for input(s): CPK, CKNDX, TROIQ in the last 72 hours.     No lab exists for component: CPKMB  Lab Results   Component Value Date/Time    Cholesterol, total 204 (H) 04/09/2019 03:20 PM    HDL Cholesterol 88 04/09/2019 03:20 PM    LDL, calculated 103 (H) 04/09/2019 03:20 PM    Triglyceride 63 04/09/2019 03:20 PM     Lab Results   Component Value Date/Time    Glucose (POC) 85 02/09/2023 10:29 AM     Lab Results   Component Value Date/Time    Color DARK YELLOW 02/09/2023 01:07 PM    Appearance CLOUDY (A) 02/09/2023 01:07 PM    Specific gravity 1.020 02/09/2023 01:07 PM    pH (UA) 5.5 02/09/2023 01:07 PM    Protein 30 (A) 02/09/2023 01:07 PM    Glucose Negative 02/09/2023 01:07 PM    Ketone 40 (A) 02/09/2023 01:07 PM    Bilirubin Negative 02/09/2023 01:07 PM    Urobilinogen 1.0 02/09/2023 01:07 PM    Nitrites Negative 02/09/2023 01:07 PM    Leukocyte Esterase MODERATE (A) 02/09/2023 01:07 PM    Epithelial cells FEW 02/09/2023 01:07 PM    Bacteria Negative 02/09/2023 01:07 PM    WBC 10-20 02/09/2023 01:07 PM    RBC 0-5 02/09/2023 01:07 PM       Notes reviewed from all clinical/nonclinical/nursing services involved in patient's clinical care. Care coordination discussions were held with appropriate clinical/nonclinical/ nursing providers based on care coordination needs. Patients current active Medications were reviewed, considered, added and adjusted based on the clinical condition today. Home Medications were reconciled to the best of my ability given all available resources at the time of admission. Route is PO if not otherwise noted.       Admission Status:[id number]:::1}      Medications Reviewed:     Current Facility-Administered Medications   Medication Dose Route Frequency    guaiFENesin-dextromethorphan (ROBITUSSIN DM) 100-10 mg/5 mL syrup 10 mL  10 mL Oral Q6H PRN    azithromycin (ZITHROMAX) tablet 500 mg  500 mg Oral DAILY    sodium chloride (NS) flush 5-40 mL  5-40 mL IntraVENous Q8H    sodium chloride (NS) flush 5-40 mL  5-40 mL IntraVENous PRN    acetaminophen (TYLENOL) tablet 650 mg  650 mg Oral Q6H PRN    Or    acetaminophen (TYLENOL) suppository 650 mg  650 mg Rectal Q6H PRN polyethylene glycol (MIRALAX) packet 17 g  17 g Oral DAILY PRN    ondansetron (ZOFRAN ODT) tablet 4 mg  4 mg Oral Q8H PRN    Or    ondansetron (ZOFRAN) injection 4 mg  4 mg IntraVENous Q6H PRN    enoxaparin (LOVENOX) injection 40 mg  40 mg SubCUTAneous DAILY    0.9% sodium chloride infusion  100 mL/hr IntraVENous CONTINUOUS    cefTRIAXone (ROCEPHIN) 1 g in 0.9% sodium chloride (MBP/ADV) 50 mL MBP  1 g IntraVENous Q24H    albuterol-ipratropium (DUO-NEB) 2.5 MG-0.5 MG/3 ML  3 mL Nebulization Q12H     ______________________________________________________________________  EXPECTED LENGTH OF STAY: 2d 21h  ACTUAL LENGTH OF STAY:          1                 Klaudia Thompson MD

## 2023-02-10 NOTE — BH NOTES
TRANSITION OF CARE    RUR:  8%  ELOS:  2/11/23  DISPOSITION:  HOME  TRANSPORTATION:  HER SON   DME:  None  FOLLOW UP WITH PCP SCHEDULED AND ON AVS    Reason for Admission:  Pneumonia                 RUR Score:         8%            Plan for utilizing home health:     HH is not needed. Pt is independent in ADLs. PCP: First and Last name:  Lyssa Callaway MD     Name of Practice:  [facility] [address] [facility]   Are you a current patient: Yes/No:  Yes   Approximate date of last visit:  01/31/2023   Can you participate in a virtual visit with your PCP: Yes                    Current Advanced Directive/Advance Care Plan: Full Code      Healthcare Decision Maker:   Click here to complete [address] including selection of the Healthcare Decision Maker Relationship (ie \"Primary\")             Primary Decision Maker: Alexander Mathew Child - [phone]                  Transition of Care Plan:   Pt admitted with diagnosis of pneumonia. Discharge plan is return to her home with PCP follow up. Pt lives with a roommate and she is employed. She is independent in ADLs and has no need for DME or HH. She plans to return to work and stated that she does not need a return to work note. Her son will pick her up at discharge and her prescriptions are filled at [facility] on [facility] and [address] [place] [address]. She did not identify any barriers to her discharge which is projected to be tomorrow (2/11/23). Pt identified her daughter, Mayte De La Cruz as her surrogate decision maker for health care. Care Management Interventions  PCP Verified by CM: Yes (Pt reports seeing PCP about one week ago)  Palliative Care Criteria Met (RRAT>21 & CHF Dx)?: No  Mode of Transport at Discharge:  Other (see comment) (Pt's son)  Transition of Care Consult (CM Consult): Discharge Planning  Discharge Durable Medical Equipment: No  Physical Therapy Consult: No  Occupational Therapy Consult: No  Speech Therapy Consult: No  Support Systems: Child(sarah)  The Plan for Transition of Care is Related to the Following Treatment Goals : medical stability  The Patient and/or Patient Representative was Provided with a Choice of Provider and Agrees with the Discharge Plan?: Yes  Freedom of Choice List was Provided with Basic Dialogue that Supports the Patient's Individualized Plan of Care/Goals, Treatment Preferences and Shares the Quality Data Associated with the Providers?: No   Resource Information Provided?: No  Discharge Location  Patient Expects to be Discharged to[de-identified] Home with outpatient services

## 2023-02-11 VITALS
SYSTOLIC BLOOD PRESSURE: 116 MMHG | HEART RATE: 80 BPM | BODY MASS INDEX: 33.5 KG/M2 | HEIGHT: 63 IN | WEIGHT: 189.1 LBS | TEMPERATURE: 97.9 F | RESPIRATION RATE: 16 BRPM | OXYGEN SATURATION: 97 % | DIASTOLIC BLOOD PRESSURE: 64 MMHG

## 2023-02-11 LAB
BACTERIA SPEC CULT: ABNORMAL
BACTERIA SPEC CULT: ABNORMAL
BACTERIA SPEC CULT: NORMAL
CC UR VC: ABNORMAL
SERVICE CMNT-IMP: ABNORMAL
SERVICE CMNT-IMP: NORMAL

## 2023-02-11 PROCEDURE — 74011000258 HC RX REV CODE- 258: Performed by: STUDENT IN AN ORGANIZED HEALTH CARE EDUCATION/TRAINING PROGRAM

## 2023-02-11 PROCEDURE — 94640 AIRWAY INHALATION TREATMENT: CPT

## 2023-02-11 PROCEDURE — 74011250637 HC RX REV CODE- 250/637: Performed by: STUDENT IN AN ORGANIZED HEALTH CARE EDUCATION/TRAINING PROGRAM

## 2023-02-11 PROCEDURE — G0378 HOSPITAL OBSERVATION PER HR: HCPCS

## 2023-02-11 PROCEDURE — 74011000250 HC RX REV CODE- 250: Performed by: STUDENT IN AN ORGANIZED HEALTH CARE EDUCATION/TRAINING PROGRAM

## 2023-02-11 PROCEDURE — 96367 TX/PROPH/DG ADDL SEQ IV INF: CPT

## 2023-02-11 PROCEDURE — 74011250636 HC RX REV CODE- 250/636: Performed by: STUDENT IN AN ORGANIZED HEALTH CARE EDUCATION/TRAINING PROGRAM

## 2023-02-11 RX ORDER — AZITHROMYCIN 250 MG/1
500 TABLET, FILM COATED ORAL DAILY
Qty: 8 TABLET | Refills: 0 | Status: SHIPPED | OUTPATIENT
Start: 2023-02-11 | End: 2023-02-15

## 2023-02-11 RX ORDER — ALBUTEROL SULFATE 90 UG/1
1 AEROSOL, METERED RESPIRATORY (INHALATION)
Qty: 18 G | Refills: 0 | Status: SHIPPED | OUTPATIENT
Start: 2023-02-11

## 2023-02-11 RX ORDER — CEFDINIR 300 MG/1
300 CAPSULE ORAL 2 TIMES DAILY
Qty: 10 CAPSULE | Refills: 0 | Status: SHIPPED | OUTPATIENT
Start: 2023-02-11 | End: 2023-02-16

## 2023-02-11 RX ORDER — GUAIFENESIN/DEXTROMETHORPHAN 100-10MG/5
10 SYRUP ORAL
Qty: 30 ML | Refills: 0 | Status: SHIPPED | OUTPATIENT
Start: 2023-02-11 | End: 2023-02-21

## 2023-02-11 RX ADMIN — ENOXAPARIN SODIUM 40 MG: 100 INJECTION SUBCUTANEOUS at 08:15

## 2023-02-11 RX ADMIN — CEFTRIAXONE SODIUM 1 G: 1 INJECTION, POWDER, FOR SOLUTION INTRAMUSCULAR; INTRAVENOUS at 11:06

## 2023-02-11 RX ADMIN — SODIUM CHLORIDE, PRESERVATIVE FREE 10 ML: 5 INJECTION INTRAVENOUS at 06:34

## 2023-02-11 RX ADMIN — IPRATROPIUM BROMIDE AND ALBUTEROL SULFATE 3 ML: 2.5; .5 SOLUTION RESPIRATORY (INHALATION) at 07:45

## 2023-02-11 RX ADMIN — AZITHROMYCIN 500 MG: 250 TABLET, FILM COATED ORAL at 08:15

## 2023-02-11 NOTE — PROGRESS NOTES
1915 Bedside shift change report given to  [address] (oncoming nurse) by Marilyn Bagley (offgoing nurse). Report included the following information SBAR, Kardex, Intake/Output, and MAR.      0715 Bedside shift change report given to STAR Tai (oncoming nurse) by [address] [place] [address] Drive (offgoing nurse). Report included the following information SBAR, Kardex, Intake/Output, and MAR.

## 2023-02-11 NOTE — PROGRESS NOTES
[facility].       2/11/2023      RE: Virginia Nina      To Whom it May Concern: This is to certify that Virginia Nina was admitted to hospital on 2/9/2023   to  She may return to work on 2/20/23      Thank you for your assistance in this matter.     Sincerely,      Valencia Rincon MD

## 2023-02-11 NOTE — PROGRESS NOTES
Problem: Falls - Risk of  Goal: *Absence of Falls  Description: Document Lyn Bernard Fall Risk and appropriate interventions in the flowsheet.   2/11/2023 1253 by Jocelyn Shepard RN  Outcome: Resolved/Met  Note: Fall Risk Interventions:                             2/11/2023 0946 by Jocelyn Shepard RN  Outcome: Progressing Towards Goal  Note: Fall Risk Interventions:                                Problem: Patient Education: Go to Patient Education Activity  Goal: Patient/Family Education  2/11/2023 1253 by Jocelyn Shepard RN  Outcome: Resolved/Met  2/11/2023 0946 by Jocelyn Shepard RN  Outcome: Progressing Towards Goal     Problem: Urinary Tract Infection - Adult  Goal: *Absence of infection signs and symptoms  2/11/2023 1253 by Jocelyn Shepard RN  Outcome: Resolved/Met  2/11/2023 0946 by Jocelyn Shepard RN  Outcome: Progressing Towards Goal     Problem: Patient Education: Go to Patient Education Activity  Goal: Patient/Family Education  2/11/2023 1253 by Jocelyn Shepard RN  Outcome: Resolved/Met  2/11/2023 0946 by Jocelyn Shepard RN  Outcome: Progressing Towards Goal     Problem: Patient Education: Go to Patient Education Activity  Goal: Patient/Family Education  2/11/2023 1253 by Jocelyn Shepard RN  Outcome: Resolved/Met  2/11/2023 0946 by Jocelyn Shepard RN  Outcome: Progressing Towards Goal     Problem: Pneumonia: Day 1  Goal: Off Pathway (Use only if patient is Off Pathway)  2/11/2023 1253 by Jocelyn Shepard RN  Outcome: Resolved/Met  2/11/2023 0946 by Jocelyn Shepard RN  Outcome: Progressing Towards Goal  Goal: Activity/Safety  2/11/2023 1253 by Jocelyn Shepard RN  Outcome: Resolved/Met  2/11/2023 0946 by Jocelyn Shepard RN  Outcome: Progressing Towards Goal  Goal: Consults, if ordered  2/11/2023 1253 by Jocelyn Shepard RN  Outcome: Resolved/Met  2/11/2023 0946 by Jocelyn Shepard RN  Outcome: Progressing Towards Goal  Goal: Diagnostic Test/Procedures  2/11/2023 1253 by Normajean Quince, RN  Outcome: Resolved/Met  2/11/2023 0946 by Etta Rodriguez, RN  Outcome: Progressing Towards Goal  Goal: Nutrition/Diet  2/11/2023 1253 by Etta Rodriguez, RN  Outcome: Resolved/Met  2/11/2023 0946 by Etta Rodriguez RN  Outcome: Progressing Towards Goal  Goal: Medications  2/11/2023 1253 by Etta Rodriguez, RN  Outcome: Resolved/Met  2/11/2023 0946 by Etta Rodriguez, RN  Outcome: Progressing Towards Goal  Goal: Respiratory  2/11/2023 1253 by Etta Rodriguez, RN  Outcome: Resolved/Met  2/11/2023 0946 by Etta Rodriguez RN  Outcome: Progressing Towards Goal  Goal: Treatments/Interventions/Procedures  2/11/2023 1253 by Etta Rodriguez, RN  Outcome: Resolved/Met  2/11/2023 0946 by Etta Rodriguez RN  Outcome: Progressing Towards Goal  Goal: Psychosocial  2/11/2023 1253 by Etta Rodriguez, RN  Outcome: Resolved/Met  2/11/2023 0946 by Etta Rodriguez RN  Outcome: Progressing Towards Goal  Goal: *Oxygen saturation within defined limits  2/11/2023 1253 by Etta Rodriguez, RN  Outcome: Resolved/Met  2/11/2023 0946 by Etta Rodriguez RN  Outcome: Progressing Towards Goal  Goal: *Influenza vaccine administered (October-March)  2/11/2023 1253 by Etta Rodriguez, RN  Outcome: Resolved/Met  2/11/2023 0946 by Etta Rodriguez RN  Outcome: Progressing Towards Goal  Goal: *Pneumoccocal vaccine administered  2/11/2023 1253 by Etta Rodriguez, RN  Outcome: Resolved/Met  2/11/2023 0946 by Etta Rodriguez RN  Outcome: Progressing Towards Goal  Goal: *Hemodynamically stable  2/11/2023 1253 by Etta Rodriguez, RN  Outcome: Resolved/Met  2/11/2023 0946 by Etta Rodriguez RN  Outcome: Progressing Towards Goal  Goal: *Demonstrates progressive activity  2/11/2023 1253 by Etta Rodriguez, RN  Outcome: Resolved/Met  2/11/2023 0946 by Etta Rodriguez RN  Outcome: Progressing Towards Goal  Goal: *Tolerating diet  2/11/2023 1253 by Etta Rodriguez, RN  Outcome: Resolved/Met  2/11/2023 0946 by Etta Rodriguez, RN  Outcome: Progressing Towards Goal     Problem: Pneumonia: Day 2  Goal: Off Pathway (Use only if patient is Off Pathway)  2/11/2023 1253 by Albino Omer RN  Outcome: Resolved/Met  2/11/2023 0946 by Albino Omer RN  Outcome: Progressing Towards Goal  Goal: Activity/Safety  2/11/2023 1253 by Albino Omer RN  Outcome: Resolved/Met  2/11/2023 0946 by Albino Omer RN  Outcome: Progressing Towards Goal  Goal: Consults, if ordered  2/11/2023 1253 by Albino Omer, STAR  Outcome: Resolved/Met  2/11/2023 0946 by Albino Omer RN  Outcome: Progressing Towards Goal  Goal: Diagnostic Test/Procedures  2/11/2023 1253 by Albino Omer RN  Outcome: Resolved/Met  2/11/2023 0946 by Albino Omer RN  Outcome: Progressing Towards Goal  Goal: Nutrition/Diet  2/11/2023 1253 by Albino Omer RN  Outcome: Resolved/Met  2/11/2023 0946 by Albino Omer RN  Outcome: Progressing Towards Goal  Goal: Discharge Planning  2/11/2023 1253 by Albino Omer RN  Outcome: Resolved/Met  2/11/2023 0946 by Albino Omer RN  Outcome: Progressing Towards Goal  Goal: Medications  2/11/2023 1253 by Albino Omer RN  Outcome: Resolved/Met  2/11/2023 0946 by Albino Omer RN  Outcome: Progressing Towards Goal  Goal: Respiratory  2/11/2023 1253 by Albino Omer RN  Outcome: Resolved/Met  2/11/2023 0946 by Albino Omer RN  Outcome: Progressing Towards Goal  Goal: Treatments/Interventions/Procedures  2/11/2023 1253 by Albino Omer RN  Outcome: Resolved/Met  2/11/2023 0946 by Albino Omer RN  Outcome: Progressing Towards Goal  Goal: Psychosocial  2/11/2023 1253 by Albino Omer RN  Outcome: Resolved/Met  2/11/2023 0946 by Albino Omer RN  Outcome: Progressing Towards Goal  Goal: *Oxygen saturation within defined limits  2/11/2023 1253 by Albino Omer RN  Outcome: Resolved/Met  2/11/2023 0946 by Albino Omer RN  Outcome: Progressing Towards Goal  Goal: *Hemodynamically stable  2/11/2023 1253 by Albino Omer RN  Outcome: Resolved/Met  2/11/2023 0946 by Albino Omer RN  Outcome: Progressing Towards Goal  Goal: *Demonstrates progressive activity  2/11/2023 1253 by Li Cagle RN  Outcome: Resolved/Met  2/11/2023 0946 by Li Cagle, RN  Outcome: Progressing Towards Goal  Goal: *Tolerating diet  2/11/2023 1253 by Li Cagle, RN  Outcome: Resolved/Met  2/11/2023 0946 by Li Cagle, RN  Outcome: Progressing Towards Goal  Goal: *Optimal pain control at patient's stated goal  2/11/2023 1253 by Li Cagle, RN  Outcome: Resolved/Met  2/11/2023 0946 by Li Cagle, RN  Outcome: Progressing Towards Goal     Problem: Pneumonia: Day 3  Goal: Off Pathway (Use only if patient is Off Pathway)  2/11/2023 1253 by Li Cagle, RN  Outcome: Resolved/Met  2/11/2023 0946 by Li Cagle, RN  Outcome: Progressing Towards Goal  Goal: Activity/Safety  2/11/2023 1253 by Li Cagle, RN  Outcome: Resolved/Met  2/11/2023 0946 by Li Cagle, RN  Outcome: Progressing Towards Goal  Goal: Consults, if ordered  2/11/2023 1253 by Li Cagle, RN  Outcome: Resolved/Met  2/11/2023 0946 by Li Cagle, RN  Outcome: Progressing Towards Goal  Goal: Diagnostic Test/Procedures  2/11/2023 1253 by Li Cagle, RN  Outcome: Resolved/Met  2/11/2023 0946 by Li Cagle RN  Outcome: Progressing Towards Goal  Goal: Nutrition/Diet  2/11/2023 1253 by Li Cagle, RN  Outcome: Resolved/Met  2/11/2023 0946 by Li Cagle, RN  Outcome: Progressing Towards Goal  Goal: Discharge Planning  2/11/2023 1253 by Li Cagle, RN  Outcome: Resolved/Met  2/11/2023 0946 by Li Cagle, RN  Outcome: Progressing Towards Goal  Goal: Medications  2/11/2023 1253 by Li Cagle, RN  Outcome: Resolved/Met  2/11/2023 0946 by Li Cagle, RN  Outcome: Progressing Towards Goal  Goal: Respiratory  2/11/2023 1253 by Li Cagle, RN  Outcome: Resolved/Met  2/11/2023 0946 by Li Fusi, RN  Outcome: Progressing Towards Goal  Goal: Treatments/Interventions/Procedures  2/11/2023 1253 by Li Cagle RN  Outcome: Resolved/Met  2/11/2023 0946 by Li Cagle RN  Outcome: Progressing Towards Goal  Goal: Psychosocial  2/11/2023 1253 by Skip Angulo, RN  Outcome: Resolved/Met  2/11/2023 0946 by Skip Angulo, RN  Outcome: Progressing Towards Goal  Goal: *Oxygen saturation within defined limits  2/11/2023 1253 by Skip Angulo, RN  Outcome: Resolved/Met  2/11/2023 0946 by Skip Angulo, RN  Outcome: Progressing Towards Goal  Goal: *Hemodynamically stable  2/11/2023 1253 by Skip Angulo, RN  Outcome: Resolved/Met  2/11/2023 0946 by Skip Angulo, RN  Outcome: Progressing Towards Goal  Goal: *Demonstrates progressive activity  2/11/2023 1253 by Skip Angulo, RN  Outcome: Resolved/Met  2/11/2023 0946 by Skip Angulo, RN  Outcome: Progressing Towards Goal  Goal: *Tolerating diet  2/11/2023 1253 by Skip Angulo, RN  Outcome: Resolved/Met  2/11/2023 0946 by Skip Angulo, RN  Outcome: Progressing Towards Goal  Goal: *Describes available resources and support systems  2/11/2023 1253 by Skip Angulo, RN  Outcome: Resolved/Met  2/11/2023 0946 by Skip Angulo RN  Outcome: Progressing Towards Goal  Goal: *Optimal pain control at patient's stated goal  2/11/2023 1253 by Skip Angulo, RN  Outcome: Resolved/Met  2/11/2023 0946 by Skip Angulo, RN  Outcome: Progressing Towards Goal     Problem: Pneumonia: Day 4  Goal: Off Pathway (Use only if patient is Off Pathway)  2/11/2023 1253 by Skip Angulo, RN  Outcome: Resolved/Met  2/11/2023 0946 by Skip Angulo, RN  Outcome: Progressing Towards Goal  Goal: Activity/Safety  2/11/2023 1253 by Skip Angulo, RN  Outcome: Resolved/Met  2/11/2023 0946 by Skip Angulo, RN  Outcome: Progressing Towards Goal  Goal: Nutrition/Diet  2/11/2023 1253 by Skip Angulo, RN  Outcome: Resolved/Met  2/11/2023 0946 by Skip Angulo, RN  Outcome: Progressing Towards Goal  Goal: Discharge Planning  2/11/2023 1253 by Skip Angulo, RN  Outcome: Resolved/Met  2/11/2023 0946 by Skip Angulo, RN  Outcome: Progressing Towards Goal  Goal: Medications  2/11/2023 1253 by Ugo Marking, RN  Outcome: Resolved/Met  2/11/2023 3798 by Ugo Marking, RN  Outcome: Progressing Towards Goal  Goal: Respiratory  2/11/2023 1253 by Ugo Marking, RN  Outcome: Resolved/Met  2/11/2023 0946 by Ugo Marking, RN  Outcome: Progressing Towards Goal  Goal: Treatments/Interventions/Procedures  2/11/2023 1253 by Ugo Marking, RN  Outcome: Resolved/Met  2/11/2023 0946 by Ugo Marking, RN  Outcome: Progressing Towards Goal  Goal: Psychosocial  2/11/2023 1253 by Ugo Marking, RN  Outcome: Resolved/Met  2/11/2023 0946 by Ugo Marking, RN  Outcome: Progressing Towards Goal     Problem: Pneumonia: Discharge Outcomes  Goal: *Demonstrates progressive activity  2/11/2023 1253 by Ugo Marking, RN  Outcome: Resolved/Met  2/11/2023 0946 by Ugo Marking, RN  Outcome: Progressing Towards Goal  Goal: *Describes follow-up/return visits to physicians  2/11/2023 1253 by Ugo Marking, RN  Outcome: Resolved/Met  2/11/2023 0946 by Ugo Marking, RN  Outcome: Progressing Towards Goal  Goal: *Tolerating diet  2/11/2023 1253 by Ugo Marking, RN  Outcome: Resolved/Met  2/11/2023 0946 by Ugo Marking, RN  Outcome: Progressing Towards Goal  Goal: Rolande Frankel name, dosage, time, side effects, and number of days to continue medications  2/11/2023 1253 by Ugo Marking, RN  Outcome: Resolved/Met  2/11/2023 0946 by Ugo Marking, RN  Outcome: Progressing Towards Goal  Goal: *Influenza immunization  2/11/2023 1253 by Ugo Marking, RN  Outcome: Resolved/Met  2/11/2023 0946 by Ugo Marking, RN  Outcome: Progressing Towards Goal  Goal: *Pneumococcal immunization  2/11/2023 1253 by Ugo Marking, RN  Outcome: Resolved/Met  2/11/2023 0946 by Ugo Marking, RN  Outcome: Progressing Towards Goal  Goal: *Respiratory status at baseline  2/11/2023 1253 by Ugo Marking, RN  Outcome: Resolved/Met  2/11/2023 0946 by Ugo Marking, RN  Outcome: Progressing Towards Goal  Goal: *Vital signs within defined limits  2/11/2023 1253 by Jerald Rogers, Donella Cheadle, RN  Outcome: Resolved/Met  2/11/2023 0946 by Sofi Nelson RN  Outcome: Progressing Towards Goal  Goal: *Describes available resources and support systems  2/11/2023 1253 by Sofi Nelson RN  Outcome: Resolved/Met  2/11/2023 0946 by Sofi Nelson RN  Outcome: Progressing Towards Goal  Goal: *Optimal pain control at patient's stated goal  2/11/2023 1253 by Sofi Nelson RN  Outcome: Resolved/Met  2/11/2023 0946 by Sofi Nelson RN  Outcome: Progressing Towards Goal

## 2023-02-11 NOTE — PROGRESS NOTES
Pharmacist Discharge Medication Reconciliation    Discharge Provider:  Dr Arlyn Desir       Discharge Medications:      My Medications        START taking these medications        Instructions Each Dose to Equal Morning Noon Evening Bedtime   albuterol 90 mcg/actuation inhaler  Commonly known as: PROVENTIL HFA, VENTOLIN HFA, PROAIR HFA    Your last dose was: Your next dose is: Take 1 Puff by inhalation every four (4) hours as needed for Wheezing. 1 Puff                 azithromycin 250 mg tablet  Commonly known as: ZITHROMAX    Your last dose was: Your next dose is: Take 2 Tablets by mouth daily for 4 days. 500 mg                 cefdinir 300 mg capsule  Commonly known as: OMNICEF    Your last dose was: Your next dose is: Take 1 Capsule by mouth two (2) times a day for 5 days. 300 mg                 guaiFENesin-dextromethorphan 100-10 mg/5 mL syrup  Commonly known as: ROBITUSSIN DM    Your last dose was: Your next dose is: Take 10 mL by mouth every six (6) hours as needed for Cough or Congestion for up to 10 days. 10 mL                        CONTINUE taking these medications        Instructions Each Dose to Equal Morning Noon Evening Bedtime   camphor-menthoL 0.5-0.5 % lotion  Commonly known as: SARNA    Your last dose was: Your next dose is:         Apply  to affected area as needed for Itching. cetirizine 10 mg tablet  Commonly known as: ZYRTEC    Your last dose was: Your next dose is: Take 1 Tablet by mouth nightly. 10 mg                 clobetasoL 0.05 % ointment  Commonly known as: TEMOVATE    Your last dose was: Your next dose is:         Apply  to affected area two (2) times a day. famotidine 20 mg tablet  Commonly known as: PEPCID    Your last dose was: Your next dose is: Take 1 Tablet by mouth two (2) times a day.    20 mg                        STOP taking these medications hydrOXYzine HCL 50 mg tablet  Commonly known as: ATARAX                  Where to Get Your Medications        These medications were sent to [id number]- [facility], [address] [place] [address], [facility].      Hours: 24-hours Phone: [phone]   albuterol 90 mcg/actuation inhaler  azithromycin 250 mg tablet  cefdinir 300 mg capsule  guaiFENesin-dextromethorphan 100-10 mg/5 mL syrup          The patient's chart, MAR, and AVS were reviewed by   MICKI Cole,   Contact: [phone]

## 2023-02-11 NOTE — PROGRESS NOTES
Problem: Airway Clearance - Ineffective  Goal: Able to cough effectively  2/10/2023 1928 by Cookie Gnozalez, RT  Outcome: Progressing Towards Goal  2/10/2023 1926 by Cookie Gonzalez, RT  Outcome: Progressing Towards Goal     Problem: Breathing Pattern - Ineffective  Goal: *Absence of hypoxia  Outcome: Progressing Towards Goal     Problem: Breathing Pattern - Ineffective  Goal: *Use of effective breathing techniques  Outcome: Progressing Towards Goal     Problem: Breathing Pattern - Ineffective  Goal: *Absence of hypoxia  Outcome: Progressing Towards Goal

## 2023-02-11 NOTE — PROGRESS NOTES
ADULT PROTOCOL: JET AEROSOL  REASSESSMENT    Patient  Lisa Bautista     63 y.o.   female     2/11/2023  8:24 AM    Breath Sounds Pre Procedure: Right Breath Sounds: Coarse, Diminished                               Left Breath Sounds: Coarse, Diminished    Breath Sounds Post Procedure: Right Breath Sounds: Coarse                                 Left Breath Sounds: Coarse    Breathing pattern: Pre procedure Breathing Pattern: Regular          Post procedure Breathing Pattern: Regular    Heart Rate: Pre procedure Pulse: 75           Post procedure Pulse: 70    Resp Rate: Pre procedure Respirations: 18           Post procedure Respirations: 20    Cough: Pre procedure Cough: Non-productive, Hacking               Post procedure Cough: Non-productive, Hacking    Suctioned: NO    Oxygen: O2 Device: None (Room air)        Changed: NO    SpO2: Pre procedure SpO2: 96 %   with oxygen              Post procedure SpO2: 99 %  with oxygen    Nebulizer Therapy: Current medications Aerosolized Medications: DuoNeb      Changed: NO    Smoking History: former smoker    Problem List:   Patient Active Problem List   Diagnosis Code    Advance directive declined by patient Z78.9    Dep[facility], major, recurrent, moderate ([facility] F33.1    Pneumonia J18.9       Respiratory Therapist: Giovana Hoffman RT

## 2023-02-11 NOTE — PROGRESS NOTES
Bedside and Verbal shift change report given to Lenny Dalton 71 (oncoming nurse) by Mery Sanders RN (offgoing nurse). Report included the following information SBAR, Intake/Output, MAR, Alarm Parameters , and Quality Measures. 1213- Pt discharged per order. Written and verbal discharge instructions given to pt. Pt educated on medications, importance of follow up, and when to return to ED. Prescriptions e-scribed to [facility] in [place]. Both IV catheters removed intact and without redness or bleeding. Pt awaiting her ride to come at 1400. Pt denies any questions at this moment and no complaints voiced.     1320- Pt wheeled downstairs to wait for ride to pull up

## 2023-02-11 NOTE — DISCHARGE SUMMARY
Discharge Summary   Please note that this dictation was completed with Dot[employer], the computer voice recognition software. Quite often unanticipated grammatical, syntax, homophones, and other interpretive errors are inadvertently transcribed by the computer software. Please disregard these errors. Please excuse any errors that have escaped final proofreading. PATIENT ID: Issa Khan  MRN: [id number]   YOB: 1960    DATE OF ADMISSION: 2/9/2023  9:53 AM    DATE OF DISCHARGE: 2/11/2023  PRIMARY CARE PROVIDER: Geoffrey Clark MD         ATTENDING PHYSICIAN: Kuldip Peacock MD  DISCHARGING PROVIDER: Kuldip Peacock MD       CONSULTATIONS: None    PROCEDURES/SURGERIES: * No surgery found *    ADMITTING HPI from excerpted H&P     Issa Khan is a 63 y.o. female who presents with . Syncopal episode. 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 Patient endorsed shortness of breath productive cough generalized weakness and episode of passing out and.   Denies any chest pain belly pain difficulty with bowel or bladder     The patient denies any headache, blurry vision, sore throat, trouble swallowing, trouble with speech, chest pain, , fever, chills, N/V/D, abd pain, urinary symptoms, constipation, recent travels, sick contacts, focal or generalized neurological symptoms, falls, injuries, rashes, contact with COVID-19 diagnosed patients, hematemesis, melena, hemoptysis, hematuria, rashes, denies starting any new medications and denies any other concerns or problems besides as mentioned above      [address] DIAGNOSIS/ PLAN:     #Community-acquired pneumonia POA  #Sepsis due to above POA resolved  #Productive cough due to above resolved  #Syncopal episode due to above resolved  #Acute bronchitis due to above POA improving  -On admission heart rate 101 respiratory rate 30  -Chest x-ray with multilobar pneumonia  -COVID-19 influenza negative  -Sputum culture strep pneumo and Legionella antigen pending     Patient be discharged home on Omnicef and azithromycin to complete 7-day course for community-acquired pneumonia  Patient discharged home on albuterol as needed and cough medication and follow-up with PCP further management  - Patient agreed and verbalized understanding       #UTI  -UA with moderate leukoesterase WBC urine culture grew gram negative rods  -Patient discharged home on Omnicef should cover gram-negative rods           #History of anxiety  -Resume home meds once confirmed by pharmacy              PENDING TEST RESULTS:   At the time of discharge the following test results are still pending: Urine culture Legionella strep pneumo antigen    FOLLOW UP APPOINTMENTS:    Follow-up Information       Follow up With Specialties Details Why Contact Info    Juan A Bales MD Internal Medicine Physician Follow up on 2/22/2023 Appointment is scheduled for 2/22/23 at 8:00 am.  Please arrive 15 minutes prior to the appointment time with photo ID, insurance card and list of medications. [address] [place] [address] Dr Adkins [address]  [phone]               ADDITIONAL CARE RECOMMENDATIONS: Complaint required recommend follow-up with PCP    DIET: Regular Diet    ACTIVITY: Activity as tolerated    WOUND CARE: None    EQUIPMENT needed: None      DISCHARGE MEDICATIONS:  Current Discharge Medication List        START taking these medications    Details   guaiFENesin-dextromethorphan (ROBITUSSIN DM) 100-10 mg/5 mL syrup Take 10 mL by mouth every six (6) hours as needed for Cough or Congestion for up to 10 days. Qty: 30 mL, Refills: 0  Start date: 2/11/2023, End date: 2/21/2023      azithromycin (ZITHROMAX) 250 mg tablet Take 2 Tablets by mouth daily for 4 days. Qty: 8 Tablet, Refills: 0  Start date: 2/11/2023, End date: 2/15/2023      cefdinir (OMNICEF) 300 mg capsule Take 1 Capsule by mouth two (2) times a day for 5 days.   Qty: 10 Capsule, Refills: 0  Start date: 2/11/2023, End date: 2/16/2023      albuterol (PROVENTIL HFA, VENTOLIN HFA, PROAIR HFA) 90 mcg/actuation inhaler Take 1 Puff by inhalation every four (4) hours as needed for Wheezing. Qty: 18 g, Refills: 0  Start date: 2/11/2023           CONTINUE these medications which have NOT CHANGED    Details   cetirizine (ZYRTEC) 10 mg tablet Take 1 Tablet by mouth nightly. Qty: 30 Tablet, Refills: 1      famotidine (PEPCID) 20 mg tablet Take 1 Tablet by mouth two (2) times a day. Qty: 60 Tablet, Refills: 1      clobetasoL (TEMOVATE) 0.05 % ointment Apply  to affected area two (2) times a day. Qty: 30 g, Refills: 1      camphor-menthoL (SARNA) 0.5-0.5 % lotion Apply  to affected area as needed for Itching. Qty: 222 mL, Refills: 0           STOP taking these medications       hydrOXYzine HCL (ATARAX) 50 mg tablet Comments:   Reason for Stopping:                 NOTIFY YOUR PHYSICIAN FOR ANY OF THE FOLLOWING:   Fever over 101 degrees for 24 hours. Chest pain, shortness of breath, fever, chills, nausea, vomiting, diarrhea, change in mentation, falling, weakness, bleeding. Severe pain or pain not relieved by medications. Or, any other signs or symptoms that you may have questions about.     DISPOSITION:    Home With:   OT  PT  HH  RN       Long term SNF/Inpatient Rehab   x Independent/assisted living    Hospice    Other:       PATIENT CONDITION AT DISCHARGE:     Functional status    Poor     Deconditioned    x Independent      Cognition    x Lucid     Forgetful     Dementia      Catheters/lines (plus indication)    [place]     PICC     PEG    x None      Code status   x  Full code     DNR      PHYSICAL EXAMINATION AT DISCHARGE:    General : alert x 3, awake, no acute distress,   HEENT: PEERL, EOMI, moist mucus membrane, TM clear  Neck: supple, no JVD, no meningeal signs  Chest: Clear to auscultation bilaterally   CVS: S1 S2 heard, Capillary refill less than 2 seconds  Abd: soft/ Non tender, non distended, BS physiological,   Ext: no clubbing, no cyanosis, no edema, brisk 2+ DP pulses  Neuro/Psych: pleasant mood and affect, CN 2-12 grossly intact, sensory grossly within normal limit, Strength 5/5 in all extremities, DTR 1+ x 4  Skin: warm     CHRONIC MEDICAL DIAGNOSES:  Problem List as of 2/11/2023 Date Reviewed: 1/31/2023            Codes Class Noted - Resolved    Pneumonia ICD-10-CM: J18.9  ICD-9-CM: 486  2/9/2023 - Present        Depression, major, recurrent, moderate ([facility] ICD-10-CM: F33.1  ICD-9-CM: 296.32  8/15/2019 - Present        Advance directive declined by patient ICD-10-CM: Z78.9  ICD-9-CM: V49.89  7/6/2016 - Present           Greater than 31 minutes were spent with the patient on counseling and coordination of care    Signed:   Cedric Cabrera MD  2/11/2023  10:07 AM

## 2023-02-11 NOTE — DISCHARGE INSTRUCTIONS
Discharge Instructions       PATIENT ID: Issa Khan  MRN: [id number]   YOB: 1960    DATE OF ADMISSION: 2/9/2023   DATE OF DISCHARGE: 2/11/2023    PRIMARY CARE PROVIDER: Geoffrey Clark     ATTENDING PHYSICIAN: Lashawn Riggs MD   DISCHARGING PROVIDER: Kuldip Peacock MD    To contact this individual call [phone] and ask the  to page. If unavailable ask to be transferred the Adult Hospitalist Department. DISCHARGE DIAGNOSES bacterial pneumonia     CONSULTATIONS: [unfilled]    PROCEDURES/SURGERIES: * No surgery found *    PENDING TEST RESULTS:   At the time of discharge the following test results are still pending: Strep pneumonia and legionella test are pending     [facility]:   [email or web address]     ADDITIONAL CARE RECOMMENDATIONS: Complete antibiotic coure     DIET: Regular Diet      ACTIVITY: Activity as tolerated      DISCHARGE MEDICATIONS:   See Medication Reconciliation Form    It is important that you take the medication exactly as they are prescribed. Keep your medication in the bottles provided by the pharmacist and keep a list of the medication names, dosages, and times to be taken in your wallet. Do not take other medications without consulting your doctor. NOTIFY YOUR PHYSICIAN FOR ANY OF THE FOLLOWING:   Fever over 101 degrees for 24 hours. Chest pain, shortness of breath, fever, chills, nausea, vomiting, diarrhea, change in mentation, falling, weakness, bleeding. Severe pain or pain not relieved by medications. Or, any other signs or symptoms that you may have questions about.       DISPOSITION:    Home With:   OT  PT  HH  RN       SNF/Inpatient Rehab/LTAC   x Independent/assisted living    Hospice    Other:           Signed:   Kuldip Peacock MD  2/11/2023  11:00 AM

## 2023-02-12 LAB
BACTERIA SPEC CULT: NORMAL
GRAM STN SPEC: NORMAL
SERVICE CMNT-IMP: NORMAL

## 2023-02-13 ENCOUNTER — TELEPHONE (OUTPATIENT)
Dept: INTERNAL MEDICINE | Age: 63
End: 2023-02-13

## 2023-02-13 ENCOUNTER — TELEPHONE (OUTPATIENT)
Dept: CASE MANAGEMENT | Age: 63
End: 2023-02-13

## 2023-02-13 LAB
FLUID CULTURE, SPNG2: NORMAL
L PNEUMO1 AG UR QL IA: NEGATIVE
ORGANISM ID, SPNG3: NORMAL
PLEASE NOTE, SPNG4: NORMAL
S PNEUM AG SPEC QL LA: NEGATIVE
SPECIMEN SOURCE: NORMAL
SPECIMEN SOURCE: NORMAL
SPECIMEN, SPNG1: NORMAL

## 2023-02-13 NOTE — TELEPHONE ENCOUNTER
CM called patient by telephone to perform post discharge assessment and for the purpose of follow up call from inpatient discharge to check on environmental challenges/medications/appointment follow up/and questions/concerns. CM left VM for patient to return call. CM will attempt a 2nd call.     [address] [facility], [address] [phone]

## 2023-02-22 ENCOUNTER — OFFICE VISIT (OUTPATIENT)
Dept: INTERNAL MEDICINE CLINIC | Age: 63
End: 2023-02-22
Payer: COMMERCIAL

## 2023-02-22 VITALS
TEMPERATURE: 97.8 F | BODY MASS INDEX: 34.37 KG/M2 | SYSTOLIC BLOOD PRESSURE: 129 MMHG | DIASTOLIC BLOOD PRESSURE: 82 MMHG | RESPIRATION RATE: 14 BRPM | HEART RATE: 92 BPM | WEIGHT: 194 LBS | OXYGEN SATURATION: 100 %

## 2023-02-22 DIAGNOSIS — L27.0 ALLERGIC DRUG RASH: ICD-10-CM

## 2023-02-22 DIAGNOSIS — Z09 HOSPITAL DISCHARGE FOLLOW-UP: ICD-10-CM

## 2023-02-22 DIAGNOSIS — J18.9 PNEUMONIA OF BOTH LUNGS DUE TO INFECTIOUS ORGANISM, UNSPECIFIED PART OF LUNG: Primary | ICD-10-CM

## 2023-02-22 PROCEDURE — 99214 OFFICE O/P EST MOD 30 MIN: CPT | Performed by: INTERNAL MEDICINE

## 2023-02-22 PROCEDURE — 1111F DSCHRG MED/CURRENT MED MERGE: CPT | Performed by: INTERNAL MEDICINE

## 2023-02-22 RX ORDER — HYDROXYZINE 50 MG/1
50 TABLET, FILM COATED ORAL
COMMUNITY

## 2023-02-22 NOTE — PROGRESS NOTES
Transitional Care Management Progress Note    Patient: Duncan Butler  : 1960  PCP: Carla Lu MD    Date of office visit: 2023   Date of admission: 23  Date of discharge: 23  Hospital: [facility]    Call initiated w/i 2 business dates of discharge: *No response documented in the last 14 days   Date of the most recent call to the patient: *No documented post hospital discharge outreach found in the last 14 days      Assessment/Plan:   Diagnoses and all orders for this visit:    1. Pneumonia of both lungs due to infectious organism, unspecified part of lung-follow-up chest x-ray at the end of next month. -     XR CHEST PA LAT; Future    2. Allergic drug rash-resolving. Can taper off Atarax as tolerated and then Pepcid subsequently. 3. Hospital discharge follow-up  -     [facility] [address] [email or web address] MEDS RECONCILED W/CURRENT MED LIST       Subjective:   Duncan Butler is a 63 y.o. female presenting today for follow-up after hospital discharge. This encounter and supporting documentation was reviewed if available. Medication reconciliation was performed today. The main problem requiring admission was pneumonia. Complications during admission: none      Interval history/Current status: Since being home has been feeling well. Her cough has resolved. She has had no use for albuterol. No fevers or chills. No nausea or vomiting. No shortness of breath or wheeze. She is actually back to work now. Her rash has significantly improved on her current regimen. Admitting symptoms have: resolved      Medications marked \"taking\" at this time:  Prior to Admission medications    Medication Sig Start Date End Date Taking? Authorizing Provider   hydrOXYzine HCL (ATARAX) 50 mg tablet Take 50 mg by mouth three (3) times daily as needed.    Yes Provider, Historical   albuterol (PROVENTIL HFA, VENTOLIN HFA, PROAIR HFA) 90 mcg/actuation inhaler Take 1 Puff by inhalation every four (4) hours as needed for Wheezing. 2/11/23  Yes Clarisse Phillips MD   cetirizine (ZYRTEC) 10 mg tablet Take 1 Tablet by mouth nightly. 1/31/23  Yes Daysi Sanchez MD   clobetasoL (TEMOVATE) 0.05 % ointment Apply  to affected area two (2) times a day. 1/31/23  Yes Daysi Sanchez MD   camph[facility]menthoL [facility]) 0.5-0.5 % lotion Apply  to affected area as needed for Itching. 1/31/23  Yes Daysi Sanchez MD   guaiFENesin-dextromethorphan [facility] DM) 100-10 mg/5 mL syrup Take 10 mL by mouth every six (6) hours as needed for Cough or Congestion for up to 10 days. 2/11/23 2/21/23  Clarisse Phillips MD   famotidine (PEPCID) 20 mg tablet Take 1 Tablet by mouth two (2) times a day. Patient not taking: Reported on 2/22/2023 1/31/23   Daysi Sanchez MD        ROS:  Negative except per HPI       Patient Active Problem List    Diagnosis Date Noted    Pneumonia 02/09/2023    Depression, major, recurrent, moderate ([facility] [address] 08/15/2019    Advance directive declined by patient 07/06/2016     Current Outpatient Medications   Medication Sig Dispense Refill    hydrOXYzine HCL (ATARAX) 50 mg tablet Take 50 mg by mouth three (3) times daily as needed. albuterol (PROVENTIL HFA, VENTOLIN HFA, PROAIR HFA) 90 mcg/actuation inhaler Take 1 Puff by inhalation every four (4) hours as needed for Wheezing. 18 g 0    cetirizine (ZYRTEC) 10 mg tablet Take 1 Tablet by mouth nightly. 30 Tablet 1    clobetasoL (TEMOVATE) 0.05 % ointment Apply  to affected area two (2) times a day. 30 g 1    camphor-menthoL (SARNA) 0.5-0.5 % lotion Apply  to affected area as needed for Itching. 222 mL 0    famotidine (PEPCID) 20 mg tablet Take 1 Tablet by mouth two (2) times a day.  (Patient not taking: Reported on 2/22/2023) 60 Tablet 1     Allergies   Allergen Reactions    Demerol [Meperidine] Itching    Penicillin G Anaphylaxis     Past Medical History:   Diagnosis Date    Arthritis     [facility] 8/15/[facility]    Other specified dorsopathies, sacral and sacrococcygeal region (CODE)      Past Surgical History:   Procedure Laterality Date    HX COLONOSCOPY  1/15/15    Dr. Rodriguez Mean - 8 yr f/u    HX HYSTERECTOMY      HX MOHS PROCEDURES      right shoulder    HX ORTHOPAEDIC  2009    bilateral knee replacement     Family History   Adopted: Yes   Problem Relation Age of Onset    Breast Cancer Mother 45        Pt. adopted but family member shared this info     Social History     Tobacco Use    Smoking status: Former     Packs/day: 0.10     Years: 2.00     Pack years: 0.20     Types: Cigarettes     Quit date:      Years since quittin.1    Smokeless tobacco: Never   Substance Use Topics    Alcohol use: Yes     Alcohol/week: 3.0 standard drinks     Types: 3 Standard drinks or equivalent per week          Objective:   Visit Vitals  /82   Pulse 92   Temp 97.8 °F (36.6 °C) (Temporal)   Resp 14   Ht (P) 5' 3\" (1.6 m)   Wt 194 lb (88 kg)   SpO2 100%   BMI (P) 34.37 kg/m²        Physical Examination: General appearance - alert, well appearing, and in no distress  Chest - clear to auscultation, no wheezes, rales or rhonchi, symmetric air entry  Heart - normal rate, regular rhythm, normal S1, S2, no murmurs, rubs, clicks or gallops  Abdomen - soft, nontender, nondistended, no masses or organomegaly  Much improved rash on her legs with almost complete resolution to only postinflammatory hyperpigmentation. We discussed the expected course, resolution and complications of the diagnosis(es) in detail. Medication risks, benefits, costs, interactions, and alternatives were discussed as indicated. I advised her to contact the office if her condition worsens, changes or fails to improve as anticipated. She expressed understanding with the diagnosis(es) and plan.      Damon Mccann MD

## 2023-02-26 RX ORDER — CETIRIZINE HYDROCHLORIDE 10 MG/1
TABLET ORAL
Qty: 30 TABLET | Refills: 1 | Status: SHIPPED | OUTPATIENT
Start: 2023-02-26

## 2023-03-03 DIAGNOSIS — R63.8 WEIGHT DISORDER: ICD-10-CM

## 2023-03-03 RX ORDER — PHENTERMINE HYDROCHLORIDE 37.5 MG/1
TABLET ORAL
Qty: 30 TABLET | Refills: 0 | Status: SHIPPED | OUTPATIENT
Start: 2023-03-03

## 2023-03-31 RX ORDER — CETIRIZINE HYDROCHLORIDE 10 MG/1
TABLET ORAL
Qty: 30 TABLET | Refills: 1 | Status: SHIPPED | OUTPATIENT
Start: 2023-03-31

## 2023-04-02 DIAGNOSIS — R63.8 WEIGHT DISORDER: ICD-10-CM

## 2023-04-02 RX ORDER — PHENTERMINE HYDROCHLORIDE 37.5 MG/1
TABLET ORAL
Qty: 30 TABLET | Refills: 0 | Status: SHIPPED | OUTPATIENT
Start: 2023-04-02

## 2023-05-16 RX ORDER — PHENTERMINE HYDROCHLORIDE 37.5 MG/1
TABLET ORAL
Qty: 30 TABLET | Refills: 0 | OUTPATIENT
Start: 2023-05-16 | End: 2023-06-15

## 2023-06-06 ENCOUNTER — OFFICE VISIT (OUTPATIENT)
Age: 63
End: 2023-06-06
Payer: COMMERCIAL

## 2023-06-06 VITALS
RESPIRATION RATE: 14 BRPM | HEART RATE: 87 BPM | OXYGEN SATURATION: 100 % | SYSTOLIC BLOOD PRESSURE: 139 MMHG | WEIGHT: 184 LBS | HEIGHT: 63 IN | BODY MASS INDEX: 32.6 KG/M2 | DIASTOLIC BLOOD PRESSURE: 83 MMHG | TEMPERATURE: 97.8 F

## 2023-06-06 DIAGNOSIS — E66.9 OBESITY (BMI 30.0-34.9): Primary | ICD-10-CM

## 2023-06-06 DIAGNOSIS — L27.0 GENERALIZED SKIN ERUPTION DUE TO DRUGS AND MEDICAMENTS TAKEN INTERNALLY: ICD-10-CM

## 2023-06-06 DIAGNOSIS — M19.019 PRIMARY OSTEOARTHRITIS OF SHOULDER, UNSPECIFIED LATERALITY: ICD-10-CM

## 2023-06-06 PROCEDURE — 99214 OFFICE O/P EST MOD 30 MIN: CPT | Performed by: INTERNAL MEDICINE

## 2023-06-06 RX ORDER — PHENTERMINE HYDROCHLORIDE 37.5 MG/1
TABLET ORAL
Qty: 30 TABLET | Refills: 2 | Status: SHIPPED | OUTPATIENT
Start: 2023-06-06 | End: 2023-09-06

## 2023-06-06 SDOH — ECONOMIC STABILITY: HOUSING INSECURITY
IN THE LAST 12 MONTHS, WAS THERE A TIME WHEN YOU DID NOT HAVE A STEADY PLACE TO SLEEP OR SLEPT IN A SHELTER (INCLUDING NOW)?: NO

## 2023-06-06 SDOH — ECONOMIC STABILITY: FOOD INSECURITY: WITHIN THE PAST 12 MONTHS, THE FOOD YOU BOUGHT JUST DIDN'T LAST AND YOU DIDN'T HAVE MONEY TO GET MORE.: NEVER TRUE

## 2023-06-06 SDOH — ECONOMIC STABILITY: INCOME INSECURITY: HOW HARD IS IT FOR YOU TO PAY FOR THE VERY BASICS LIKE FOOD, HOUSING, MEDICAL CARE, AND HEATING?: NOT HARD AT ALL

## 2023-06-06 SDOH — ECONOMIC STABILITY: FOOD INSECURITY: WITHIN THE PAST 12 MONTHS, YOU WORRIED THAT YOUR FOOD WOULD RUN OUT BEFORE YOU GOT MONEY TO BUY MORE.: NEVER TRUE

## 2023-06-06 NOTE — PROGRESS NOTES
HPI:  Emy Monge is a 63y.o. year old female who is here for a follow-up visit. Her weight had been down about 15 pounds but she has gained about 5 back. She like to start back on phentermine as it did seem to help. She has some occasional episodes of palpitations that are very brief and unassociated with exertion. She notes that her itching and rash has not significantly improved. She does continue to have some hyperpigmentation and wondered about how to treat that. Her arthritis is under reasonable control currently. Past Medical History:   Diagnosis Date    Arthritis     Depression, major, recurrent, moderate ([facility] [address] 8/15/2019    Other specified dorsopathies, sacral and sacrococcygeal region (CODE)        Past Surgical History:   Procedure Laterality Date    COLONOSCOPY  1/15/15    Dr. Jocelyne Seth - 8 yr f/u    HYSTERECTOMY (CERVIX STATUS UNKNOWN)  2003    MOHS SURGERY  2003    right shoulder    ORTHOPEDIC SURGERY  12/2009    bilateral knee replacement       Prior to Admission medications    Medication Sig Start Date End Date Taking?  Authorizing Provider   phentermine (ADIPEX-P) 37.5 MG tablet TAKE 1 TABLET BY MOUTH DAILY AS NEEDED 6/6/23 9/6/23 Yes Hao Mancera MD   albuterol sulfate HFA (PROVENTIL;VENTOLIN;PROAIR) 108 (90 Base) MCG/ACT inhaler Inhale 1 puff into the lungs every 4 hours as needed 2/11/23  Yes Ar Automatic Reconciliation   camphor-menthol (SARNA) 0.5-0.5 % lotion Apply topically as needed 1/31/23  Yes Ar Automatic Reconciliation   cetirizine (ZYRTEC) 10 MG tablet TAKE 1 TABLET BY MOUTH EVERY NIGHT 2/26/23  Yes Ar Automatic Reconciliation   clobetasol (TEMOVATE) 0.05 % ointment Apply topically 2 times daily 1/31/23  Yes Ar Automatic Reconciliation   hydrOXYzine HCl (ATARAX) 50 MG tablet Take 1 tablet by mouth 3 times daily as needed   Yes Ar Automatic Reconciliation   famotidine (PEPCID) 20 MG tablet Take 20 mg by mouth 2 times daily  Patient not taking: Reported on 6/6/2023 1/31/23

## 2023-09-26 ENCOUNTER — OFFICE VISIT (OUTPATIENT)
Age: 63
End: 2023-09-26

## 2023-09-26 VITALS
WEIGHT: 197.8 LBS | HEART RATE: 74 BPM | DIASTOLIC BLOOD PRESSURE: 80 MMHG | SYSTOLIC BLOOD PRESSURE: 136 MMHG | RESPIRATION RATE: 15 BRPM | BODY MASS INDEX: 35.05 KG/M2 | HEIGHT: 63 IN | OXYGEN SATURATION: 99 % | TEMPERATURE: 97.5 F

## 2023-09-26 DIAGNOSIS — R41.3 MEMORY LOSS: Primary | ICD-10-CM

## 2023-09-26 DIAGNOSIS — M19.019 PRIMARY OSTEOARTHRITIS OF SHOULDER, UNSPECIFIED LATERALITY: ICD-10-CM

## 2023-09-26 DIAGNOSIS — M17.10 UNILATERAL PRIMARY OSTEOARTHRITIS, UNSPECIFIED KNEE: ICD-10-CM

## 2023-09-26 LAB
ALBUMIN SERPL-MCNC: 3.9 G/DL (ref 3.5–5)
ALBUMIN/GLOB SERPL: 1.1 (ref 1.1–2.2)
ALP SERPL-CCNC: 74 U/L (ref 45–117)
ALT SERPL-CCNC: 18 U/L (ref 12–78)
ANION GAP SERPL CALC-SCNC: 1 MMOL/L (ref 5–15)
AST SERPL-CCNC: 14 U/L (ref 15–37)
BASOPHILS # BLD: 0 K/UL (ref 0–0.1)
BASOPHILS NFR BLD: 0 % (ref 0–1)
BILIRUB SERPL-MCNC: 0.6 MG/DL (ref 0.2–1)
BUN SERPL-MCNC: 22 MG/DL (ref 6–20)
BUN/CREAT SERPL: 28 (ref 12–20)
CALCIUM SERPL-MCNC: 9.1 MG/DL (ref 8.5–10.1)
CHLORIDE SERPL-SCNC: 107 MMOL/L (ref 97–108)
CO2 SERPL-SCNC: 31 MMOL/L (ref 21–32)
CREAT SERPL-MCNC: 0.8 MG/DL (ref 0.55–1.02)
DIFFERENTIAL METHOD BLD: ABNORMAL
EOSINOPHIL # BLD: 0.1 K/UL (ref 0–0.4)
EOSINOPHIL NFR BLD: 1 % (ref 0–7)
ERYTHROCYTE [DISTWIDTH] IN BLOOD BY AUTOMATED COUNT: 15.5 % (ref 11.5–14.5)
GLOBULIN SER CALC-MCNC: 3.4 G/DL (ref 2–4)
GLUCOSE SERPL-MCNC: 85 MG/DL (ref 65–100)
HCT VFR BLD AUTO: 40.9 % (ref 35–47)
HGB BLD-MCNC: 12.1 G/DL (ref 11.5–16)
IMM GRANULOCYTES # BLD AUTO: 0 K/UL (ref 0–0.04)
IMM GRANULOCYTES NFR BLD AUTO: 1 % (ref 0–0.5)
LYMPHOCYTES # BLD: 1.7 K/UL (ref 0.8–3.5)
LYMPHOCYTES NFR BLD: 34 % (ref 12–49)
MCH RBC QN AUTO: 22.2 PG (ref 26–34)
MCHC RBC AUTO-ENTMCNC: 29.6 G/DL (ref 30–36.5)
MCV RBC AUTO: 75 FL (ref 80–99)
MONOCYTES # BLD: 0.5 K/UL (ref 0–1)
MONOCYTES NFR BLD: 9 % (ref 5–13)
NEUTS SEG # BLD: 2.8 K/UL (ref 1.8–8)
NEUTS SEG NFR BLD: 55 % (ref 32–75)
NRBC # BLD: 0 K/UL (ref 0–0.01)
NRBC BLD-RTO: 0 PER 100 WBC
PLATELET # BLD AUTO: 347 K/UL (ref 150–400)
PMV BLD AUTO: 11.2 FL (ref 8.9–12.9)
POTASSIUM SERPL-SCNC: 3.6 MMOL/L (ref 3.5–5.1)
PROT SERPL-MCNC: 7.3 G/DL (ref 6.4–8.2)
RBC # BLD AUTO: 5.45 M/UL (ref 3.8–5.2)
SODIUM SERPL-SCNC: 139 MMOL/L (ref 136–145)
TSH SERPL DL<=0.05 MIU/L-ACNC: 0.51 UIU/ML (ref 0.36–3.74)
VIT B12 SERPL-MCNC: 976 PG/ML (ref 193–986)
WBC # BLD AUTO: 5.1 K/UL (ref 3.6–11)

## 2023-09-26 RX ORDER — PHENTERMINE HYDROCHLORIDE 37.5 MG/1
37.5 CAPSULE ORAL EVERY MORNING
COMMUNITY

## 2023-09-26 ASSESSMENT — MINI MENTAL STATE EXAM
SAY: PUT THE PAPER DOWN ON THE FLOOR, SCORE IF PAPER IS PLACED BACK ON FLOOR: 1
WHAT MONTH IS THIS?: 1
WHICH SEASON IS THIS?: 1
SAY: I WOULD LIKE YOU TO COUNT BACKWARD FROM 100 BY SEVENS: 5
WHAT CITY/TOWN ARE WE IN?: 1
SAY: FOLD THE PAPER IN HALF ONCE WITH BOTH HANDS, SCORE IF PAPER IS CORRECTLY FOLDED IN HALF.: 1
WHAT IS THE NAME OF THIS BUILDING [IN FACILITY]?/WHAT IS THE STREET ADDRESS OF THIS HOUSE [IN HOME]?: 1
SUM ALL MMSE QUESTIONS FOR TOTAL SCORE [OUT OF 30].: 30
SHOW: WRISTWATCH [OBJECT] ASK: WHAT IS THIS CALLED?: 1
SAY: I AM GOING TO NAME THREE OBJECTS. WHEN I AM FINISHED, I WANT YOU TO REPEAT
THEM. REMEMBER WHAT THEY ARE BECAUSE I AM GOING TO ASK YOU TO NAME THEM AGAIN IN
A FEW MINUTES.  SAY THE FOLLOWING WORDS SLOWLY AT 1-SECOND INTERVALS - BALL/ CAR/ MAN [ITERATIONS FOR REPEAT ADMINISTRATION]: 3
NOW WHAT WERE THE THREE OBJECTS I ASKED YOU TO REMEMBER?: 3
WHAT DAY OF THE WEEK IS THIS?: 1
WHAT YEAR IS THIS?: 1
ASK THE PERSON IF HE IS RIGHT OR LEFT-HANDED. TAKE A PIECE OF PAPER AND HOLD IT UP IN
FRONT OF THE PERSON. SAY: TAKE THIS PAPER IN YOUR RIGHT/LEFT HAND (WHICHEVER IS NON-
DOMINANT), SCORE IF PAPER IS PICKED UP IN CORRECT HAND.: 1
WHAT IS TODAY'S DATE?: 1
WHAT STATE [OR PROVINCE] ARE WE IN?: 1
HAND THE PERSON A PENCIL AND PAPER. SAY: WRITE ANY COMPLETE SENTENCE ON THAT
PIECE OF PAPER. (NOTE: THE SENTENCE MUST MAKE SENSE. IGNORE SPELLING ERRORS): 1
SHOW: PENCIL [OBJECT] ASK: WHAT IS THIS CALLED?: 1
PLACE DESIGN, ERASER AND PENCIL IN FRONT OF THE PERSON.  SAY:  COPY THIS DESIGN PLEASE.  SHOW: DESIGN. ALLOW: MULTIPLE TRIES. WAIT UNTIL PERSON IS FINISHED AND HANDS IT BACK. SCORE: ONLY FOR DIAGRAM WITH 4-SIDED FIGURE BETWEEN TWO 5-SIDED FIGURES: 1
WHAT COUNTRY ARE WE IN?: 1
WHAT FLOOR ARE WE ON [IN FACILITY]?/ WHAT ROOM ARE WE IN [IN HOME]?: 1
SAY: READ THE WORDS ON THE PAGE AND THEN DO WHAT IT SAYS. THEN HAND THE PERSON
THE SHEET WITH CLOSE YOUR EYES ON IT. IF THE SUBJECT READS AND DOES NOT CLOSE THEIR EYES, REPEAT UP TO THREE TIMES. SCORE ONLY IF SUBJECT CLOSES EYES.: 1
SAY: I WOULD LIKE YOU TO REPEAT THIS PHRASE AFTER ME: NO IFS, ANDS, OR BUTS.: 1

## 2023-09-26 NOTE — PROGRESS NOTES
HPI:  Kota Urbina is a 63y.o. year old female who is here for several issues. Over the last 6 to 9 months she has had increasing difficulty with her memory. She has a difficult time with short-term issues. She has no trouble with doing her finances now. She does feel that she is intermittently anxious. She denies feeling depressed. She denies headaches or dizziness. She is past due for her mammogram.  She does note some ongoing issues with orthopedic pains in her shoulders and her knees. She wondered which medications would be best for that. No family history of dementia that she is aware of. Past Medical History:   Diagnosis Date    Arthritis     Depression, major, recurrent, moderate ([address]) 8/15/2019    Other specified dorsopathies, sacral and sacrococcygeal region (CODE)        Past Surgical History:   Procedure Laterality Date    COLONOSCOPY  1/15/15    Dr. Pradip Trotter - 8 yr f/u    HYSTERECTOMY (CERVIX STATUS UNKNOWN)  2003    MOHS SURGERY  2003    right shoulder    ORTHOPEDIC SURGERY  12/2009    bilateral knee replacement       Prior to Admission medications    Medication Sig Start Date End Date Taking? Authorizing Provider   phentermine 37.5 MG capsule Take 1 capsule by mouth every morning.  Max Daily Amount: 37.5 mg   Yes Historical Provider, MD   albuterol sulfate HFA (PROVENTIL;VENTOLIN;PROAIR) 108 (90 Base) MCG/ACT inhaler Inhale 1 puff into the lungs every 4 hours as needed 2/11/23  Yes Ar Automatic Reconciliation   clobetasol (TEMOVATE) 0.05 % ointment Apply topically 2 times daily 1/31/23  Yes Ar Automatic Reconciliation   hydrOXYzine HCl (ATARAX) 50 MG tablet Take 1 tablet by mouth 3 times daily as needed   Yes Ar Automatic Reconciliation   camphor-menthol (SARNA) 0.5-0.5 % lotion Apply topically as needed  Patient not taking: Reported on 9/26/2023 1/31/23   Ar Automatic Reconciliation   cetirizine (ZYRTEC) 10 MG tablet TAKE 1 TABLET BY MOUTH EVERY NIGHT  Patient not taking: Reported on

## 2023-11-09 DIAGNOSIS — E66.9 OBESITY (BMI 30.0-34.9): Primary | ICD-10-CM

## 2023-11-09 RX ORDER — PHENTERMINE HYDROCHLORIDE 37.5 MG/1
37.5 TABLET ORAL
Qty: 30 TABLET | Refills: 2 | Status: SHIPPED | OUTPATIENT
Start: 2023-11-09 | End: 2023-12-09

## 2023-12-05 LAB — MAMMOGRAPHY, EXTERNAL: NORMAL

## 2024-03-19 RX ORDER — SEMAGLUTIDE 0.25 MG/.5ML
0.25 INJECTION, SOLUTION SUBCUTANEOUS
Qty: 2 ML | Refills: 0 | Status: SHIPPED | OUTPATIENT
Start: 2024-03-19

## 2024-03-19 RX ORDER — SEMAGLUTIDE 0.25 MG/.5ML
0.25 INJECTION, SOLUTION SUBCUTANEOUS
Qty: 2 ML | Refills: 0 | Status: SHIPPED | OUTPATIENT
Start: 2024-03-19 | End: 2024-03-19 | Stop reason: SDUPTHER

## 2024-05-06 DIAGNOSIS — E66.9 OBESITY (BMI 30.0-34.9): ICD-10-CM

## 2024-05-06 RX ORDER — PHENTERMINE HYDROCHLORIDE 37.5 MG/1
TABLET ORAL
Qty: 30 TABLET | Refills: 0 | Status: SHIPPED | OUTPATIENT
Start: 2024-05-06 | End: 2024-06-05

## 2024-06-19 NOTE — PATIENT INSTRUCTIONS
Enrike Crespo is a 89 y.o. male With past history of sick sinus syndrome status post pacemaker, hypertension, seizure, diabetes. Now s/p  Exploration laparotomy, lysis of adhesions, small bowel resection with anastamosis, closure on 6/18 for SBO. Acute pain team consulted for postop pain control.    Postop Pain HPI -   Palliative: relieved with IV analgesics and regional local anesthetics  Provocative: movement  Quality:  burning and aching  Radiation:  none  Severity:  9/10  Timing: constant    Past Medical History:   Diagnosis Date    Bleeding hemorrhoid     Cardiac arrest (Multi)     Diabetes (Multi)     Hearing aid worn     Heart failure (Multi)     HL (hearing loss)         Past Surgical History:   Procedure Laterality Date    CARDIAC DEFIBRILLATOR PLACEMENT          Family History   Problem Relation Name Age of Onset    Heart disease Mother          Social History     Socioeconomic History    Marital status: Single     Spouse name: Not on file    Number of children: Not on file    Years of education: Not on file    Highest education level: Not on file   Occupational History    Not on file   Tobacco Use    Smoking status: Every Day     Current packs/day: 1.00     Average packs/day: 1 pack/day for 75.0 years (75.0 ttl pk-yrs)     Types: Cigarettes    Smokeless tobacco: Never   Vaping Use    Vaping status: Never Used   Substance and Sexual Activity    Alcohol use: Not Currently    Drug use: Never    Sexual activity: Defer   Other Topics Concern    Not on file   Social History Narrative    Not on file     Social Determinants of Health     Financial Resource Strain: Low Risk  (6/14/2024)    Overall Financial Resource Strain (CARDIA)     Difficulty of Paying Living Expenses: Not hard at all   Food Insecurity: Not on file   Transportation Needs: Patient Unable To Answer (6/14/2024)    PRAPARE - Transportation     Lack of Transportation (Medical): Patient unable to answer     Lack of Transportation (Non-Medical):  Knee (Pes Anserine) Bursitis: Exercises  Introduction  Here are some examples of exercises for you to try. The exercises may be suggested for a condition or for rehabilitation. Start each exercise slowly. Ease off the exercises if you start to have pain. You will be told when to start these exercises and which ones will work best for you. How to do the exercises  Heel slide    1. Lie on your back with your affected knee straight. Your good knee should be bent. 2. Bend your affected knee by sliding your heel across the floor and toward your buttock until you feel a gentle stretch in your knee. 3. Hold for about 6 seconds, and then slowly straighten your knee. 4. Repeat 8 to 12 times. Quad sets    1. Sit with your affected leg straight and supported on the floor or a firm bed. Place a small, rolled-up towel under your affected knee. Your other leg should be bent, with that foot flat on the floor. 2. Tighten the thigh muscles of your affected leg by pressing the back of your knee down into the towel. 3. Hold for about 6 seconds, then rest for up to 10 seconds. 4. Repeat 8 to 12 times. Straight-leg raises to the front    1. Lie on your back with your good knee bent so that your foot rests flat on the floor. Your affected leg should be straight. Make sure that your low back has a normal curve. You should be able to slip your hand in between the floor and the small of your back, with your palm touching the floor and your back touching the back of your hand. 2. Tighten the thigh muscles in your affected leg by pressing the back of your knee flat down to the floor. Hold your knee straight. 3. Keeping the thigh muscles tight and your leg straight, lift your affected leg up so that your heel is about 12 inches off the floor. 4. Hold for about 6 seconds, then lower slowly. Rest for up to 10 seconds between repetitions. 5. Repeat 8 to 12 times. Follow-up care is a key part of your treatment and safety.  Be Patient unable to answer   Physical Activity: Not on file   Stress: Not on file   Social Connections: Not on file   Intimate Partner Violence: Not on file   Housing Stability: Unknown (6/14/2024)    Housing Stability Vital Sign     Unable to Pay for Housing in the Last Year: Patient unable to answer     Number of Places Lived in the Last Year: 1     Unstable Housing in the Last Year: No        Allergies   Allergen Reactions    Penicillins Hives        Review of Systems  Gen: No fatigue, anorexia, insomnia, fever.   Eyes: No vision loss, double vision, drainage, eye pain.   ENT: No pharyngitis, dry mouth, no hearing changes or ear discharge  Cardiac: No chest pain, palpitations, syncope, near syncope.   Pulmonary: No shortness of breath, cough, hemoptysis.   Heme/lymph: No swollen glands, fever, bleeding.   GI: No abdominal pain, change in bowel habits, melena, hematemesis, hematochezia, nausea, vomiting, diarrhea.   : No discharge, dysuria, frequency, urgency, hematuria.  Endo: No polyuria or weight loss.   Musculoskeletal: Negative for any pain or loss of ROM/weakness  Skin: No rashes or lesions  Neuro: Normal speech, no numbness or weakness. No gait difficulties  Review of systems is otherwise negative unless stated above or in history of present illness.    Physical Exam:  Constitutional:  no distress, alert and cooperative  Eyes: clear sclera  Head/Neck: No apparent injury, trachea midline  Respiratory/Thorax: Patent airways, thorax symmetric, breathing comfortably  Cardiovascular: no pitting edema  Gastrointestinal: Nondistended  Musculoskeletal: ROM intact  Extremities: no clubbing  Neurological: alert, romero x4  Psychological: Appropriate affect    Results for orders placed or performed during the hospital encounter of 06/13/24 (from the past 24 hour(s))   Cardiac Device Check - Inpatient   Result Value Ref Range    BSA 1.91 m2   POCT GLUCOSE   Result Value Ref Range    POCT Glucose 178 (H) 74 - 99 mg/dL  sure to make and go to all appointments, and call your doctor if you are having problems. It's also a good idea to know your test results and keep a list of the medicines you take. Where can you learn more? Go to http://www.gray.com/  Enter L913 in the search box to learn more about \"Knee (Pes Anserine) Bursitis: Exercises. \"  Current as of: July 1, 2021               Content Version: 13.2  © 2006-2022 [employer]. Care instructions adapted under license by [employer] (which disclaims liability or warranty for this information). If you have questions about a medical condition or this instruction, always ask your healthcare professional. [place] [address] any warranty or liability for your use of this information. Knee Arthritis: Exercises  Introduction  Here are some examples of exercises for you to try. The exercises may be suggested for a condition or for rehabilitation. Start each exercise slowly. Ease off the exercises if you start to have pain. You will be told when to start these exercises and which ones will work best for you. How to do the exercises  Knee flexion with heel slide    1. Lie on your back with your knees bent. 2. Slide your heel back by bending your affected knee as far as you can. Then hook your other foot around your ankle to help pull your heel even farther back. 3. Hold for about 6 seconds, then rest for up to 10 seconds. 4. Repeat 8 to 12 times. 5. Switch legs and repeat steps 1 through 4, even if only one knee is sore. Quad sets    1. Sit with your affected leg straight and supported on the floor or a firm bed. Place a small, rolled-up towel under your knee. Your other leg should be bent, with that foot flat on the floor. 2. Tighten the thigh muscles of your affected leg by pressing the back of your knee down into the towel. 3. Hold for about 6 seconds, then rest for up to 10 seconds.   4. Repeat 8 to 12   POCT GLUCOSE   Result Value Ref Range    POCT Glucose 178 (H) 74 - 99 mg/dL   POCT GLUCOSE   Result Value Ref Range    POCT Glucose 151 (H) 74 - 99 mg/dL   CBC   Result Value Ref Range    WBC 8.2 4.4 - 11.3 x10*3/uL    nRBC 0.0 0.0 - 0.0 /100 WBCs    RBC 4.01 (L) 4.50 - 5.90 x10*6/uL    Hemoglobin 12.2 (L) 13.5 - 17.5 g/dL    Hematocrit 40.3 (L) 41.0 - 52.0 %     (H) 80 - 100 fL    MCH 30.4 26.0 - 34.0 pg    MCHC 30.3 (L) 32.0 - 36.0 g/dL    RDW 12.7 11.5 - 14.5 %    Platelets 168 150 - 450 x10*3/uL   Magnesium   Result Value Ref Range    Magnesium 1.70 1.60 - 2.40 mg/dL   Renal function panel   Result Value Ref Range    Glucose 135 (H) 74 - 99 mg/dL    Sodium 148 (H) 136 - 145 mmol/L    Potassium 4.1 3.5 - 5.3 mmol/L    Chloride 106 98 - 107 mmol/L    Bicarbonate 33 (H) 21 - 32 mmol/L    Anion Gap 13 10 - 20 mmol/L    Urea Nitrogen 24 (H) 6 - 23 mg/dL    Creatinine 1.09 0.50 - 1.30 mg/dL    eGFR 63 >60 mL/min/1.73m*2    Calcium 8.1 (L) 8.6 - 10.6 mg/dL    Phosphorus 2.1 (L) 2.5 - 4.9 mg/dL    Albumin 2.9 (L) 3.4 - 5.0 g/dL   POCT GLUCOSE   Result Value Ref Range    POCT Glucose 129 (H) 74 - 99 mg/dL      Enrike Crespo is a 89 y.o. male With past history of sick sinus syndrome status post pacemaker, hypertension, seizure, diabetes. Now s/p  exploration laparotomy, lysis of adhesions, small bowel resection with anastamosis, closure on 6/18 for SBO. Acute pain team consulted for postop pain control.    Plan   - Last INR 1.6 from 6/14. Please repeat coags. Will plan on block tomorrow if coags normalize.   - Ok to receive lovenox this evening, please hold tomorrow morning  - Recommend:    - 1g Mg over 1 hour   - Scheduled IV tylenol - 650 mg q6hr   - Robaxin 1000mg TID    - Lidocaine patches   - Acute pain will continue to follow  - Rest of care per primary team     Acute pain resident  Ph [phone], Pg [phone]    times.  5. Switch legs and repeat steps 1 through 4, even if only one knee is sore. Straight-leg raises to the front    1. Lie on your back with your good knee bent so that your foot rests flat on the floor. Your affected leg should be straight. Make sure that your low back has a normal curve. You should be able to slip your hand in between the floor and the small of your back, with your palm touching the floor and your back touching the back of your hand. 2. Tighten the thigh muscles in your affected leg by pressing the back of your knee flat down to the floor. Hold your knee straight. 3. Keeping the thigh muscles tight and your leg straight, lift your affected leg up so that your heel is about 12 inches off the floor. Hold for about 6 seconds, then lower slowly. 4. Relax for up to 10 seconds between repetitions. 5. Repeat 8 to 12 times. 6. Switch legs and repeat steps 1 through 5, even if only one knee is sore. Active knee flexion    1. Lie on your stomach with your knees straight. If your kneecap is uncomfortable, roll up a washcloth and put it under your leg just above your kneecap. 2. Lift the foot of your affected leg by bending the knee so that you bring the foot up toward your buttock. If this motion hurts, try it without bending your knee quite as far. This may help you avoid any painful motion. 3. Slowly move your leg up and down. 4. Repeat 8 to 12 times. 5. Switch legs and repeat steps 1 through 4, even if only one knee is sore. Quadriceps stretch (facedown)    1. Lie flat on your stomach, and rest your face on the floor. 2. Wrap a towel or belt strap around the lower part of your affected leg. Then use the towel or belt strap to slowly pull your heel toward your buttock until you feel a stretch. 3. Hold for about 15 to 30 seconds, then relax your leg against the towel or belt strap. 4. Repeat 2 to 4 times.   5. Switch legs and repeat steps 1 through 4, even if only one knee is sore.  Stationary exercise bike    1. If you do not have a stationary exercise bike at home, you can find one to ride at your local health club or community center. 2. Adjust the height of the bike seat so that your knee is slightly bent when your leg is extended downward. If your knee hurts when the pedal reaches the top, you can raise the seat so that your knee does not bend as much. 3. Start slowly. At first, try to do 5 to 10 minutes of cycling with little to no resistance. Then increase your time and the resistance bit by bit until you can do 20 to 30 minutes without pain. 4. If you start to have pain, rest your knee until your pain gets back to the level that is normal for you. Or cycle for less time or with less effort. Follow-up care is a key part of your treatment and safety. Be sure to make and go to all appointments, and call your doctor if you are having problems. It's also a good idea to know your test results and keep a list of the medicines you take. Where can you learn more? Go to http://www.[employer].com/  Enter C159 in the search box to learn more about \"Knee Arthritis: Exercises. \"  Current as of: July 1, 2021               Content Version: 13.2  © 2006-2022 [employer]. Care instructions adapted under license by [employer] (which disclaims liability or warranty for this information). If you have questions about a medical condition or this instruction, always ask your healthcare professional. [place] [address] any warranty or liability for your use of this information.

## 2024-06-21 ENCOUNTER — TELEPHONE (OUTPATIENT)
Age: 64
End: 2024-06-21

## 2024-06-30 DIAGNOSIS — E66.9 OBESITY (BMI 30.0-34.9): ICD-10-CM

## 2024-06-30 RX ORDER — PHENTERMINE HYDROCHLORIDE 37.5 MG/1
TABLET ORAL
Qty: 30 TABLET | OUTPATIENT
Start: 2024-06-30 | End: 2024-07-30

## 2024-07-05 DIAGNOSIS — E66.9 OBESITY (BMI 30.0-34.9): ICD-10-CM

## 2024-07-07 RX ORDER — PHENTERMINE HYDROCHLORIDE 37.5 MG/1
TABLET ORAL
Qty: 30 TABLET | OUTPATIENT
Start: 2024-07-07 | End: 2024-08-06

## 2024-08-25 DIAGNOSIS — E66.9 OBESITY (BMI 30.0-34.9): ICD-10-CM

## 2024-08-25 RX ORDER — PHENTERMINE HYDROCHLORIDE 37.5 MG/1
37.5 TABLET ORAL
Qty: 30 TABLET | Refills: 0 | Status: SHIPPED | OUTPATIENT
Start: 2024-08-25 | End: 2024-09-24

## 2024-09-26 DIAGNOSIS — E66.9 OBESITY (BMI 30.0-34.9): ICD-10-CM

## 2024-09-27 RX ORDER — PHENTERMINE HYDROCHLORIDE 37.5 MG/1
TABLET ORAL
Qty: 30 TABLET | OUTPATIENT
Start: 2024-09-27

## 2024-12-26 ENCOUNTER — OFFICE VISIT (OUTPATIENT)
Age: 64
End: 2024-12-26
Payer: COMMERCIAL

## 2024-12-26 VITALS
DIASTOLIC BLOOD PRESSURE: 85 MMHG | HEIGHT: 63 IN | TEMPERATURE: 97.3 F | RESPIRATION RATE: 16 BRPM | OXYGEN SATURATION: 98 % | BODY MASS INDEX: 36.32 KG/M2 | WEIGHT: 205 LBS | HEART RATE: 77 BPM | SYSTOLIC BLOOD PRESSURE: 126 MMHG

## 2024-12-26 DIAGNOSIS — M19.019 PRIMARY OSTEOARTHRITIS OF SHOULDER, UNSPECIFIED LATERALITY: ICD-10-CM

## 2024-12-26 DIAGNOSIS — Z00.00 ENCOUNTER FOR WELL ADULT EXAM WITHOUT ABNORMAL FINDINGS: Primary | ICD-10-CM

## 2024-12-26 DIAGNOSIS — Z00.00 ENCOUNTER FOR WELL ADULT EXAM WITHOUT ABNORMAL FINDINGS: ICD-10-CM

## 2024-12-26 DIAGNOSIS — E66.811 OBESITY (BMI 30.0-34.9): ICD-10-CM

## 2024-12-26 LAB
ALBUMIN SERPL-MCNC: 3.8 G/DL (ref 3.5–5)
ALBUMIN/GLOB SERPL: 1.2 (ref 1.1–2.2)
ALP SERPL-CCNC: 81 U/L (ref 45–117)
ALT SERPL-CCNC: 17 U/L (ref 12–78)
ANION GAP SERPL CALC-SCNC: 4 MMOL/L (ref 2–12)
AST SERPL-CCNC: 19 U/L (ref 15–37)
BASOPHILS # BLD: 0 K/UL (ref 0–0.1)
BASOPHILS NFR BLD: 0 % (ref 0–1)
BILIRUB SERPL-MCNC: 0.9 MG/DL (ref 0.2–1)
BUN SERPL-MCNC: 19 MG/DL (ref 6–20)
BUN/CREAT SERPL: 24 (ref 12–20)
CALCIUM SERPL-MCNC: 9.4 MG/DL (ref 8.5–10.1)
CHLORIDE SERPL-SCNC: 107 MMOL/L (ref 97–108)
CHOLEST SERPL-MCNC: 226 MG/DL
CO2 SERPL-SCNC: 30 MMOL/L (ref 21–32)
CREAT SERPL-MCNC: 0.78 MG/DL (ref 0.55–1.02)
DIFFERENTIAL METHOD BLD: ABNORMAL
EOSINOPHIL # BLD: 0.1 K/UL (ref 0–0.4)
EOSINOPHIL NFR BLD: 1 % (ref 0–7)
ERYTHROCYTE [DISTWIDTH] IN BLOOD BY AUTOMATED COUNT: 15.2 % (ref 11.5–14.5)
GLOBULIN SER CALC-MCNC: 3.3 G/DL (ref 2–4)
GLUCOSE SERPL-MCNC: 84 MG/DL (ref 65–100)
HCT VFR BLD AUTO: 39.3 % (ref 35–47)
HDLC SERPL-MCNC: 98 MG/DL
HDLC SERPL: 2.3 (ref 0–5)
HGB BLD-MCNC: 12 G/DL (ref 11.5–16)
IMM GRANULOCYTES # BLD AUTO: 0 K/UL (ref 0–0.04)
IMM GRANULOCYTES NFR BLD AUTO: 0 % (ref 0–0.5)
LDLC SERPL CALC-MCNC: 111.6 MG/DL (ref 0–100)
LYMPHOCYTES # BLD: 1.6 K/UL (ref 0.8–3.5)
LYMPHOCYTES NFR BLD: 32 % (ref 12–49)
MCH RBC QN AUTO: 22.7 PG (ref 26–34)
MCHC RBC AUTO-ENTMCNC: 30.5 G/DL (ref 30–36.5)
MCV RBC AUTO: 74.4 FL (ref 80–99)
MONOCYTES # BLD: 0.4 K/UL (ref 0–1)
MONOCYTES NFR BLD: 7 % (ref 5–13)
NEUTS SEG # BLD: 2.8 K/UL (ref 1.8–8)
NEUTS SEG NFR BLD: 58 % (ref 32–75)
NRBC # BLD: 0 K/UL (ref 0–0.01)
NRBC BLD-RTO: 0 PER 100 WBC
PLATELET # BLD AUTO: 207 K/UL (ref 150–400)
PMV BLD AUTO: 12.5 FL (ref 8.9–12.9)
POTASSIUM SERPL-SCNC: 3.6 MMOL/L (ref 3.5–5.1)
PROT SERPL-MCNC: 7.1 G/DL (ref 6.4–8.2)
RBC # BLD AUTO: 5.28 M/UL (ref 3.8–5.2)
SODIUM SERPL-SCNC: 141 MMOL/L (ref 136–145)
TRIGL SERPL-MCNC: 82 MG/DL
TSH SERPL DL<=0.05 MIU/L-ACNC: 1.71 UIU/ML (ref 0.36–3.74)
VLDLC SERPL CALC-MCNC: 16.4 MG/DL
WBC # BLD AUTO: 4.9 K/UL (ref 3.6–11)

## 2024-12-26 PROCEDURE — 99396 PREV VISIT EST AGE 40-64: CPT | Performed by: INTERNAL MEDICINE

## 2024-12-26 RX ORDER — MULTIVIT-MIN/IRON FUM/FOLIC AC 7.5 MG-4
1 TABLET ORAL DAILY
COMMUNITY
Start: 2024-12-26

## 2024-12-26 SDOH — ECONOMIC STABILITY: FOOD INSECURITY: WITHIN THE PAST 12 MONTHS, YOU WORRIED THAT YOUR FOOD WOULD RUN OUT BEFORE YOU GOT MONEY TO BUY MORE.: NEVER TRUE

## 2024-12-26 SDOH — ECONOMIC STABILITY: FOOD INSECURITY: WITHIN THE PAST 12 MONTHS, THE FOOD YOU BOUGHT JUST DIDN'T LAST AND YOU DIDN'T HAVE MONEY TO GET MORE.: NEVER TRUE

## 2024-12-26 SDOH — ECONOMIC STABILITY: INCOME INSECURITY: HOW HARD IS IT FOR YOU TO PAY FOR THE VERY BASICS LIKE FOOD, HOUSING, MEDICAL CARE, AND HEATING?: NOT HARD AT ALL

## 2024-12-26 ASSESSMENT — PATIENT HEALTH QUESTIONNAIRE - PHQ9
SUM OF ALL RESPONSES TO PHQ QUESTIONS 1-9: 0
6. FEELING BAD ABOUT YOURSELF - OR THAT YOU ARE A FAILURE OR HAVE LET YOURSELF OR YOUR FAMILY DOWN: NOT AT ALL
2. FEELING DOWN, DEPRESSED OR HOPELESS: NOT AT ALL
9. THOUGHTS THAT YOU WOULD BE BETTER OFF DEAD, OR OF HURTING YOURSELF: NOT AT ALL
3. TROUBLE FALLING OR STAYING ASLEEP: NOT AT ALL
7. TROUBLE CONCENTRATING ON THINGS, SUCH AS READING THE NEWSPAPER OR WATCHING TELEVISION: NOT AT ALL
1. LITTLE INTEREST OR PLEASURE IN DOING THINGS: NOT AT ALL
SUM OF ALL RESPONSES TO PHQ9 QUESTIONS 1 & 2: 0
SUM OF ALL RESPONSES TO PHQ QUESTIONS 1-9: 0
SUM OF ALL RESPONSES TO PHQ QUESTIONS 1-9: 0
10. IF YOU CHECKED OFF ANY PROBLEMS, HOW DIFFICULT HAVE THESE PROBLEMS MADE IT FOR YOU TO DO YOUR WORK, TAKE CARE OF THINGS AT HOME, OR GET ALONG WITH OTHER PEOPLE: NOT DIFFICULT AT ALL
SUM OF ALL RESPONSES TO PHQ QUESTIONS 1-9: 0
4. FEELING TIRED OR HAVING LITTLE ENERGY: NOT AT ALL
8. MOVING OR SPEAKING SO SLOWLY THAT OTHER PEOPLE COULD HAVE NOTICED. OR THE OPPOSITE, BEING SO FIGETY OR RESTLESS THAT YOU HAVE BEEN MOVING AROUND A LOT MORE THAN USUAL: NOT AT ALL
5. POOR APPETITE OR OVEREATING: NOT AT ALL

## 2024-12-26 NOTE — PROGRESS NOTES
Well Adult Note  Name: Delores Peng Today’s Date: 2024   MRN: [id number] Sex: Female   Age: 64 y.o. Ethnicity: Non- / Non    : 1960 Race: Black /       Delores Peng is here for a well adult exam.       Assessment & Plan   Encounter for well adult exam without abnormal findings-advised to make a mammogram and bone density appointment as well as colonoscopy.  -     [facility] - [facility]ph[facility] Dupree [facility]  -     JOHN DIGITAL SCREEN W OR WO CAD BILATERAL; Future  -     DEXA BONE DENSITY AXIAL SKELETON; Future  -     Comprehensive Metabolic Panel; Future  -     CBC with Auto Differential; Future  -     Lipid Panel; Future  -     TSH; Future  Obesity (BMI 30.0-34.9)-previously took phentermine.  Will now try compounded Wegovy.  She will let me know how she is doing in a month.  Warned about nausea and bowel movement issues.  Primary osteoarthritis of shoulder, unspecified laterality-stretching exercises.  Would consider more injections and physical therapy if symptoms persist.      Return in 1 year (on 2025) for CPE (Physical Exam).       Subjective   History:  Presents for a wellness exam and a follow-up.  Has ongoing issues with her weight.  Some ongoing issues with her shoulders as well.  Denies fevers or chills.  No sweats.  No nausea or vomiting.  Denies any change in bowel or bladder habits.    Review of Systems  Per HPI.  Allergies   Allergen Reactions    Penicillin G Anaphylaxis    Meperidine Itching     Prior to Visit Medications    Not on File     Past Medical History:   Diagnosis Date    Arthritis     Depression, major, recurrent, moderate (HCC) 8/15/2019    Other specified dorsopathies, sacral and sacrococcygeal region (CODE)      Past Surgical History:   Procedure Laterality Date    COLONOSCOPY  1/15/15    Dr. Corea - 10 yr f/u    HYSTERECTOMY (CERVIX STATUS UNKNOWN)      MOHS SURGERY      right shoulder    ORTHOPEDIC SURGERY

## 2024-12-30 ENCOUNTER — HOSPITAL ENCOUNTER (EMERGENCY)
Facility: HOSPITAL | Age: 64
Discharge: HOME | End: 2024-12-30
Attending: EMERGENCY MEDICINE
Payer: COMMERCIAL

## 2024-12-30 ENCOUNTER — APPOINTMENT (OUTPATIENT)
Facility: HOSPITAL | Age: 64
End: 2024-12-30
Payer: COMMERCIAL

## 2024-12-30 VITALS
RESPIRATION RATE: 16 BRPM | BODY MASS INDEX: 36.48 KG/M2 | DIASTOLIC BLOOD PRESSURE: 84 MMHG | SYSTOLIC BLOOD PRESSURE: 124 MMHG | OXYGEN SATURATION: 95 % | HEART RATE: 115 BPM | HEIGHT: 63 IN | WEIGHT: 205.91 LBS | TEMPERATURE: 97.8 F

## 2024-12-30 DIAGNOSIS — R10.2 PELVIC PAIN: Primary | ICD-10-CM

## 2024-12-30 LAB
ALBUMIN SERPL-MCNC: 3.5 G/DL (ref 3.5–5)
ALBUMIN/GLOB SERPL: 0.9 (ref 1.1–2.2)
ALP SERPL-CCNC: 73 U/L (ref 45–117)
ALT SERPL-CCNC: 19 U/L (ref 12–78)
ANION GAP SERPL CALC-SCNC: 7 MMOL/L (ref 2–12)
APPEARANCE UR: CLEAR
AST SERPL-CCNC: 20 U/L (ref 15–37)
BACTERIA URNS QL MICRO: ABNORMAL /HPF
BASOPHILS # BLD: 0 K/UL (ref 0–0.1)
BASOPHILS NFR BLD: 0 % (ref 0–1)
BILIRUB SERPL-MCNC: 1 MG/DL (ref 0.2–1)
BILIRUB UR QL: NEGATIVE
BUN SERPL-MCNC: 13 MG/DL (ref 6–20)
BUN/CREAT SERPL: 14 (ref 12–20)
CALCIUM SERPL-MCNC: 9.5 MG/DL (ref 8.5–10.1)
CHLORIDE SERPL-SCNC: 105 MMOL/L (ref 97–108)
CO2 SERPL-SCNC: 26 MMOL/L (ref 21–32)
COLOR UR: ABNORMAL
COMMENT:: NORMAL
CREAT SERPL-MCNC: 0.91 MG/DL (ref 0.55–1.02)
DIFFERENTIAL METHOD BLD: ABNORMAL
EOSINOPHIL # BLD: 0.1 K/UL (ref 0–0.4)
EOSINOPHIL NFR BLD: 1 % (ref 0–7)
EPITH CASTS URNS QL MICRO: ABNORMAL /LPF
ERYTHROCYTE [DISTWIDTH] IN BLOOD BY AUTOMATED COUNT: 14.9 % (ref 11.5–14.5)
GLOBULIN SER CALC-MCNC: 4 G/DL (ref 2–4)
GLUCOSE SERPL-MCNC: 103 MG/DL (ref 65–100)
GLUCOSE UR STRIP.AUTO-MCNC: NEGATIVE MG/DL
HCT VFR BLD AUTO: 40.5 % (ref 35–47)
HGB BLD-MCNC: 12.8 G/DL (ref 11.5–16)
HGB UR QL STRIP: NEGATIVE
HYALINE CASTS URNS QL MICRO: ABNORMAL /LPF (ref 0–5)
IMM GRANULOCYTES # BLD AUTO: 0.1 K/UL (ref 0–0.04)
IMM GRANULOCYTES NFR BLD AUTO: 1 % (ref 0–0.5)
KETONES UR QL STRIP.AUTO: 15 MG/DL
LEUKOCYTE ESTERASE UR QL STRIP.AUTO: NEGATIVE
LIPASE SERPL-CCNC: 40 U/L (ref 13–75)
LYMPHOCYTES # BLD: 0.7 K/UL (ref 0.8–3.5)
LYMPHOCYTES NFR BLD: 7 % (ref 12–49)
MCH RBC QN AUTO: 23.1 PG (ref 26–34)
MCHC RBC AUTO-ENTMCNC: 31.6 G/DL (ref 30–36.5)
MCV RBC AUTO: 73 FL (ref 80–99)
MONOCYTES # BLD: 0.7 K/UL (ref 0–1)
MONOCYTES NFR BLD: 7 % (ref 5–13)
NEUTS SEG # BLD: 8.2 K/UL (ref 1.8–8)
NEUTS SEG NFR BLD: 84 % (ref 32–75)
NITRITE UR QL STRIP.AUTO: NEGATIVE
NRBC # BLD: 0 K/UL (ref 0–0.01)
NRBC BLD-RTO: 0 PER 100 WBC
PH UR STRIP: 5.5 (ref 5–8)
PLATELET # BLD AUTO: 228 K/UL (ref 150–400)
PMV BLD AUTO: 11.4 FL (ref 8.9–12.9)
POTASSIUM SERPL-SCNC: 3.7 MMOL/L (ref 3.5–5.1)
PROT SERPL-MCNC: 7.5 G/DL (ref 6.4–8.2)
PROT UR STRIP-MCNC: 30 MG/DL
RBC # BLD AUTO: 5.55 M/UL (ref 3.8–5.2)
RBC #/AREA URNS HPF: ABNORMAL /HPF (ref 0–5)
RBC MORPH BLD: ABNORMAL
SODIUM SERPL-SCNC: 138 MMOL/L (ref 136–145)
SP GR UR REFRACTOMETRY: 1.03 (ref 1–1.03)
SPECIMEN HOLD: NORMAL
SPECIMEN HOLD: NORMAL
UROBILINOGEN UR QL STRIP.AUTO: 0.2 EU/DL (ref 0.2–1)
WBC # BLD AUTO: 9.8 K/UL (ref 3.6–11)
WBC URNS QL MICRO: ABNORMAL /HPF (ref 0–4)

## 2024-12-30 PROCEDURE — 6360000004 HC RX CONTRAST MEDICATION: Performed by: RADIOLOGY

## 2024-12-30 PROCEDURE — 99285 EMERGENCY DEPT VISIT HI MDM: CPT

## 2024-12-30 PROCEDURE — 96374 THER/PROPH/DIAG INJ IV PUSH: CPT

## 2024-12-30 PROCEDURE — 85025 COMPLETE CBC W/AUTO DIFF WBC: CPT

## 2024-12-30 PROCEDURE — 83690 ASSAY OF LIPASE: CPT

## 2024-12-30 PROCEDURE — 36415 COLL VENOUS BLD VENIPUNCTURE: CPT

## 2024-12-30 PROCEDURE — 6360000002 HC RX W HCPCS: Performed by: EMERGENCY MEDICINE

## 2024-12-30 PROCEDURE — 74177 CT ABD & PELVIS W/CONTRAST: CPT

## 2024-12-30 PROCEDURE — 80053 COMPREHEN METABOLIC PANEL: CPT

## 2024-12-30 PROCEDURE — 81001 URINALYSIS AUTO W/SCOPE: CPT

## 2024-12-30 RX ORDER — NAPROXEN 500 MG/1
500 TABLET ORAL 2 TIMES DAILY
Qty: 20 TABLET | Refills: 0 | Status: SHIPPED | OUTPATIENT
Start: 2024-12-30

## 2024-12-30 RX ORDER — IOPAMIDOL 755 MG/ML
100 INJECTION, SOLUTION INTRAVASCULAR
Status: COMPLETED | OUTPATIENT
Start: 2024-12-30 | End: 2024-12-30

## 2024-12-30 RX ORDER — KETOROLAC TROMETHAMINE 30 MG/ML
15 INJECTION, SOLUTION INTRAMUSCULAR; INTRAVENOUS ONCE
Status: COMPLETED | OUTPATIENT
Start: 2024-12-30 | End: 2024-12-30

## 2024-12-30 RX ADMIN — KETOROLAC TROMETHAMINE 15 MG: 30 INJECTION, SOLUTION INTRAMUSCULAR at 11:35

## 2024-12-30 RX ADMIN — IOPAMIDOL 100 ML: 755 INJECTION, SOLUTION INTRAVENOUS at 09:57

## 2024-12-30 ASSESSMENT — PAIN - FUNCTIONAL ASSESSMENT
PAIN_FUNCTIONAL_ASSESSMENT: 0-10
PAIN_FUNCTIONAL_ASSESSMENT: ACTIVITIES ARE NOT PREVENTED

## 2024-12-30 ASSESSMENT — PAIN DESCRIPTION - DIRECTION: RADIATING_TOWARDS: BACK

## 2024-12-30 ASSESSMENT — PAIN DESCRIPTION - DESCRIPTORS
DESCRIPTORS: DULL
DESCRIPTORS: DISCOMFORT

## 2024-12-30 ASSESSMENT — PAIN SCALES - GENERAL
PAINLEVEL_OUTOF10: 7
PAINLEVEL_OUTOF10: 5

## 2024-12-30 ASSESSMENT — PAIN DESCRIPTION - ORIENTATION
ORIENTATION: LOWER;LEFT;RIGHT
ORIENTATION: LOWER

## 2024-12-30 ASSESSMENT — PAIN DESCRIPTION - LOCATION
LOCATION: PELVIS
LOCATION: BACK

## 2024-12-30 ASSESSMENT — PAIN DESCRIPTION - PAIN TYPE: TYPE: ACUTE PAIN

## 2024-12-30 ASSESSMENT — PAIN DESCRIPTION - FREQUENCY: FREQUENCY: INTERMITTENT

## 2024-12-30 ASSESSMENT — PAIN DESCRIPTION - ONSET: ONSET: ON-GOING

## 2024-12-30 NOTE — ED TRIAGE NOTES
Pt arrives from home cc pelvic pain, vaginal, abdominal and lower back pain x6 days after having sexual intercourse. She has taken motrin to help alleviate the pain. Denies blood in urine or vaginal bleeding.     Hx; hysterectomy in 2004

## 2024-12-30 NOTE — ED PROVIDER NOTES
symptoms or fever or leukocytosis.  Suspect this is likely atelectasis.  Recommend NSAIDs for now.  Follow-up with primary care, return if worsens.    Amount and/or Complexity of Data Reviewed  Labs: ordered.  Radiology: ordered and independent interpretation performed. Decision-making details documented in ED Course.    Risk  Prescription drug management.            REASSESSMENT          CONSULTS:  None    PROCEDURES:     Procedures            (Please note that portions of this note were completed with a voice recognition program.  Efforts were made to edit the dictations but occasionally words are mis-transcribed.)    Galen Mon MD (electronically signed)  Emergency Attending Physician              Galen Mon MD  12/30/24 [id number]

## 2025-05-09 ENCOUNTER — TELEMEDICINE (OUTPATIENT)
Age: 65
End: 2025-05-09
Payer: COMMERCIAL

## 2025-05-09 DIAGNOSIS — B37.0 THRUSH: ICD-10-CM

## 2025-05-09 DIAGNOSIS — J06.9 VIRAL URI: Primary | ICD-10-CM

## 2025-05-09 PROCEDURE — 3017F COLORECTAL CA SCREEN DOC REV: CPT | Performed by: NURSE PRACTITIONER

## 2025-05-09 PROCEDURE — G8427 DOCREV CUR MEDS BY ELIG CLIN: HCPCS | Performed by: NURSE PRACTITIONER

## 2025-05-09 PROCEDURE — 99213 OFFICE O/P EST LOW 20 MIN: CPT | Performed by: NURSE PRACTITIONER

## 2025-05-09 RX ORDER — NYSTATIN 100000 [USP'U]/ML
500000 SUSPENSION ORAL 4 TIMES DAILY
Qty: 200 ML | Refills: 0 | Status: SHIPPED | OUTPATIENT
Start: 2025-05-09 | End: 2025-05-19

## 2025-05-09 RX ORDER — AZITHROMYCIN 250 MG/1
TABLET, FILM COATED ORAL
Qty: 6 TABLET | Refills: 0 | Status: SHIPPED | OUTPATIENT
Start: 2025-05-09 | End: 2025-05-19

## 2025-05-09 NOTE — PROGRESS NOTES
Delores Peng is a 64 y.o. female who was seen by synchronous (real-time) audio-video technology on 5/9/2025.      Assessment & Plan:   Below is the assessment and plan developed based on review of pertinent history, physical exam (if applicable), labs, studies, and medications.    Encounter Diagnoses   Name Primary?    Viral URI Yes    Thrush      Nystatin as prescribed  May continue OTC cold remedies  May start antibiotic if worsening symptoms over the next week    I spent at least 23 minutes on this visit with this established patient. ([id number])  Subjective:   Delores Peng was seen for Cough    Patient reports cough, scratchy throat, and tightness in chest worsening over the past week. She notes a coating on her tongue the past few days. She is taking zicam, zyrtec, jus seltzer. Cough is producing sputum. She has not done a Covid test. She notes clear sinus congestion.    Prior to Admission medications    Medication Sig Start Date End Date Taking? Authorizing Provider   nystatin (MYCOSTATIN) 461169 UNIT/ML suspension Take 5 mLs by mouth 4 times daily for 10 days Retain in mouth as long as possible 5/9/25 5/19/25 Yes Mg Ramos, APRN - NP   azithromycin (ZITHROMAX) 250 MG tablet 500mg on day 1 followed by 250mg on days 2 - 5 5/9/25 5/19/25 Yes Mg Ramos, ADEOLA - NP   Semaglutide-Weight Management (WEGOVY) 0.5 MG/0.5ML SOAJ SC injection Inject 0.5 mg into the skin every 7 days 2/25/25   Edgar Magallon MD   naproxen (NAPROSYN) 500 MG tablet Take 1 tablet by mouth 2 times daily 12/30/24   Galen Mon MD   Multiple Vitamins-Minerals (MULTIVITAMIN WITH MINERALS) tablet Take 1 tablet by mouth daily 12/26/24   Edgar Magallon MD       Patient Active Problem List   Diagnosis    Advance directive declined by patient    Depression, major, recurrent, moderate (HCC)    Pneumonia     Patient Active Problem List    Diagnosis Date Noted    Pneumonia 02/09/2023    Depression, major, recurrent, moderate